# Patient Record
Sex: FEMALE | Race: WHITE | NOT HISPANIC OR LATINO | Employment: OTHER | URBAN - METROPOLITAN AREA
[De-identification: names, ages, dates, MRNs, and addresses within clinical notes are randomized per-mention and may not be internally consistent; named-entity substitution may affect disease eponyms.]

---

## 2024-09-10 NOTE — H&P (VIEW-ONLY)
[facility]      Preoperative Consultation   Michelle Isaac   : 1955   Gender: female    Date of Encounter: 9/10/2024    Nursing Notes:   Leticia Iraheta MA  9/10/2024 10:57 AM  Signed  Chief Complaint   Patient presents with     Preoperative Exam     2024 - [facility]      Michelle Isaac is a 69 y.o. female (1955) who presents for preop evaluation undergoing OPEN LEFT HIP GLUTEUS MEDIUS TENDON REPAIR WITH ILIOTIBIAL BAND LENGTHENING     Date of Surgery: 2024  Surgical Specialty: Orthopedic  Kaleb Issa MD - [facility]  Hospital/Surgical Facility: [facility]  Fax number: [phone]   Surgery type: outpatient  Primary Physician: [place] CMA ....................  9/10/2024   10:52 AM       History of Present Illness   Patient is 69 years old very pleasant lady with chronic bilateral hip pain/weakness and MRI has revealed left sided gluteus medius tendon tear and right-sided gluteus medius and minus partial tear.  Patient is scheduled for left hip gluteus medius tendon repair with iliotibial band lengthening.  Patient otherwise in her usual state of health and doing well.  No cold or flulike symptoms.  No fever chills or night sweats.  No headache dizziness or vertigo.  No cough or wheezing or palpitations or shortness of breath or chest pain at rest or exertion.  No heartburn or reflux.  No bowel or bladder symptoms.       Review of Systems   A comprehensive review of systems was negative except for items noted in HPI.    Patient Active Problem List   Diagnosis Code     Symptomatic menopausal or female climacteric states N95.1     Unspecified hypothyroidism E03.9     HEALTH MAINTENANCE Z00.00     Acquired hypothyroidism E03.9     Hyperglycemia R73.9     Preop general physical exam Z01.818     Current Outpatient Medications   Medication Sig     CALCIUM 500 MG TAB      celecoxib (CELEBREX) 100 mg capsule  Take 100-200 mg by mouth once daily with a meal.     cholecalciferol (VITAMIN D-3) 2,000 unit capsule Take 1 capsule by mouth once daily.     cyanocobalamin (VITAMIN B12) 500 mcg tablet Take 1,000 mcg by mouth once daily.     ginkgo biloba 40 mg tab Take 40 mg by mouth once daily.     glucosamine sulfate 500 mg tab Take 500 mg by mouth once daily.     levothyroxine (SYNTHROID) 75 mcg tablet Take 1 Tablet (75 mcg) by mouth once daily.     MULTIVITAMIN TAB take 1 tablet by oral route once daily with food     omega-3 fatty acids-vitamin E (FISH OIL) 1,000 mg cap Take  by mouth.     triamcinolone (ARISTOCORT; KENALOG) 0.1 % cream Apply  topically to affected area(s) 2 times daily PRN itch.     TURMERIC ORAL Take 2 capsules by mouth once daily.     vitamin e 200 unit capsule Take 200 units by mouth once daily.     No current facility-administered medications for this visit.     Medications have been reviewed by me and are current to the best of my knowledge and ability.     Allergies   Allergen Reactions     Cipro [Ciprofloxacin]      abdominal pain     Past Surgical History:   . Laterality Date     (IA) HCHG INTRACARDIAC ABLATION FOR TX OF SVT  01/01/2003     (IA) [place] CATARACT SURGERYTRK POST OP MGT      Dr. Fan     COLONOSCOPY SCREENING  2005, 2010    normal,hemorrhoids, rpt q5y.     CYST REMOVAL  17 years ago    Under Left breast     TVT  20 years ago     VEIN STRIPPING  10+ years ago    Legs     Social History     Tobacco Use     Smoking status: Never     Smokeless tobacco: Never   Vaping Use     Vaping status: Never Used   Substance Use Topics     Alcohol use: Yes     Comment: Occas.      Drug use: No     Family History   Problem Relation Age of Onset     Diabetes Father         Type 2     Heart Disease Father 65        bypass     Stroke Father      Other Brother         CAM'S VASCULITIS     Heart Disease Brother      Heart Disease Brother      Thyroid Disease Daughter         Hypothyroidism         PAST  DIFFICULTY WITH ANESTHESIA: None     Physical Exam   /70 (Cuff Site: Right Arm, Position: Sitting, Cuff Size: Adult Regular)   Pulse 78   Wt 76.2 kg (168 lb)   SpO2 98%   BMI 27.96 kg/m   Body mass index is 27.96 kg/m .    Gen: patient is awake and alert and looks comfortable.  HEENT: no conjunctiva pallor, no scleral icterus, pupils are equal round and reactive to light. Oral cavity clean, tongue moist.  Neck: supple, trachea central, no thyromegaly, no lymphadenopathy.  Chest: air entry is normal bilaterally, no wheezes and crackles.  Heart: S1 and S2 are audible and regular.   Abdomen: soft , no guarding or rigidity, no masses or hepatosplenomegaly and bowel sounds are present.  Extremities : pulses are present, no ankle/leg edema.  Skin: no rash.  CNS/Psych: patient is awake, alert and oriented x 3. Cooperative and polite. Speech and memory are intact. Mood and affect are normal. Gait is antalgic no focal deficit.       Assessment / Plan     Labs: pending  ECG: Normal sinus rhythm.  Rate 73.  No acute ST or T wave changes.  No old EKG available for comparison.    ICD-10-CM    1. Preop general physical exam  Z01.818 CBC AND DIFFERENTIAL     BASIC METABOLIC PANEL     PROTIME-INR     DE READING EKG - NO CHARGE, COMP ONLY     EKG 12 LEAD     DE ECG ROUTINE ECG W/LEAST 12 LDS W/I&R      2. Bilateral hip pain  M25.551 CBC AND DIFFERENTIAL    M25.552 BASIC METABOLIC PANEL     PROTIME-INR     DE READING EKG - NO CHARGE, COMP ONLY     EKG 12 LEAD     DE ECG ROUTINE ECG W/LEAST 12 LDS W/I&R          Patient is cleared for planned procedure.     Please stop taking  Aspirin, Advil/Ibuprofen, Aleeve/Naproxen, indomethacin or any other NSAIDS or Vázquez 2 Inhibitors( Diclofenac, Meloxicam, Celecoxib, etc.) 7 days prior to procedure/surgery.    Stop all the vitamin products and Herbal supplements/drugs 7 days and Fish oil capsule 14 prior to procedure/surgery.    Stop taking any muscle relaxants, Baclofen, Flexeril or  Zanaflex, 7 days prior to procedure/surgery.    Ok to takeTylenol ( acetaminophen) as needed for pain and fever.      You should take  your all other routine medications with sip of water on the morning of procedure/surgery.    Please call us if any questions or concerns.     Electronically Signed by:   Stephan Lane MD ....................  9/10/2024   11:07 AM    9/10/2024

## 2024-09-11 RX ORDER — LEVOTHYROXINE SODIUM 75 UG/1
75 TABLET ORAL DAILY
COMMUNITY
Start: 2024-01-06

## 2024-09-11 RX ORDER — VITAMIN E (DL,TOCOPHERYL ACET) 90 MG
200 CAPSULE ORAL DAILY
COMMUNITY

## 2024-09-11 RX ORDER — ATORVASTATIN CALCIUM 20 MG/1
1 TABLET, FILM COATED ORAL AT BEDTIME
COMMUNITY
Start: 2024-09-10

## 2024-09-13 ENCOUNTER — ANESTHESIA (OUTPATIENT)
Dept: SURGERY | Facility: CLINIC | Age: 69
End: 2024-09-13
Payer: MEDICARE

## 2024-09-13 ENCOUNTER — HOSPITAL ENCOUNTER (OUTPATIENT)
Facility: CLINIC | Age: 69
Discharge: HOME | End: 2024-09-13
Attending: ORTHOPAEDIC SURGERY | Admitting: ORTHOPAEDIC SURGERY
Payer: MEDICARE

## 2024-09-13 ENCOUNTER — ANESTHESIA EVENT (OUTPATIENT)
Dept: SURGERY | Facility: CLINIC | Age: 69
End: 2024-09-13
Payer: MEDICARE

## 2024-09-13 VITALS
DIASTOLIC BLOOD PRESSURE: 68 MMHG | HEIGHT: 65 IN | SYSTOLIC BLOOD PRESSURE: 129 MMHG | HEART RATE: 73 BPM | BODY MASS INDEX: 27.66 KG/M2 | RESPIRATION RATE: 16 BRPM | OXYGEN SATURATION: 95 % | TEMPERATURE: 98.9 F | WEIGHT: 166 LBS

## 2024-09-13 DIAGNOSIS — S76.012D TEAR OF LEFT GLUTEUS MEDIUS TENDON, SUBSEQUENT ENCOUNTER: Primary | ICD-10-CM

## 2024-09-13 PROCEDURE — 370N000017 HC ANESTHESIA TECHNICAL FEE, PER MIN: Performed by: ORTHOPAEDIC SURGERY

## 2024-09-13 PROCEDURE — 250N000009 HC RX 250: Performed by: NURSE ANESTHETIST, CERTIFIED REGISTERED

## 2024-09-13 PROCEDURE — C1713 ANCHOR/SCREW BN/BN,TIS/BN: HCPCS | Performed by: ORTHOPAEDIC SURGERY

## 2024-09-13 PROCEDURE — 250N000009 HC RX 250: Performed by: ORTHOPAEDIC SURGERY

## 2024-09-13 PROCEDURE — 250N000011 HC RX IP 250 OP 636

## 2024-09-13 PROCEDURE — 999N000141 HC STATISTIC PRE-PROCEDURE NURSING ASSESSMENT: Performed by: ORTHOPAEDIC SURGERY

## 2024-09-13 PROCEDURE — 250N000011 HC RX IP 250 OP 636: Performed by: ANESTHESIOLOGY

## 2024-09-13 PROCEDURE — 258N000003 HC RX IP 258 OP 636: Performed by: ANESTHESIOLOGY

## 2024-09-13 PROCEDURE — 258N000003 HC RX IP 258 OP 636: Performed by: NURSE ANESTHETIST, CERTIFIED REGISTERED

## 2024-09-13 PROCEDURE — 360N000076 HC SURGERY LEVEL 3, PER MIN: Performed by: ORTHOPAEDIC SURGERY

## 2024-09-13 PROCEDURE — 250N000011 HC RX IP 250 OP 636: Performed by: ORTHOPAEDIC SURGERY

## 2024-09-13 PROCEDURE — 250N000011 HC RX IP 250 OP 636: Performed by: NURSE ANESTHETIST, CERTIFIED REGISTERED

## 2024-09-13 PROCEDURE — 272N000001 HC OR GENERAL SUPPLY STERILE: Performed by: ORTHOPAEDIC SURGERY

## 2024-09-13 PROCEDURE — 710N000010 HC RECOVERY PHASE 1, LEVEL 2, PER MIN: Performed by: ORTHOPAEDIC SURGERY

## 2024-09-13 PROCEDURE — 250N000013 HC RX MED GY IP 250 OP 250 PS 637: Performed by: ANESTHESIOLOGY

## 2024-09-13 PROCEDURE — 710N000012 HC RECOVERY PHASE 2, PER MINUTE: Performed by: ORTHOPAEDIC SURGERY

## 2024-09-13 DEVICE — SUTURETAPE, 1.3MM W/NEEDLE, WHITE/BLUE
Type: IMPLANTABLE DEVICE | Site: HIP | Status: FUNCTIONAL
Brand: ARTHREX®

## 2024-09-13 DEVICE — BIO-COMP SWVLK C, CLD 4.75X19.1MM
Type: IMPLANTABLE DEVICE | Site: HIP | Status: FUNCTIONAL
Brand: ARTHREX®

## 2024-09-13 DEVICE — SWIVELOCK, SP PEEK 4.75MM
Type: IMPLANTABLE DEVICE | Site: HIP | Status: FUNCTIONAL
Brand: ARTHREX®

## 2024-09-13 RX ORDER — ACETAMINOPHEN 325 MG/1
975 TABLET ORAL ONCE
Status: COMPLETED | OUTPATIENT
Start: 2024-09-13 | End: 2024-09-13

## 2024-09-13 RX ORDER — ONDANSETRON 4 MG/1
4 TABLET, ORALLY DISINTEGRATING ORAL EVERY 8 HOURS PRN
Qty: 4 TABLET | Refills: 0 | Status: SHIPPED | OUTPATIENT
Start: 2024-09-13

## 2024-09-13 RX ORDER — HYDROCODONE BITARTRATE AND ACETAMINOPHEN 5; 325 MG/1; MG/1
1-2 TABLET ORAL EVERY 4 HOURS PRN
Qty: 40 TABLET | Refills: 0 | Status: SHIPPED | OUTPATIENT
Start: 2024-09-13

## 2024-09-13 RX ORDER — BUPIVACAINE HYDROCHLORIDE 2.5 MG/ML
INJECTION, SOLUTION INFILTRATION; PERINEURAL
Status: DISCONTINUED
Start: 2024-09-13 | End: 2024-09-13 | Stop reason: HOSPADM

## 2024-09-13 RX ORDER — OXYCODONE HYDROCHLORIDE 5 MG/1
5 TABLET ORAL
Status: COMPLETED | OUTPATIENT
Start: 2024-09-13 | End: 2024-09-13

## 2024-09-13 RX ORDER — ASCORBIC ACID 1000 MG
40 TABLET ORAL DAILY
COMMUNITY

## 2024-09-13 RX ORDER — CEFAZOLIN SODIUM/WATER 2 G/20 ML
2 SYRINGE (ML) INTRAVENOUS SEE ADMIN INSTRUCTIONS
Status: DISCONTINUED | OUTPATIENT
Start: 2024-09-13 | End: 2024-09-13 | Stop reason: HOSPADM

## 2024-09-13 RX ORDER — ONDANSETRON 2 MG/ML
4 INJECTION INTRAMUSCULAR; INTRAVENOUS EVERY 30 MIN PRN
Status: DISCONTINUED | OUTPATIENT
Start: 2024-09-13 | End: 2024-09-13 | Stop reason: HOSPADM

## 2024-09-13 RX ORDER — MAGNESIUM HYDROXIDE 1200 MG/15ML
LIQUID ORAL PRN
Status: DISCONTINUED | OUTPATIENT
Start: 2024-09-13 | End: 2024-09-13 | Stop reason: HOSPADM

## 2024-09-13 RX ORDER — ONDANSETRON 4 MG/1
4 TABLET, ORALLY DISINTEGRATING ORAL EVERY 30 MIN PRN
Status: DISCONTINUED | OUTPATIENT
Start: 2024-09-13 | End: 2024-09-13 | Stop reason: HOSPADM

## 2024-09-13 RX ORDER — FENTANYL CITRATE 50 UG/ML
25 INJECTION, SOLUTION INTRAMUSCULAR; INTRAVENOUS EVERY 5 MIN PRN
Status: DISCONTINUED | OUTPATIENT
Start: 2024-09-13 | End: 2024-09-13 | Stop reason: HOSPADM

## 2024-09-13 RX ORDER — HYDROMORPHONE HCL IN WATER/PF 6 MG/30 ML
0.4 PATIENT CONTROLLED ANALGESIA SYRINGE INTRAVENOUS EVERY 5 MIN PRN
Status: DISCONTINUED | OUTPATIENT
Start: 2024-09-13 | End: 2024-09-13 | Stop reason: HOSPADM

## 2024-09-13 RX ORDER — ONDANSETRON 2 MG/ML
INJECTION INTRAMUSCULAR; INTRAVENOUS PRN
Status: DISCONTINUED | OUTPATIENT
Start: 2024-09-13 | End: 2024-09-13

## 2024-09-13 RX ORDER — ACETAMINOPHEN 325 MG/1
650 TABLET ORAL
Status: DISCONTINUED | OUTPATIENT
Start: 2024-09-13 | End: 2024-09-13 | Stop reason: HOSPADM

## 2024-09-13 RX ORDER — SODIUM CHLORIDE, SODIUM LACTATE, POTASSIUM CHLORIDE, CALCIUM CHLORIDE 600; 310; 30; 20 MG/100ML; MG/100ML; MG/100ML; MG/100ML
INJECTION, SOLUTION INTRAVENOUS CONTINUOUS
Status: DISCONTINUED | OUTPATIENT
Start: 2024-09-13 | End: 2024-09-13 | Stop reason: HOSPADM

## 2024-09-13 RX ORDER — CELECOXIB 200 MG/1
200 CAPSULE ORAL 2 TIMES DAILY
Status: ON HOLD | COMMUNITY
End: 2024-09-13

## 2024-09-13 RX ORDER — METHYLPREDNISOLONE 4 MG
500 TABLET, DOSE PACK ORAL DAILY
COMMUNITY

## 2024-09-13 RX ORDER — NALOXONE HYDROCHLORIDE 0.4 MG/ML
0.1 INJECTION, SOLUTION INTRAMUSCULAR; INTRAVENOUS; SUBCUTANEOUS
Status: DISCONTINUED | OUTPATIENT
Start: 2024-09-13 | End: 2024-09-13 | Stop reason: HOSPADM

## 2024-09-13 RX ORDER — OXYCODONE HYDROCHLORIDE 5 MG/1
10 TABLET ORAL
Status: COMPLETED | OUTPATIENT
Start: 2024-09-13 | End: 2024-09-13

## 2024-09-13 RX ORDER — PROPOFOL 10 MG/ML
INJECTION, EMULSION INTRAVENOUS PRN
Status: DISCONTINUED | OUTPATIENT
Start: 2024-09-13 | End: 2024-09-13

## 2024-09-13 RX ORDER — HYDROMORPHONE HCL IN WATER/PF 6 MG/30 ML
0.2 PATIENT CONTROLLED ANALGESIA SYRINGE INTRAVENOUS EVERY 5 MIN PRN
Status: DISCONTINUED | OUTPATIENT
Start: 2024-09-13 | End: 2024-09-13 | Stop reason: HOSPADM

## 2024-09-13 RX ORDER — CHOLECALCIFEROL (VITAMIN D3) 50 MCG
1 TABLET ORAL DAILY
COMMUNITY

## 2024-09-13 RX ORDER — DEXAMETHASONE SODIUM PHOSPHATE 10 MG/ML
4 INJECTION, SOLUTION INTRAMUSCULAR; INTRAVENOUS
Status: DISCONTINUED | OUTPATIENT
Start: 2024-09-13 | End: 2024-09-13 | Stop reason: HOSPADM

## 2024-09-13 RX ORDER — LIDOCAINE HYDROCHLORIDE 10 MG/ML
INJECTION, SOLUTION INFILTRATION; PERINEURAL PRN
Status: DISCONTINUED | OUTPATIENT
Start: 2024-09-13 | End: 2024-09-13

## 2024-09-13 RX ORDER — MAGNESIUM SULFATE 4 G/50ML
4 INJECTION INTRAVENOUS ONCE
Status: COMPLETED | OUTPATIENT
Start: 2024-09-13 | End: 2024-09-13

## 2024-09-13 RX ORDER — CHLORAL HYDRATE 500 MG
2 CAPSULE ORAL DAILY
COMMUNITY

## 2024-09-13 RX ORDER — HYDROCODONE BITARTRATE AND ACETAMINOPHEN 5; 325 MG/1; MG/1
2 TABLET ORAL
Status: DISCONTINUED | OUTPATIENT
Start: 2024-09-13 | End: 2024-09-13 | Stop reason: HOSPADM

## 2024-09-13 RX ORDER — BUPIVACAINE HYDROCHLORIDE 2.5 MG/ML
INJECTION, SOLUTION INFILTRATION; PERINEURAL PRN
Status: DISCONTINUED | OUTPATIENT
Start: 2024-09-13 | End: 2024-09-13 | Stop reason: HOSPADM

## 2024-09-13 RX ORDER — FENTANYL CITRATE 50 UG/ML
INJECTION, SOLUTION INTRAMUSCULAR; INTRAVENOUS PRN
Status: DISCONTINUED | OUTPATIENT
Start: 2024-09-13 | End: 2024-09-13

## 2024-09-13 RX ORDER — FENTANYL CITRATE 50 UG/ML
50 INJECTION, SOLUTION INTRAMUSCULAR; INTRAVENOUS EVERY 5 MIN PRN
Status: DISCONTINUED | OUTPATIENT
Start: 2024-09-13 | End: 2024-09-13 | Stop reason: HOSPADM

## 2024-09-13 RX ORDER — DEXAMETHASONE SODIUM PHOSPHATE 4 MG/ML
4 INJECTION, SOLUTION INTRA-ARTICULAR; INTRALESIONAL; INTRAMUSCULAR; INTRAVENOUS; SOFT TISSUE
Status: DISCONTINUED | OUTPATIENT
Start: 2024-09-13 | End: 2024-09-13 | Stop reason: HOSPADM

## 2024-09-13 RX ORDER — DEXAMETHASONE SODIUM PHOSPHATE 4 MG/ML
INJECTION, SOLUTION INTRA-ARTICULAR; INTRALESIONAL; INTRAMUSCULAR; INTRAVENOUS; SOFT TISSUE PRN
Status: DISCONTINUED | OUTPATIENT
Start: 2024-09-13 | End: 2024-09-13

## 2024-09-13 RX ORDER — PROPOFOL 10 MG/ML
INJECTION, EMULSION INTRAVENOUS CONTINUOUS PRN
Status: DISCONTINUED | OUTPATIENT
Start: 2024-09-13 | End: 2024-09-13

## 2024-09-13 RX ORDER — ACETAMINOPHEN 325 MG/1
650 TABLET ORAL EVERY 4 HOURS PRN
Qty: 50 TABLET | Refills: 0 | Status: SHIPPED | OUTPATIENT
Start: 2024-09-13

## 2024-09-13 RX ORDER — CEFAZOLIN SODIUM/WATER 2 G/20 ML
2 SYRINGE (ML) INTRAVENOUS
Status: COMPLETED | OUTPATIENT
Start: 2024-09-13 | End: 2024-09-13

## 2024-09-13 RX ORDER — ASPIRIN 81 MG/1
81 TABLET ORAL 2 TIMES DAILY
Qty: 60 TABLET | Refills: 0 | Status: SHIPPED | OUTPATIENT
Start: 2024-09-13

## 2024-09-13 RX ORDER — MULTIPLE VITAMINS W/ MINERALS TAB 9MG-400MCG
1 TAB ORAL DAILY
COMMUNITY

## 2024-09-13 RX ORDER — LIDOCAINE 40 MG/G
CREAM TOPICAL
Status: DISCONTINUED | OUTPATIENT
Start: 2024-09-13 | End: 2024-09-13 | Stop reason: HOSPADM

## 2024-09-13 RX ORDER — CALCIUM CARBONATE 500(1250)
1 TABLET ORAL DAILY
COMMUNITY

## 2024-09-13 RX ADMIN — LIDOCAINE HYDROCHLORIDE 20 MG: 10 INJECTION, SOLUTION INFILTRATION; PERINEURAL at 10:07

## 2024-09-13 RX ADMIN — OXYCODONE HYDROCHLORIDE 5 MG: 5 TABLET ORAL at 14:01

## 2024-09-13 RX ADMIN — ACETAMINOPHEN 975 MG: 325 TABLET ORAL at 08:19

## 2024-09-13 RX ADMIN — HYDROMORPHONE HYDROCHLORIDE 0.4 MG: 0.2 INJECTION, SOLUTION INTRAMUSCULAR; INTRAVENOUS; SUBCUTANEOUS at 12:37

## 2024-09-13 RX ADMIN — PHENYLEPHRINE HYDROCHLORIDE 25 MCG: 10 INJECTION INTRAVENOUS at 10:21

## 2024-09-13 RX ADMIN — PROPOFOL 180 MG: 10 INJECTION, EMULSION INTRAVENOUS at 10:07

## 2024-09-13 RX ADMIN — HYDROMORPHONE HYDROCHLORIDE 1 MG: 1 INJECTION, SOLUTION INTRAMUSCULAR; INTRAVENOUS; SUBCUTANEOUS at 11:50

## 2024-09-13 RX ADMIN — DEXAMETHASONE SODIUM PHOSPHATE 4 MG: 4 INJECTION, SOLUTION INTRA-ARTICULAR; INTRALESIONAL; INTRAMUSCULAR; INTRAVENOUS; SOFT TISSUE at 10:07

## 2024-09-13 RX ADMIN — OXYCODONE HYDROCHLORIDE 5 MG: 5 TABLET ORAL at 14:56

## 2024-09-13 RX ADMIN — PROPOFOL 150 MCG/KG/MIN: 10 INJECTION, EMULSION INTRAVENOUS at 10:10

## 2024-09-13 RX ADMIN — SODIUM CHLORIDE, POTASSIUM CHLORIDE, SODIUM LACTATE AND CALCIUM CHLORIDE: 600; 310; 30; 20 INJECTION, SOLUTION INTRAVENOUS at 08:21

## 2024-09-13 RX ADMIN — ONDANSETRON 4 MG: 2 INJECTION INTRAMUSCULAR; INTRAVENOUS at 11:07

## 2024-09-13 RX ADMIN — SUGAMMADEX 200 MG: 100 INJECTION, SOLUTION INTRAVENOUS at 11:33

## 2024-09-13 RX ADMIN — HYDROMORPHONE HYDROCHLORIDE 0.4 MG: 0.2 INJECTION, SOLUTION INTRAMUSCULAR; INTRAVENOUS; SUBCUTANEOUS at 12:29

## 2024-09-13 RX ADMIN — FENTANYL CITRATE 100 MCG: 50 INJECTION INTRAMUSCULAR; INTRAVENOUS at 10:07

## 2024-09-13 RX ADMIN — PHENYLEPHRINE HYDROCHLORIDE 25 MCG: 10 INJECTION INTRAVENOUS at 10:24

## 2024-09-13 RX ADMIN — FENTANYL CITRATE 25 MCG: 50 INJECTION, SOLUTION INTRAMUSCULAR; INTRAVENOUS at 12:04

## 2024-09-13 RX ADMIN — MIDAZOLAM 2 MG: 1 INJECTION INTRAMUSCULAR; INTRAVENOUS at 10:00

## 2024-09-13 RX ADMIN — ROCURONIUM BROMIDE 50 MG: 10 INJECTION, SOLUTION INTRAVENOUS at 10:07

## 2024-09-13 RX ADMIN — FENTANYL CITRATE 25 MCG: 50 INJECTION, SOLUTION INTRAMUSCULAR; INTRAVENOUS at 12:09

## 2024-09-13 RX ADMIN — Medication 2 G: at 10:01

## 2024-09-13 RX ADMIN — PHENYLEPHRINE HYDROCHLORIDE 25 MCG: 10 INJECTION INTRAVENOUS at 10:27

## 2024-09-13 RX ADMIN — HYDROMORPHONE HYDROCHLORIDE 0.2 MG: 0.2 INJECTION, SOLUTION INTRAMUSCULAR; INTRAVENOUS; SUBCUTANEOUS at 12:24

## 2024-09-13 RX ADMIN — MAGNESIUM SULFATE HEPTAHYDRATE 4 G: 4 INJECTION, SOLUTION INTRAVENOUS at 08:21

## 2024-09-13 RX ADMIN — PHENYLEPHRINE HYDROCHLORIDE 25 MCG: 10 INJECTION INTRAVENOUS at 10:30

## 2024-09-13 RX ADMIN — SODIUM CHLORIDE, POTASSIUM CHLORIDE, SODIUM LACTATE AND CALCIUM CHLORIDE: 600; 310; 30; 20 INJECTION, SOLUTION INTRAVENOUS at 11:34

## 2024-09-13 ASSESSMENT — ACTIVITIES OF DAILY LIVING (ADL)
ADLS_ACUITY_SCORE: 18
ADLS_ACUITY_SCORE: 18
ADLS_ACUITY_SCORE: 16
ADLS_ACUITY_SCORE: 18

## 2024-09-13 NOTE — PHARMACY-ADMISSION MEDICATION HISTORY
Pharmacist Admission Medication History    Admission medication history is complete. The information provided in this note is only as accurate as the sources available at the time of the update.    Information Source(s): Patient and CareEverywhere/SureScripts via in-person.  No dispense report at time of interview    Pertinent Information:   Allergies reviewed with patient and updates made in EHR: no    Medication History Completed By: Puja Isaac RPH 9/13/2024 8:55 AM    PTA Med List   Medication Sig Last Dose    calcium carbonate (OS-ZACHARIAH) 500 MG tablet Take 1 tablet by mouth daily. 9/10/2024    celecoxib (CELEBREX) 200 MG capsule Take 200 mg by mouth 2 times daily. 9/10/2024    fish oil-omega-3 fatty acids 1000 MG capsule Take 2 g by mouth daily. 9/10/2024    Ginkgo Biloba (GINKOBA) 40 MG TABS Take 40 mg by mouth daily. 9/10/2024    Glucosamine Sulfate 500 MG TABS Take 500 mg by mouth daily. 9/10/2024    levothyroxine (SYNTHROID/LEVOTHROID) 75 MCG tablet Take 75 mcg by mouth daily. 9/13/2024 at AM    multivitamin w/minerals (THERA-VIT-M) tablet Take 1 tablet by mouth daily. 9/10/2024    TURMERIC PO Take 2 tablets by mouth daily. 9/10/2024    vitamin B-12 (CYANOCOBALAMIN) 1000 MCG tablet Take 1,000 mcg by mouth daily. 9/10/2024    vitamin D3 (CHOLECALCIFEROL) 50 mcg (2000 units) tablet Take 1 tablet by mouth daily. 9/10/2024    Vitamin E 90 MG (200 UNIT) capsule Take 200 Units by mouth daily. 9/10/2024

## 2024-09-13 NOTE — ANESTHESIA POSTPROCEDURE EVALUATION
Patient: Michelle Isaac    Procedure: Procedure(s):  OPEN LEFT HIP GLUTEUS MEDIUS TENDON REPAIR WITH ILIOTIBIAL BAND LENGTHENING  TROCHANTERIC BURSECTOMY       Anesthesia Type:  General    Note:  Disposition: Inpatient   Postop Pain Control: Uneventful            Sign Out: Well controlled pain   PONV: No   Neuro/Psych: Uneventful            Sign Out: Acceptable/Baseline neuro status   Airway/Respiratory: Uneventful            Sign Out: Acceptable/Baseline resp. status   CV/Hemodynamics: Uneventful            Sign Out: Acceptable CV status; No obvious hypovolemia; No obvious fluid overload   Other NRE: NONE   DID A NON-ROUTINE EVENT OCCUR? No           Last vitals:  Vitals Value Taken Time   /73 09/13/24 1300   Temp 37.2  C (98.9  F) 09/13/24 1300   Pulse 76 09/13/24 1307   Resp 17 09/13/24 1307   SpO2 99 % 09/13/24 1307   Vitals shown include unfiled device data.    Electronically Signed By: Prem Guadarrama MD  September 13, 2024  2:31 PM

## 2024-09-13 NOTE — OP NOTE
DATE OF SURGERY: September 13, 2024    PRE-OPERATIVE DIAGNOSIS: Left hip gluteus medius and minimus tendon tear    POST-OPERATIVE DIAGNOSIS: As above with trochanteric bursitis    PROCEDURE:  1.  Left hip open gluteus medius and minimus tendon repair with double row technique  2.  Left hip open iliotibial band lengthening with trochanteric bursectomy    ATTENDING: Kaleb Issa MD    ASSISTANT: Rell Guadarrama PA-C.  A physicians assistant was required for patient positioning and retraction and this could not have been performed by surgical technologist alone.    ANESTHESIA: General    ESTIMATED BLOOD LOSS: [place]    IMPLANTS:  Arthrex 4.75mm PEEK SwiveLock x 3  Arthrex 4.75mm Bio-composite SwiveLock x 2    COMPLICATIONS: None    SPECIMENS: None    INDICATIONS FOR PROCEDURE: Michelle is a 69-year-old female with bilateral hip pain, left greater than right.  Examination was consistent with a Trendelenburg gait, lateral pain, weakness of her gluteus medius and imaging consistent with full-thickness tearing.  She is exhausted nonsurgical treatment therefore we discussed to proceed with operative repair.  Risk, benefits, alternatives reviewed.  Portance of postoperative crutch and brace use, physical therapy and use of aspirin to minimize risk of blood clot was reviewed.  A shared decision was made to proceed.  Informed consent was obtained.    Patient was identified in the preoperative holding area.  The correct operative site, left hip, was marked with indelible ink.  Patient was brought to the operating room and placed supine on the table.  Infusion of IV antibiotics was confirmed and general endotracheal anesthesia was induced without complications.  She was then placed into the lateral decubitus position with the left side up and all prominences were well-padded including the right peroneal nerve and an axillary roll was placed.  The left lower extremity was then prepped and draped in the usual sterile  fashion.    Prior to the procedure, a time out was performed to identify the correct patient, operative site, and any implants needed.    Longitudinal incision was made centered over the greater trochanter.  Sharp dissection was carried down through skin and subcutaneous tissue.  Extensile incision was made proximally and distally through the iliotibial band and perpendicular cuts were made anteriorly and posteriorly to effectively lengthen the iliotibial band.  There was a significant amount of bursal fluid and dense bursitis and scar tissue within the bursal space which was sharply resected with the electrocautery.  This revealed a full-thickness gluteus minimus tear and a high-grade partial-thickness gluteus medius tear with a full-thickness component anteriorly.  The gluteus medius tear was then completed and the footprint was easily visualized, it was roughened with a rongeur and a rasp.    There was excellent mobility of the gluteus minimus, anterior portion of the gluteus medius and some adhesions holding the posterior aspect of the tendon back which were released bluntly.  I then placed three  holes in the proximal portion of the tuberosity, anteriorly, centrally and posteriorly, respectively.  Into each was placed an Arthrex 4.75 mm peek swivel lock anchor double loaded with tape.  All tapes were then passed in a mattress fashion with a free needle just distal to the musculotendinous junction.  Respective suture pairs were brought distally into additional Vance 4.75 mm bio composite swivel lock anchors that were twisted in position.  The #2 FiberWire suture from the anterior anchor was then placed in a mattress fashion through a small distal flap of the gluteus minimus tendon that was tied with alternating half hitch knots and secured it nicely down to bone.  Arthroscopic knots were also tied medially to secure the medial row and there was excellent apposition of the tendon back to the footprint and  complete covering of the gluteus medius and minimus insertions.    The wound was copiously irrigated and closed in layers using observable suture.  Sterile dressing was placed, local anesthetic was instilled around the incision site.  She was then flipped supine, hip brace was placed.  She was extubated, awakened and taken to the PACU in stable condition.  All sponge and needle counts were correct as present for all key portions of the procedure.  There were no apparent complications.    POST-OPERATIVE PLAN:    1.  Toe-touch weightbearing left lower extremity for 6 weeks.  Brace at all times, follow the gluteus medius tendon repair protocol.  2.  Discharge home today with [place] for pain control, aspirin daily for DVT prophylaxis, early mobilization  3.  Return in 2 weeks for clinical check.

## 2024-09-13 NOTE — ANESTHESIA PREPROCEDURE EVALUATION
"Anesthesia Pre-Procedure Evaluation    Patient: Michelle Isaac   MRN: [id number] : 1955        Procedure : Procedure(s):  OPEN LEFT HIP GLUTEUS MEDIUS TENDON REPAIR WITH ILIOTIBIAL BAND LENGTHENING  TROCHANTERIC BURSECTOMY          Past Medical History:   Diagnosis Date    Thyroid disease       History reviewed. No pertinent surgical history.   Allergies   Allergen Reactions    Ciprofloxacin Hives and Rash     \"Many years ago\"      Social History     Tobacco Use    Smoking status: Never    Smokeless tobacco: Never   Substance Use Topics    Alcohol use: Not Currently      Wt Readings from Last 1 Encounters:   24 75.3 kg (166 lb)        Anesthesia Evaluation            ROS/MED HX  ENT/Pulmonary:  - neg pulmonary ROS     Neurologic:  - neg neurologic ROS     Cardiovascular:  - neg cardiovascular ROS     METS/Exercise Tolerance:     Hematologic:  - neg hematologic  ROS     Musculoskeletal:  - neg musculoskeletal ROS     GI/Hepatic:  - neg GI/hepatic ROS     Renal/Genitourinary:  - neg Renal ROS     Endo:     (+)          thyroid problem,            Psychiatric/Substance Use:  - neg psychiatric ROS     Infectious Disease:  - neg infectious disease ROS     Malignancy:  - neg malignancy ROS     Other:  - neg other ROS          Physical Exam    Airway  airway exam normal      Mallampati: II   TM distance: > 3 FB   Neck ROM: full   Mouth opening: > 3 cm    Respiratory Devices and Support         Dental  no notable dental history         Cardiovascular   cardiovascular exam normal       Rhythm and rate: regular and normal     Pulmonary   pulmonary exam normal        breath sounds clear to auscultation           OUTSIDE LABS:  CBC: No results found for: \"WBC\", \"HGB\", \"HCT\", \"PLT\"  BMP: No results found for: \"NA\", \"POTASSIUM\", \"CHLORIDE\", \"CO2\", \"BUN\", \"CR\", \"GLC\"  COAGS: No results found for: \"PTT\", \"INR\", \"FIBR\"  POC: No results found for: \"BGM\", \"HCG\", \"HCGS\"  HEPATIC: No results found for: \"ALBUMIN\", " "\"PROTTOTAL\", \"ALT\", \"AST\", \"GGT\", \"ALKPHOS\", \"BILITOTAL\", \"BILIDIRECT\", \"CHRIS\"  OTHER: No results found for: \"PH\", \"LACT\", \"A1C\", \"ZACHARIAH\", \"PHOS\", \"MAG\", \"LIPASE\", \"AMYLASE\", \"TSH\", \"T4\", \"T3\", \"CRP\", \"SED\"    Anesthesia Plan    ASA Status:  2    NPO Status:  NPO Appropriate    Anesthesia Type: General.     - Airway: ETT   Induction: RSI.   Maintenance: Balanced.        Consents    Anesthesia Plan(s) and associated risks, benefits, and realistic alternatives discussed. Questions answered and patient/representative(s) expressed understanding.     - Discussed:     - Discussed with:  Patient      - Extended Intubation/Ventilatory Support Discussed: No.      - Patient is DNR/DNI Status: No     Use of blood products discussed: No .     Postoperative Care    Pain management: IV analgesics, Oral pain medications.   PONV prophylaxis: Ondansetron (or other 5HT-3), Dexamethasone or Solumedrol, Background Propofol Infusion     Comments:               Prem Guadarrama MD    I have reviewed the pertinent notes and labs in the chart from the past 30 days and (re)examined the patient.  Any updates or changes from those notes are reflected in this note.              # Overweight: Estimated body mass index is 27.62 kg/m  as calculated from the following:    Height as of this encounter: 1.651 m (5' 5\").    Weight as of this encounter: 75.3 kg (166 lb).      "

## 2024-09-13 NOTE — ANESTHESIA PROCEDURE NOTES
Airway       Patient location during procedure: OR       Procedure Start/Stop Times: 9/13/2024 10:09 AM  Staff -        CRNA: America Roberson APRN CRNA       Performed By: CRNAIndications and Patient Condition       Indications for airway management: derik-procedural       Induction type:intravenous       Mask difficulty assessment: 1 - vent by mask    Final Airway Details       Final airway type: endotracheal airway       Successful airway: ETT - single  Endotracheal Airway Details        ETT size (mm): 7.0       Cuffed: yes       Cuff volume (mL): 6       Successful intubation technique: direct laryngoscopy       DL Blade Type: Kasper 2       Grade View of Cords: 1       Adjucts: stylet       Position: Left       Measured from: gums/teeth       Secured at (cm): 21       Bite block used: Soft    Post intubation assessment        Placement verified by: capnometry, equal breath sounds and chest rise        Number of attempts at approach: 1       Number of other approaches attempted: 0       Secured with: tape       Ease of procedure: easy       Dentition: Intact and Unchanged       Dental guard used and removed. Dental Guard Type: Standard White.    Medication(s) Administered   Medication Administration Time: 9/13/2024 10:09 AM        
Patient's first and last name, , procedure, and correct site confirmed prior to the start of procedure.

## 2024-09-13 NOTE — BRIEF OP NOTE
[facility]    Brief Operative Note    Pre-operative diagnosis: Tear of gluteus medius tendon [S76.019A]  Post-operative diagnosis Same as pre-operative diagnosis    Procedure: OPEN LEFT HIP GLUTEUS MEDIUS TENDON REPAIR WITH ILIOTIBIAL BAND LENGTHENING, Left - Knee  TROCHANTERIC BURSECTOMY, Left - Hip    Surgeon: Surgeons and Role:     * Kaleb Issa MD - Primary     * Otf Guadarrama PA-C - Assisting     * Jossy Adams PA-C - Assisting  Anesthesia: General   Estimated Blood Loss: Less than 50 ml    Drains: None  Specimens: * No specimens in log *  Findings:   None.  Complications: None.  Implants:   Implant Name Type Inv. Item Serial No.  Lot No. LRB No. Used Action   PEEK SwiveLock [place] Self Punching    ARTHREX [id number] Left 3 Implanted   SUTURETAPE 1.3MM W/TAILS WHITE/BLUE AR-7500 - [id number] Metallic Hardware/[place] SUTURETAPE 1.3MM W/TAILS WHITE/BLUE AR-7500  ARTHREX [id number] Left 3 Implanted   PEEK SwiveLock [place] Self Punching     [id number] Left 1 Wasted   IMP ANCHOR ARTHREX BIO-SWIVELOCK 4.75X19.1MM [id number] - [id number] Metallic Hardware/[place] IMP ANCHOR ARTHREX BIO-SWIVELOCK 4.75X19.1MM [id number]  ARTHREX [id number] Left 2 Implanted

## 2024-09-13 NOTE — INTERVAL H&P NOTE
"I have reviewed the surgical (or preoperative) H&P that is linked to this encounter, and examined the patient. There are no significant changes    Clinical Conditions Present on Arrival:  Clinically Significant Risk Factors Present on Admission                      # Overweight: Estimated body mass index is 27.62 kg/m  as calculated from the following:    Height as of this encounter: 1.651 m (5' 5\").    Weight as of this encounter: 75.3 kg (166 lb).       "

## 2024-09-13 NOTE — ANESTHESIA CARE TRANSFER NOTE
Patient: Michelle Isaac    Procedure: Procedure(s):  OPEN LEFT HIP GLUTEUS MEDIUS TENDON REPAIR WITH ILIOTIBIAL BAND LENGTHENING  TROCHANTERIC BURSECTOMY       Diagnosis: Tear of gluteus medius tendon [S76.019A]  Diagnosis Additional Information: No value filed.    Anesthesia Type:   General     Note:    Oropharynx: oropharynx clear of all foreign objects  Level of Consciousness: awake  Oxygen Supplementation: room air and nasal cannula  Level of Supplemental Oxygen (L/min / FiO2): 3  Independent Airway: airway patency satisfactory and stable  Dentition: dentition unchanged  Vital Signs Stable: post-procedure vital signs reviewed and stable  Report to RN Given: handoff report given  Patient transferred to: PACU    Handoff Report: Identifed the Patient, Identified the Reponsible Provider, Reviewed the pertinent medical history, Discussed the surgical course, Reviewed Intra-OP anesthesia mangement and issues during anesthesia, Set expectations for post-procedure period and Allowed opportunity for questions and acknowledgement of understanding      Vitals:  Vitals Value Taken Time   /70 09/13/24 1151   Temp 98.9f 09/13/24 1152   Pulse 77 09/13/24 1152   Resp 26 09/13/24 1152   SpO2 100 % 09/13/24 1152   Vitals shown include unfiled device data.    Electronically Signed By: JAVIER Cuevas CRNA  September 13, 2024  11:54 AM

## 2025-03-19 ENCOUNTER — TRANSFERRED RECORDS (OUTPATIENT)
Dept: HEALTH INFORMATION MANAGEMENT | Facility: CLINIC | Age: 70
End: 2025-03-19
Payer: MEDICARE

## 2025-04-06 ENCOUNTER — HEALTH MAINTENANCE LETTER (OUTPATIENT)
Age: 70
End: 2025-04-06

## 2025-04-14 NOTE — H&P (VIEW-ONLY)
[facility]      Preoperative Consultation   Michelle Isaac   : 1955   Gender: female    Date of Encounter: 2025    Nursing Notes:   Isidra Gauthier LPN  2025 10:38 AM  Signed  Chief Complaint   Patient presents with     Preoperative Exam     Open right hip gluteus medius tendon repair     Foot Problem     2nd digit right      Pain     Right sided flank pain     Insomnia     Michelle Isaac is a 70 y.o. female (1955) who presents for preop evaluation undergoing open right hip gluteus medius tendon repair.    Date of Surgery: 25  Surgical Specialty: Orthopedic  Kaleb Issa MD - [facility]  Hospital/Surgical Facility:  [facility]  Fax number:   Surgery type: outpatient  Primary Physician: Los Marques    Patient is aware of her need for a tdap immunization.    Isidra Gauthier LPN ........................2025 10:38 AM    Health Maintenance Due   Topic Date Due     Tetanus booster  2023     COVID-19 vaccine series ( season) 2024     BMI (ht and wt on same day) for age 18+  2025     Health maintenance reviewed with patient Yes      Patient presents for an in-person office visit: alone  Communication Method: Patient is active on Register My InfoÂ® and has been instructed that results/communications will be made via Register My InfoÂ®  If a phone call is needed, the preferred number is: Mobile   Home Phone [phone]   Mobile [phone]     May we leave a detailed message at this number? Yes       History of Present Illness   Michelle Isaac is a 70 y.o. female with histofy o hypothyroidism who presents today for preoperative exam for open right hip gluteus medius tendon repair.  Pt underwent left glute medius and minimus tendon repair, ITB lengthening with trochanteric bursectomy on 24.  She has had increased pain with the right glute. She has slight antalgic gait.  MRI done 24 showed high-grade partial thickness  "tearing of the gluteus medius tendon on the right.  .  She hasdone PT and has been taking celebrex 200mg and tylenol for pain.      Has pain along \"inside\" right side that was worth with coughing, sneezing, or moving wrong.  Has some muscle spasms across lower back. Stretching helps.  Thinks the gluteal medius on right is triggering the low back spasms.      Slow urine flow.  Had sling placed years ago. NO dysuria    Has skin issue on the right side of neck that she has been scratching.      Takes melatonin but only sleeps 3 hours at time. Reads from her tablet or plays [employer]. Delayed release melatonin does not as well as the melatonin 5mg \"the kind that you suck\".      History of Present Illness               Review of Systems   A comprehensive review of systems was negative except for items noted in HPI.    Patient Active Problem List   Diagnosis Code     Symptomatic menopausal or female climacteric states N95.1     Unspecified hypothyroidism E03.9     HEALTH MAINTENANCE Z00.00     Acquired hypothyroidism E03.9     Hyperglycemia R73.9     Preop general physical exam Z01.818     Current Outpatient Medications   Medication Sig     acetaminophen SR (Tylenol Arthritis Pain) 650 mg Extended-Release tablet Take 1,300 mg by mouth every 8 hours if needed. Max acetaminophen dose: 4000mg in 24 hrs.     atorvastatin 20 mg tablet Take 1 Tablet (20 mg) by mouth at bedtime.     CALCIUM 500 MG TAB      celecoxib (CELEBREX) 100 mg capsule Take 100-200 mg by mouth once daily with a meal. (Patient taking differently: Take 100-200 mg by mouth 2 times daily if needed.)     cholecalciferol (VITAMIN D-3) 2,000 unit capsule Take 1 capsule by mouth once daily.     cyanocobalamin (VITAMIN B12) 500 mcg tablet Take 1,000 mcg by mouth once daily.     ginkgo biloba 40 mg tab Take 40 mg by mouth once daily.     glucosamine sulfate 500 mg tab Take 500 mg by mouth once daily.     levothyroxine 75 mcg tablet Take 1 Tablet (75 mcg) by mouth " "once daily.     MULTIVITAMIN TAB take 1 tablet by oral route once daily with food     omega-3 fatty acids-vitamin E (FISH OIL) 1,000 mg cap Take  by mouth.     triamcinolone (ARISTOCORT; KENALOG) 0.1 % cream Apply  topically to affected area(s) 2 times daily PRN itch.     TURMERIC ORAL Take 2 capsules by mouth once daily.     vitamin e 200 unit capsule Take 200 units by mouth once daily.     No current facility-administered medications for this visit.     Medications have been reviewed by me and are current to the best of my knowledge and ability.     Allergies   Allergen Reactions     Cipro [Ciprofloxacin]      abdominal pain     Past Surgical History:   . Laterality Date     (IA) [facility]CHG INTRACARDIAC ABLATION FOR TX OF SVT  01/01/2003     (IA) [facility] CATARACT SURGERYTRK POST OP MGT      Dr. Fan     COLONOSCOPY SCREENING  2005, 2010    normal,hemorrhoids, rpt q5y.     CYST REMOVAL  17 years ago    Under Left breast     hip tendon repair  09/2024     TVT  20 years ago     VEIN STRIPPING  10+ years ago    Legs     Social History     Tobacco Use     Smoking status: Never     Smokeless tobacco: Never   Vaping Use     Vaping status: Never Used   Substance Use Topics     Alcohol use: Yes     Comment: Occas.      Drug use: No     Family History   Problem Relation Age of Onset     Diabetes Father         Type 2     Heart Disease Father 65        bypass     Stroke Father      Other Brother         CAM'S VASCULITIS     Heart Disease Brother      Heart Disease Brother      Thyroid Disease Daughter         Hypothyroidism     Results              PAST DIFFICULTY WITH ANESTHESIA: No.  NO personal or family history of malignant hyperthermia     Physical Exam   /84 (Cuff Site: Right Arm, Position: Sitting, Cuff Size: Adult Regular)   Pulse 62   Temp (!) 96.4  F (35.8  C) (Tympanic)   Ht 1.645 m (5' 4.75\")   Wt 75.9 kg (167 lb 6.4 oz)   BMI 28.07 kg/m   Body mass index is 28.07 kg/m .  Physical Exam            General " Appearance: Pleasant, alert, appropriate appearance for age. No acute distress  Head Exam: Normal. Normocephalic, atraumatic.  Eye Exam: Normal external eye, conjunctiva, lids, cornea. JUAN.  Ear Exam: Normal.  Nose Exam: Normal external nose, mucous membranes, and septum.  OroPharynx Exam: Dental hygiene adequate. Normal buccal mucosa. Normal pharynx.  Neck Exam: Normal., Supple, no masses or nodes.  Thyroid Exam: No nodules or enlargement.  Chest/Respiratory Exam: Normal chest wall and respirations. Clear to auscultation.  Cardiovascular Exam: Regular rate and rhythm. S1, S2, no murmur, click, gallop, or rubs.  Gastrointestinal Exam: Soft, nontender, no abnormal masses or organomegaly.  Musculoskeletal Exam: Back is straight and non-tender, full ROM of upper and lower extremities.  Foot Exam: Normal.  Rigth foot with wart on plantar surface of second digit  Skin: no rash or abnormalities  Neurologic Exam: Nonfocal; symmetric DTRs, normal gross motor movement, tone, and coordination. No tremor.     Assessment / Plan     The Pre-Op Tool    Recommendations      Low Risk Procedure    Cardiac History  No history of coronary artery disease           Labs  No routine labs indicated  EKG  Not indicated  Stress Testing  Not indicated    * Testing recommendations are intended to assist, but not direct, clinical decisions.           Continue taking Celecoxib (Celebrex) through procedure (it does not 'thin' the blood).  Take your other medications as usual prior to the procedure  Hold vitamins and/or supplements for 1 week prior to the procedure  Hold fish oil for 2 weeks prior to the procedure  Okay to take Acetaminophen (Tylenol) up until the procedure    * Medication recommendations are not intended to be exhaustive; they are limited to common medications that are potentially dangerous if incorrectly managed          Labs  * Data supports elimination of  routine  laboratory testing in favor of focused,  indicated  testing  "based on medical co-morbidities. A 2009 study randomized 1061 patients undergoing ambulatory, non-cataract surgery to routine or to indicated testing. Perioperative adverse events were similar (Anesthesia & Analgesia 2009;108:467-75; Anesthesiol. Clin. 2016 Mar;34(1):43-58).  EKG  * The ACC/AHA recommends against obtaining routine EKGs in patients undergoing low risk surgeries, a class IIa recommendation (JACC. 2014;64(21);e1-76).     Session ID: [id number]  Endnotes and bibliography available upon request: [email or web address]      Labs: no  ECG: no      ICD-10-CM    1. Preop examination  Z01.818       2. Hypothyroidism, unspecified type  E03.9 levothyroxine 75 mcg tablet      3. Hyperlipidemia, unspecified hyperlipidemia type  E78.5 atorvastatin 20 mg tablet      4. Right flank discomfort  R10.9 STAT Urinalysis w/ reflex to Microscopic     URINE CULTURE      5. Plantar wart of right foot  B07.0 MT DESTRUCTION BENIGN LESIONS UP TO 14      6. Benign skin lesion  L98.9       Cryotherapy done on neck and two small lesions on plantar surface.        Assessment & Plan            Patient is low risk for planned procedure.     Following review of past medical/surgical history & ROS, and physical exam, Michelle is determined to be at a reasonably low risk of surgical complications. Per [facility]'s online pre-operative tool, further pre-procedure evaluation is not recommended.  Given all the available information, Michelle is \"cleared\" for the planned procedure. We discussed that surgical \"clearance\" does not equate with a guarantee of an uncomplicated &/or successful surgery. Specific pre-operative medication instructions discussed with Michelle and provided in writing via AVS. All questions answered. Return precautions discussed. Recommended PCP follow-up as needed, and as instructed by the surgeon. Michelle verbalized understanding and agreement with recommendations.     Patient " Instructions     Patient Medication Instructions           Continue taking Celecoxib (Celebrex) through procedure (it does not 'thin' the blood).  Take your other medications as usual prior to the procedure  Hold vitamins and/or supplements for 1 week prior to the procedure  Hold fish oil for 2 weeks prior to the procedure  Okay to take Acetaminophen (Tylenol) up until the procedure    * Medication recommendations are not intended to be exhaustive; they are limited to common medications that are potentially dangerous if incorrectly managed       MyChart Messages     [employer] messages are intended for quick communication in follow-up to a previously discussed problem and do not replace an office visit.  If it has been more than 2 weeks since you have been seen, a visit will usually be required.  If your message requests management of a new problem our nurses will direct you to schedule.  New medications will not be prescribed by our office without an office visit, virtual visit, or eVisit.  If you have a new problem that you think may require same day attention and are unsure where to access care recommend calling the clinic and discussing with our triage nurses       Lab or Imaging Results    If lab or imaging tests were ordered, you will receive the results via your [employer]) account if you have one. Results are automatically released to your [employer] [facility]) account once available. This means that you may see your results before I have had a chance to review them. After the results become available, comments from our team are typically posted to your [employer]) account within 2-5 business days.  I usually wait until all or nearly all of the lab results have returned and make a onetime comment rather than comment on each lab result individually.  Please do not send a [facility] message requesting my thoughts on labs or imaging that you have reviewed if I have not yet left comments for  you.    If you do not have an [employer] account, results typically arrive by mail within 1-2 weeks.      Medication Refills    If you need refills please contact your pharmacist. They will send a refill request for us to review. Please allow 3-5 business days for us to process all refill requests.      My Work Hours    When I am in clinic, I typically work Monday, Tuesday, and Thursdays with appointments scheduled between 7:45am-3pm.   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  I do not have clinic hours during the ~9 weeks a year that I am caring for patients at the hospital.        Electronically Signed by:   Los Marques MD   4/14/2025

## 2025-04-17 RX ORDER — CELECOXIB 100 MG/1
100 CAPSULE ORAL 2 TIMES DAILY
Status: ON HOLD | COMMUNITY
End: 2025-04-22

## 2025-04-22 ENCOUNTER — ANESTHESIA EVENT (OUTPATIENT)
Dept: SURGERY | Facility: CLINIC | Age: 70
End: 2025-04-22
Payer: MEDICARE

## 2025-04-22 ENCOUNTER — HOSPITAL ENCOUNTER (OUTPATIENT)
Facility: CLINIC | Age: 70
Discharge: HOME | End: 2025-04-22
Attending: ORTHOPAEDIC SURGERY | Admitting: ORTHOPAEDIC SURGERY
Payer: MEDICARE

## 2025-04-22 ENCOUNTER — ANESTHESIA (OUTPATIENT)
Dept: SURGERY | Facility: CLINIC | Age: 70
End: 2025-04-22
Payer: MEDICARE

## 2025-04-22 VITALS
DIASTOLIC BLOOD PRESSURE: 70 MMHG | BODY MASS INDEX: 27.82 KG/M2 | RESPIRATION RATE: 18 BRPM | HEIGHT: 65 IN | HEART RATE: 76 BPM | OXYGEN SATURATION: 98 % | SYSTOLIC BLOOD PRESSURE: 131 MMHG | WEIGHT: 167 LBS | TEMPERATURE: 97.7 F

## 2025-04-22 DIAGNOSIS — Z47.89 ORTHOPEDIC AFTERCARE: Primary | ICD-10-CM

## 2025-04-22 PROCEDURE — 258N000003 HC RX IP 258 OP 636: Performed by: STUDENT IN AN ORGANIZED HEALTH CARE EDUCATION/TRAINING PROGRAM

## 2025-04-22 PROCEDURE — 250N000009 HC RX 250: Performed by: NURSE ANESTHETIST, CERTIFIED REGISTERED

## 2025-04-22 PROCEDURE — 250N000013 HC RX MED GY IP 250 OP 250 PS 637: Performed by: STUDENT IN AN ORGANIZED HEALTH CARE EDUCATION/TRAINING PROGRAM

## 2025-04-22 PROCEDURE — 710N000012 HC RECOVERY PHASE 2, PER MINUTE: Performed by: ORTHOPAEDIC SURGERY

## 2025-04-22 PROCEDURE — 272N000002 HC OR SUPPLY OTHER OPNP: Performed by: ORTHOPAEDIC SURGERY

## 2025-04-22 PROCEDURE — C1763 CONN TISS, NON-HUMAN: HCPCS | Performed by: ORTHOPAEDIC SURGERY

## 2025-04-22 PROCEDURE — 250N000011 HC RX IP 250 OP 636

## 2025-04-22 PROCEDURE — 370N000017 HC ANESTHESIA TECHNICAL FEE, PER MIN: Performed by: ORTHOPAEDIC SURGERY

## 2025-04-22 PROCEDURE — 250N000011 HC RX IP 250 OP 636: Performed by: NURSE ANESTHETIST, CERTIFIED REGISTERED

## 2025-04-22 PROCEDURE — 360N000076 HC SURGERY LEVEL 3, PER MIN: Performed by: ORTHOPAEDIC SURGERY

## 2025-04-22 PROCEDURE — C1713 ANCHOR/SCREW BN/BN,TIS/BN: HCPCS | Performed by: ORTHOPAEDIC SURGERY

## 2025-04-22 PROCEDURE — 250N000013 HC RX MED GY IP 250 OP 250 PS 637: Performed by: ORTHOPAEDIC SURGERY

## 2025-04-22 PROCEDURE — 250N000009 HC RX 250: Performed by: STUDENT IN AN ORGANIZED HEALTH CARE EDUCATION/TRAINING PROGRAM

## 2025-04-22 PROCEDURE — 250N000011 HC RX IP 250 OP 636: Performed by: ORTHOPAEDIC SURGERY

## 2025-04-22 PROCEDURE — 250N000025 HC SEVOFLURANE, PER MIN: Performed by: ORTHOPAEDIC SURGERY

## 2025-04-22 PROCEDURE — 272N000001 HC OR GENERAL SUPPLY STERILE: Performed by: ORTHOPAEDIC SURGERY

## 2025-04-22 PROCEDURE — 258N000003 HC RX IP 258 OP 636: Performed by: NURSE ANESTHETIST, CERTIFIED REGISTERED

## 2025-04-22 PROCEDURE — 710N000010 HC RECOVERY PHASE 1, LEVEL 2, PER MIN: Performed by: ORTHOPAEDIC SURGERY

## 2025-04-22 PROCEDURE — 250N000011 HC RX IP 250 OP 636: Mod: JZ | Performed by: NURSE ANESTHETIST, CERTIFIED REGISTERED

## 2025-04-22 PROCEDURE — 999N000141 HC STATISTIC PRE-PROCEDURE NURSING ASSESSMENT: Performed by: ORTHOPAEDIC SURGERY

## 2025-04-22 DEVICE — IMPLANTABLE DEVICE
Type: IMPLANTABLE DEVICE | Site: HIP | Status: FUNCTIONAL
Brand: ALPHAVENT

## 2025-04-22 RX ORDER — SENNOSIDES 8.6 MG
1300 CAPSULE ORAL EVERY 8 HOURS PRN
Status: ON HOLD | COMMUNITY
End: 2025-04-22

## 2025-04-22 RX ORDER — LIDOCAINE 40 MG/G
CREAM TOPICAL
Status: DISCONTINUED | OUTPATIENT
Start: 2025-04-22 | End: 2025-04-22 | Stop reason: HOSPADM

## 2025-04-22 RX ORDER — NALOXONE HYDROCHLORIDE 0.4 MG/ML
0.1 INJECTION, SOLUTION INTRAMUSCULAR; INTRAVENOUS; SUBCUTANEOUS
Status: DISCONTINUED | OUTPATIENT
Start: 2025-04-22 | End: 2025-04-22 | Stop reason: HOSPADM

## 2025-04-22 RX ORDER — SODIUM CHLORIDE, SODIUM LACTATE, POTASSIUM CHLORIDE, CALCIUM CHLORIDE 600; 310; 30; 20 MG/100ML; MG/100ML; MG/100ML; MG/100ML
INJECTION, SOLUTION INTRAVENOUS CONTINUOUS
Status: DISCONTINUED | OUTPATIENT
Start: 2025-04-22 | End: 2025-04-22 | Stop reason: HOSPADM

## 2025-04-22 RX ORDER — EPHEDRINE SULFATE 50 MG/ML
INJECTION, SOLUTION INTRAMUSCULAR; INTRAVENOUS; SUBCUTANEOUS PRN
Status: DISCONTINUED | OUTPATIENT
Start: 2025-04-22 | End: 2025-04-22

## 2025-04-22 RX ORDER — HYDROCODONE BITARTRATE AND ACETAMINOPHEN 5; 325 MG/1; MG/1
1-2 TABLET ORAL EVERY 4 HOURS PRN
Qty: 30 TABLET | Refills: 0 | Status: ON HOLD | OUTPATIENT
Start: 2025-04-22

## 2025-04-22 RX ORDER — ONDANSETRON 4 MG/1
4 TABLET, ORALLY DISINTEGRATING ORAL EVERY 8 HOURS PRN
Qty: 4 TABLET | Refills: 0 | Status: ON HOLD | OUTPATIENT
Start: 2025-04-22

## 2025-04-22 RX ORDER — FENTANYL CITRATE 50 UG/ML
25 INJECTION, SOLUTION INTRAMUSCULAR; INTRAVENOUS
Status: DISCONTINUED | OUTPATIENT
Start: 2025-04-22 | End: 2025-04-22 | Stop reason: HOSPADM

## 2025-04-22 RX ORDER — ONDANSETRON 4 MG/1
4 TABLET, ORALLY DISINTEGRATING ORAL EVERY 30 MIN PRN
Status: DISCONTINUED | OUTPATIENT
Start: 2025-04-22 | End: 2025-04-22 | Stop reason: HOSPADM

## 2025-04-22 RX ORDER — CEFAZOLIN SODIUM/WATER 2 G/20 ML
2 SYRINGE (ML) INTRAVENOUS SEE ADMIN INSTRUCTIONS
Status: DISCONTINUED | OUTPATIENT
Start: 2025-04-22 | End: 2025-04-22 | Stop reason: HOSPADM

## 2025-04-22 RX ORDER — ASPIRIN 81 MG/1
81 TABLET ORAL 2 TIMES DAILY
Qty: 60 TABLET | Refills: 0 | Status: ON HOLD | OUTPATIENT
Start: 2025-04-22

## 2025-04-22 RX ORDER — HYDROMORPHONE HCL IN WATER/PF 6 MG/30 ML
0.2 PATIENT CONTROLLED ANALGESIA SYRINGE INTRAVENOUS EVERY 5 MIN PRN
Status: DISCONTINUED | OUTPATIENT
Start: 2025-04-22 | End: 2025-04-22 | Stop reason: HOSPADM

## 2025-04-22 RX ORDER — FENTANYL CITRATE 50 UG/ML
INJECTION, SOLUTION INTRAMUSCULAR; INTRAVENOUS PRN
Status: DISCONTINUED | OUTPATIENT
Start: 2025-04-22 | End: 2025-04-22

## 2025-04-22 RX ORDER — ONDANSETRON 2 MG/ML
INJECTION INTRAMUSCULAR; INTRAVENOUS PRN
Status: DISCONTINUED | OUTPATIENT
Start: 2025-04-22 | End: 2025-04-22

## 2025-04-22 RX ORDER — CEFAZOLIN SODIUM/WATER 2 G/20 ML
2 SYRINGE (ML) INTRAVENOUS
Status: COMPLETED | OUTPATIENT
Start: 2025-04-22 | End: 2025-04-22

## 2025-04-22 RX ORDER — DEXAMETHASONE SODIUM PHOSPHATE 4 MG/ML
4 INJECTION, SOLUTION INTRA-ARTICULAR; INTRALESIONAL; INTRAMUSCULAR; INTRAVENOUS; SOFT TISSUE
Status: DISCONTINUED | OUTPATIENT
Start: 2025-04-22 | End: 2025-04-22 | Stop reason: HOSPADM

## 2025-04-22 RX ORDER — LABETALOL HYDROCHLORIDE 5 MG/ML
10 INJECTION, SOLUTION INTRAVENOUS
Status: DISCONTINUED | OUTPATIENT
Start: 2025-04-22 | End: 2025-04-22 | Stop reason: HOSPADM

## 2025-04-22 RX ORDER — ONDANSETRON 4 MG/1
4 TABLET, ORALLY DISINTEGRATING ORAL
Status: DISCONTINUED | OUTPATIENT
Start: 2025-04-22 | End: 2025-04-22 | Stop reason: HOSPADM

## 2025-04-22 RX ORDER — ACETAMINOPHEN 325 MG/1
650 TABLET ORAL
Status: DISCONTINUED | OUTPATIENT
Start: 2025-04-22 | End: 2025-04-22 | Stop reason: HOSPADM

## 2025-04-22 RX ORDER — HYDROMORPHONE HCL IN WATER/PF 6 MG/30 ML
0.4 PATIENT CONTROLLED ANALGESIA SYRINGE INTRAVENOUS EVERY 5 MIN PRN
Status: DISCONTINUED | OUTPATIENT
Start: 2025-04-22 | End: 2025-04-22 | Stop reason: HOSPADM

## 2025-04-22 RX ORDER — BUPIVACAINE HYDROCHLORIDE 2.5 MG/ML
INJECTION, SOLUTION EPIDURAL; INFILTRATION; INTRACAUDAL; PERINEURAL
Status: DISCONTINUED
Start: 2025-04-22 | End: 2025-04-22 | Stop reason: HOSPADM

## 2025-04-22 RX ORDER — HYDRALAZINE HYDROCHLORIDE 20 MG/ML
2.5-5 INJECTION INTRAMUSCULAR; INTRAVENOUS EVERY 10 MIN PRN
Status: DISCONTINUED | OUTPATIENT
Start: 2025-04-22 | End: 2025-04-22 | Stop reason: HOSPADM

## 2025-04-22 RX ORDER — ACETAMINOPHEN 325 MG/1
650 TABLET ORAL EVERY 4 HOURS PRN
Qty: 50 TABLET | Refills: 0 | Status: SHIPPED | OUTPATIENT
Start: 2025-04-22 | End: 2025-04-27

## 2025-04-22 RX ORDER — OXYCODONE HYDROCHLORIDE 5 MG/1
5-10 TABLET ORAL EVERY 4 HOURS PRN
Qty: 26 TABLET | Refills: 0 | Status: CANCELLED | OUTPATIENT
Start: 2025-04-22

## 2025-04-22 RX ORDER — DEXAMETHASONE SODIUM PHOSPHATE 10 MG/ML
4 INJECTION, SOLUTION INTRAMUSCULAR; INTRAVENOUS
Status: DISCONTINUED | OUTPATIENT
Start: 2025-04-22 | End: 2025-04-22 | Stop reason: HOSPADM

## 2025-04-22 RX ORDER — BUPIVACAINE HYDROCHLORIDE 2.5 MG/ML
INJECTION, SOLUTION INFILTRATION; PERINEURAL PRN
Status: DISCONTINUED | OUTPATIENT
Start: 2025-04-22 | End: 2025-04-22 | Stop reason: HOSPADM

## 2025-04-22 RX ORDER — ONDANSETRON 2 MG/ML
4 INJECTION INTRAMUSCULAR; INTRAVENOUS EVERY 30 MIN PRN
Status: DISCONTINUED | OUTPATIENT
Start: 2025-04-22 | End: 2025-04-22 | Stop reason: HOSPADM

## 2025-04-22 RX ORDER — CELECOXIB 100 MG/1
100 CAPSULE ORAL 2 TIMES DAILY
Qty: 30 CAPSULE | Refills: 0 | Status: ON HOLD | OUTPATIENT
Start: 2025-04-22

## 2025-04-22 RX ORDER — DEXAMETHASONE SODIUM PHOSPHATE 10 MG/ML
INJECTION, SOLUTION INTRAMUSCULAR; INTRAVENOUS PRN
Status: DISCONTINUED | OUTPATIENT
Start: 2025-04-22 | End: 2025-04-22

## 2025-04-22 RX ORDER — METHOCARBAMOL 750 MG/1
750 TABLET, FILM COATED ORAL
Status: DISCONTINUED | OUTPATIENT
Start: 2025-04-22 | End: 2025-04-22 | Stop reason: HOSPADM

## 2025-04-22 RX ORDER — HALOPERIDOL 5 MG/ML
1 INJECTION INTRAMUSCULAR
Status: DISCONTINUED | OUTPATIENT
Start: 2025-04-22 | End: 2025-04-22 | Stop reason: HOSPADM

## 2025-04-22 RX ORDER — KETAMINE HYDROCHLORIDE 10 MG/ML
INJECTION INTRAMUSCULAR; INTRAVENOUS PRN
Status: DISCONTINUED | OUTPATIENT
Start: 2025-04-22 | End: 2025-04-22

## 2025-04-22 RX ORDER — FENTANYL CITRATE 50 UG/ML
25 INJECTION, SOLUTION INTRAMUSCULAR; INTRAVENOUS EVERY 5 MIN PRN
Status: DISCONTINUED | OUTPATIENT
Start: 2025-04-22 | End: 2025-04-22 | Stop reason: HOSPADM

## 2025-04-22 RX ORDER — HYDROCODONE BITARTRATE AND ACETAMINOPHEN 5; 325 MG/1; MG/1
1 TABLET ORAL EVERY 4 HOURS PRN
Status: DISCONTINUED | OUTPATIENT
Start: 2025-04-22 | End: 2025-04-22 | Stop reason: HOSPADM

## 2025-04-22 RX ORDER — ACETAMINOPHEN 325 MG/1
975 TABLET ORAL ONCE
Status: COMPLETED | OUTPATIENT
Start: 2025-04-22 | End: 2025-04-22

## 2025-04-22 RX ORDER — OXYCODONE HYDROCHLORIDE 5 MG/1
5 TABLET ORAL
Status: CANCELLED | OUTPATIENT
Start: 2025-04-22

## 2025-04-22 RX ORDER — FENTANYL CITRATE 50 UG/ML
50 INJECTION, SOLUTION INTRAMUSCULAR; INTRAVENOUS EVERY 5 MIN PRN
Status: DISCONTINUED | OUTPATIENT
Start: 2025-04-22 | End: 2025-04-22 | Stop reason: HOSPADM

## 2025-04-22 RX ORDER — PROPOFOL 10 MG/ML
INJECTION, EMULSION INTRAVENOUS PRN
Status: DISCONTINUED | OUTPATIENT
Start: 2025-04-22 | End: 2025-04-22

## 2025-04-22 RX ORDER — AMOXICILLIN 250 MG
1-2 CAPSULE ORAL 2 TIMES DAILY
Qty: 30 TABLET | Refills: 0 | Status: ON HOLD | OUTPATIENT
Start: 2025-04-22

## 2025-04-22 RX ORDER — HYDROCODONE BITARTRATE AND ACETAMINOPHEN 5; 325 MG/1; MG/1
1 TABLET ORAL
Status: DISCONTINUED | OUTPATIENT
Start: 2025-04-22 | End: 2025-04-22 | Stop reason: HOSPADM

## 2025-04-22 RX ADMIN — PHENYLEPHRINE HYDROCHLORIDE 100 MCG: 10 INJECTION INTRAVENOUS at 12:45

## 2025-04-22 RX ADMIN — HYDROMORPHONE HYDROCHLORIDE 0.25 MG: 1 INJECTION, SOLUTION INTRAMUSCULAR; INTRAVENOUS; SUBCUTANEOUS at 14:31

## 2025-04-22 RX ADMIN — PHENYLEPHRINE HYDROCHLORIDE 100 MCG: 10 INJECTION INTRAVENOUS at 13:17

## 2025-04-22 RX ADMIN — HYDROMORPHONE HYDROCHLORIDE 0.25 MG: 1 INJECTION, SOLUTION INTRAMUSCULAR; INTRAVENOUS; SUBCUTANEOUS at 14:16

## 2025-04-22 RX ADMIN — Medication 200 MG: at 14:02

## 2025-04-22 RX ADMIN — Medication 5 MG: at 13:31

## 2025-04-22 RX ADMIN — DEXAMETHASONE SODIUM PHOSPHATE 10 MG: 10 INJECTION, SOLUTION INTRAMUSCULAR; INTRAVENOUS at 12:33

## 2025-04-22 RX ADMIN — SODIUM CHLORIDE, SODIUM LACTATE, POTASSIUM CHLORIDE, AND CALCIUM CHLORIDE: .6; .31; .03; .02 INJECTION, SOLUTION INTRAVENOUS at 11:01

## 2025-04-22 RX ADMIN — PHENYLEPHRINE HYDROCHLORIDE 100 MCG: 10 INJECTION INTRAVENOUS at 12:48

## 2025-04-22 RX ADMIN — DEXMEDETOMIDINE HYDROCHLORIDE 8 MCG: 100 INJECTION, SOLUTION INTRAVENOUS at 13:01

## 2025-04-22 RX ADMIN — ACETAMINOPHEN 975 MG: 325 TABLET, FILM COATED ORAL at 10:51

## 2025-04-22 RX ADMIN — HYDROMORPHONE HYDROCHLORIDE 0.5 MG: 1 INJECTION, SOLUTION INTRAMUSCULAR; INTRAVENOUS; SUBCUTANEOUS at 13:02

## 2025-04-22 RX ADMIN — Medication 10 MG: at 13:16

## 2025-04-22 RX ADMIN — FENTANYL CITRATE 100 MCG: 50 INJECTION INTRAMUSCULAR; INTRAVENOUS at 12:31

## 2025-04-22 RX ADMIN — LIDOCAINE HYDROCHLORIDE 50 MG: 10 INJECTION, SOLUTION EPIDURAL; INFILTRATION; INTRACAUDAL; PERINEURAL at 12:31

## 2025-04-22 RX ADMIN — ROCURONIUM BROMIDE 50 MG: 10 INJECTION, SOLUTION INTRAVENOUS at 12:31

## 2025-04-22 RX ADMIN — PROPOFOL 50 MCG/KG/MIN: 10 INJECTION, EMULSION INTRAVENOUS at 12:56

## 2025-04-22 RX ADMIN — HYDROCODONE BITARTRATE AND ACETAMINOPHEN 1 TABLET: 5; 325 TABLET ORAL at 15:55

## 2025-04-22 RX ADMIN — ONDANSETRON 4 MG: 2 INJECTION INTRAMUSCULAR; INTRAVENOUS at 12:57

## 2025-04-22 RX ADMIN — Medication 10 MG: at 12:56

## 2025-04-22 RX ADMIN — SODIUM CHLORIDE, SODIUM LACTATE, POTASSIUM CHLORIDE, AND CALCIUM CHLORIDE: .6; .31; .03; .02 INJECTION, SOLUTION INTRAVENOUS at 13:04

## 2025-04-22 RX ADMIN — DEXMEDETOMIDINE HYDROCHLORIDE 8 MCG: 100 INJECTION, SOLUTION INTRAVENOUS at 12:35

## 2025-04-22 RX ADMIN — Medication 20 MG: at 12:33

## 2025-04-22 RX ADMIN — MIDAZOLAM 1 MG: 1 INJECTION INTRAMUSCULAR; INTRAVENOUS at 12:25

## 2025-04-22 RX ADMIN — PROPOFOL 120 MG: 10 INJECTION, EMULSION INTRAVENOUS at 12:31

## 2025-04-22 RX ADMIN — Medication 2 G: at 12:25

## 2025-04-22 RX ADMIN — Medication 10 MG: at 13:18

## 2025-04-22 RX ADMIN — PHENYLEPHRINE HYDROCHLORIDE 0.3 MCG/KG/MIN: 10 INJECTION INTRAVENOUS at 13:25

## 2025-04-22 ASSESSMENT — ACTIVITIES OF DAILY LIVING (ADL)
ADLS_ACUITY_SCORE: 37
ADLS_ACUITY_SCORE: 35
ADLS_ACUITY_SCORE: 38
ADLS_ACUITY_SCORE: 37

## 2025-04-22 ASSESSMENT — ENCOUNTER SYMPTOMS: ORTHOPNEA: 0

## 2025-04-22 NOTE — ANESTHESIA PROCEDURE NOTES
Airway       Patient location during procedure: OR       Procedure Start/Stop Times: 4/22/2025 12:47 PM  Staff -        CRNA: Rolan Friedman APRN CRNA       Performed By: CRNA  Consent for Airway        Urgency: elective  Indications and Patient Condition       Indications for airway management: derik-procedural       Induction type:intravenous       Mask difficulty assessment: 1 - vent by mask    Final Airway Details       Final airway type: endotracheal airway       Successful airway: ETT - single  Endotracheal Airway Details        ETT size (mm): 7.0       Cuffed: yes       Cuff volume (mL): 10       Successful intubation technique: direct laryngoscopy       DL Blade Type: Kasper 2       Grade View of Cords: 1       Adjucts: stylet       Position: Right       Measured from: lips       Secured at (cm): 21       Bite block used: None    Post intubation assessment        Placement verified by: capnometry, equal breath sounds and chest rise        Number of attempts at approach: 1       Number of other approaches attempted: 0       Secured with: tape       Ease of procedure: easy       Dentition: Unchanged       Dental guard used and removed. Dental Guard Type: Standard White.    Medication(s) Administered   Medication Administration Time: 4/22/2025 12:47 PM

## 2025-04-22 NOTE — ANESTHESIA POSTPROCEDURE EVALUATION
Patient: Michelle Isaac    Procedure: Procedure(s):  OPEN RIGHT HIP GLUTEUS MEDIUS TENDON REPAIR,  TROCHANTERIC BURSECTOMY       Anesthesia Type:  General    Note:  Disposition: Outpatient   Postop Pain Control: Uneventful            Sign Out: Well controlled pain   PONV: No   Neuro/Psych: Uneventful            Sign Out: Acceptable/Baseline neuro status   Airway/Respiratory: Uneventful            Sign Out: Acceptable/Baseline resp. status   CV/Hemodynamics: Uneventful            Sign Out: Acceptable CV status; No obvious hypovolemia; No obvious fluid overload   Other NRE: NONE   DID A NON-ROUTINE EVENT OCCUR? No           Last vitals:  Vitals Value Taken Time   /69 04/22/25 1500   Temp 36.5  C (97.7  F) 04/22/25 1500   Pulse 88 04/22/25 1503   Resp 20 04/22/25 1503   SpO2 95 % 04/22/25 1503   Vitals shown include unfiled device data.    Electronically Signed By: Alicia Kent MD  April 22, 2025  3:07 PM

## 2025-04-22 NOTE — PHARMACY-ADMISSION MEDICATION HISTORY
Pharmacist EPI Medication History    Admission medication history is complete. The information provided in this note is only as accurate as the sources available at the time of the update.    Medication reconciliation/reorder completed by provider prior to medication history? No    Information Source(s): Patient and Clinic records and Care Everywhere via in-person    Pertinent Information: N/A    Allergies reviewed with patient and updates made in EHR: yes    Medications available for use during hospital stay: None.      Medication History Completed By: Presley Treadwell [facility] 4/22/2025 11:03 AM    PTA Med List   Medication Sig Last Dose/Taking    acetaminophen (TYLENOL 8 HOUR) 650 MG CR tablet Take 1,300 mg by mouth every 8 hours as needed for mild pain or fever. Unknown    atorvastatin (LIPITOR) 20 MG tablet Take 1 tablet by mouth at bedtime. 4/21/2025 Bedtime    calcium carbonate (OS-ZACHARIAH) 500 MG tablet Take 1 tablet by mouth daily. 4/14/2025    fish oil-omega-3 fatty acids 1000 MG capsule Take 2 g by mouth daily. 4/14/2025    Ginkgo Biloba (GINKOBA) 40 MG TABS Take 40 mg by mouth daily. 4/14/2025    Glucosamine Sulfate 500 MG TABS Take 500 mg by mouth daily. 4/14/2025    levothyroxine (SYNTHROID/LEVOTHROID) 75 MCG tablet Take 75 mcg by mouth daily. 4/22/2025 Morning    multivitamin w/minerals (THERA-VIT-M) tablet Take 1 tablet by mouth daily. 4/14/2025    TURMERIC PO Take 2 tablets by mouth daily. 4/14/2025    vitamin B-12 (CYANOCOBALAMIN) 1000 MCG tablet Take 1,000 mcg by mouth daily. 4/14/2025    vitamin D3 (CHOLECALCIFEROL) 50 mcg (2000 units) tablet Take 1 tablet by mouth daily. 4/14/2025    Vitamin E 90 MG (200 UNIT) capsule Take 200 Units by mouth daily. 4/14/2025

## 2025-04-22 NOTE — OP NOTE
DATE OF SURGERY: April 22, 2025    PRE-OPERATIVE DIAGNOSIS: Right hip gluteus medius and minimus tendon tearing    POST-OPERATIVE DIAGNOSIS: As above    PROCEDURE:  1.  Right hip open gluteus medius and minimus tendon repair, double row technique  2.  Right hip open iliotibial band lengthening with trochanteric bursectomy  3.  Right hip open augmentation of gluteus medius tendon repair with Artelon patch (4x6cm)    ATTENDING: Kaleb Issa MD    ASSISTANT: Rell Guadarrama PA-C.  A skilled physician's assistant was required for patient positioning and retraction.  This could not have been performed by a surgical technologist alone.    ANESTHESIA: General    ESTIMATED BLOOD LOSS: 20 mL    COMPLICATIONS: None    SPECIMENS: None    IMPLANTS:  Leia 4.75mm PEEK AlphaVent x 2  Leia 4.75mm PEEK AlphaVent Knotless x 2  Leia Artelon patch, 4 x 6 cm    INDICATIONS FOR PROCEDURE: Michelle is an active 70-year-old female with bilateral hip pain and weakness, she previously underwent open left gluteus medius tendon repair and is doing well.  She has had continued pain and Trendelenburg gait on the right, MRI demonstrating a tear.  We discussed nonsurgical and surgical treatments and a shared decision was made to proceed with operative repair.  Risk and benefits, alternatives reviewed.  Portance of postoperative physical therapy, crutch and brace use and use of aspirin to minimize risk of blood clot was reviewed.  She would like to proceed.    The patient identified in the preoperative holding area.  The correct operative site, the right hip, was marked with indelible ink.  Infusion of IV antibiotics was confirmed.  Patient was brought to the operating room and placed supine on the table.  General endotracheal anesthesia was induced without complications.  She was then placed in the lateral decubitus position with the right side up, left side down.  She was held with the [place] hip sanford and all prominences were  well-padded including an axillary roll.  The right lower extremity was then prepped and draped in usual sterile fashion.    Prior to the procedure, a time out was performed to identify the correct patient, operative site, and any implants needed.    Extensile incision was made centered over the greater trochanter.  Sharp dissection was carried down through skin and subcutaneous tissue.  The iliotibial band was then split in line with its fibers and this was extended both proximally and distally.  Perpendicular cuts were made anteriorly and posteriorly to effectively lengthen the tendon.  A self-retaining retractor was then placed.  There was dense hypertrophic bursa which was resected with the electrocautery and removed.    There was a full-thickness tear involving the gluteus medius tendon partial-thickness tearing involving the gluteus minimus.  This was completed to full-thickness tearing and the footprint was abraded with a Vyas elevator and a rasp.  I then made  holes proximally-medially and posterior-medially into the greater trochanter and into each was placed a Baldwin 4.75 mm peek alpha vent anchor triple loaded with the sutures.  All sutures were then passed using the Cobra suture passer in mattress configuration just distal to the musculotendinous junction.  I made sure to incorporate all of the layers.  Respective suture pairs were then brought laterally into additional Leia 4.75 mm peek alpha vent knotless anchors that were twisted into position with excellent purchase.  The sutures were cut short but the #2 Force Fiber sutures through the eyelet were left intact.  A single pair of sutures from both the anterior and posterior anchor were then tied to augment the medial row and suture was cut short.    I then brought a Leia Artelon patch onto the field measuring 6 x 4 cm.  This was secured with the extra set of sutures passed through the more proximal portion of the tendon and the additional  sutures left in the eyelet of the lateral row.  Sutures were placed with a free needle in a mattress fashion and moderate tension was applied to the graft and it was sewed in place to cover the entire surgical repair site.  There was a small excess amount of patch distally which was trimmed with the scissors.  I then methodically went around the periphery of the graft and utilized interrupted #2 Force Fiber sutures to secure the graft in place.  There was good fit.  The wound was then copiously irrigated, self-retaining retractor was removed.    Wound was closed in layers using absorbable suture.  Sterile dressing placed.  She was then flipped supine and a hip brace was placed.  She was extubated, awakened and taken to the PACU in stable condition.  All sponge needle counts were correct.  I was present for all key portions of the procedure.  There were no apparent complications.    POST-OPERATIVE PLAN:    1.  Toe-touch weightbearing right lower extremity for 6 weeks.  Follow gluteus medius repair protocol  2.  Discharge home from hospital today, narcotic prescription printed  3.  Begin physical therapy within the next week  4.  Return to clinic in 2 weeks as scheduled for wound check.

## 2025-04-22 NOTE — BRIEF OP NOTE
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  Complications: None.  Implants:   Implant Name Type Inv. Item Serial No.  Lot No. LRB No. Used Action   AlphaVent suture anchor 4.75 PEEK Suture [place] with 3 strands 1.4mm Other   NICHOLAS [id number] Right 2 Implanted   Artelon FlexPatch Dynamic matrix 4cm x 6cm   [id number]  [id number] Right 1 Implanted   AlphaVent Knotless SP 4.75mm PEEK anchor Other   NICHOLAS [id number] Right 1 Implanted   AlphaVent Knotless SP 4.75mm PEEK [place] Other   NICHOLAS [id number] Right 1 Implanted

## 2025-04-22 NOTE — ANESTHESIA PREPROCEDURE EVALUATION
"Anesthesia Pre-Procedure Evaluation    Patient: Michelle Isaac   MRN: [id number] : 1955        Procedure : Procedure(s):  OPEN RIGHT HIP GLUTEUS MEDIUS TENDON REPAIR,  TROCHANTERIC BURSECTOMY          Past Medical History:   Diagnosis Date    Thyroid disease       Past Surgical History:   Procedure Laterality Date    BURSECTOMY HIP Left 2024    Procedure: TROCHANTERIC BURSECTOMY;  Surgeon: Kaleb Issa MD;  Location: [facility] OR    REPAIR TENDON HAMSTRING Left 2024    Procedure: OPEN LEFT HIP GLUTEUS MEDIUS TENDON REPAIR WITH ILIOTIBIAL BAND LENGTHENING;  Surgeon: Kaleb Issa MD;  Location: [facility] OR      Allergies   Allergen Reactions    Ciprofloxacin Hives and Rash     \"Many years ago\"    Oxycodone Other (See Comments)     Does NOT like-makes her feel weird, dizzy, weak      Social History     Tobacco Use    Smoking status: Never    Smokeless tobacco: Never   Substance Use Topics    Alcohol use: Not Currently      Wt Readings from Last 1 Encounters:   25 75.8 kg (167 lb)        Anesthesia Evaluation   Pt has had prior anesthetic.     No history of anesthetic complications       ROS/MED HX  ENT/Pulmonary:  - neg pulmonary ROS     Neurologic:  - neg neurologic ROS     Cardiovascular:     (+) Dyslipidemia - -   -  - -                                   (-) BORJA, orthopnea/PND and murmur   METS/Exercise Tolerance: 4 - Raking leaves, gardening    Hematologic:  - neg hematologic  ROS     Musculoskeletal:  - neg musculoskeletal ROS     GI/Hepatic:  - neg GI/hepatic ROS     Renal/Genitourinary:  - neg Renal ROS     Endo:     (+)          thyroid problem,            Psychiatric/Substance Use:  - neg psychiatric ROS     Infectious Disease:  - neg infectious disease ROS     Malignancy:  - neg malignancy ROS     Other:  - neg other ROS          Physical Exam    Airway        Mallampati: I   TM distance: > 3 FB   Neck ROM: full   Mouth opening: > 3 cm    Respiratory Devices and " "Support         Dental       (+) Minor Abnormalities - some fillings, tiny chips      Cardiovascular          Rhythm and rate: regular and normal (-) no murmur    Pulmonary           breath sounds clear to auscultation           OUTSIDE LABS:  CBC: No results found for: \"WBC\", \"HGB\", \"HCT\", \"PLT\"  BMP: No results found for: \"NA\", \"POTASSIUM\", \"CHLORIDE\", \"CO2\", \"BUN\", \"CR\", \"GLC\"  COAGS: No results found for: \"PTT\", \"INR\", \"FIBR\"  POC: No results found for: \"BGM\", \"HCG\", \"HCGS\"  HEPATIC: No results found for: \"ALBUMIN\", \"PROTTOTAL\", \"ALT\", \"AST\", \"GGT\", \"ALKPHOS\", \"BILITOTAL\", \"BILIDIRECT\", \"CHRIS\"  OTHER: No results found for: \"PH\", \"LACT\", \"A1C\", \"ZACHARIAH\", \"PHOS\", \"MAG\", \"LIPASE\", \"AMYLASE\", \"TSH\", \"T4\", \"T3\", \"CRP\", \"SED\"    Anesthesia Plan    ASA Status:  2    NPO Status:  NPO Appropriate    Anesthesia Type: General.     - Airway: ETT   Induction: Intravenous.   Maintenance: Balanced.   Techniques and Equipment:       - Drips/Meds: Ketamine, Dexmed. bolus     Consents    Anesthesia Plan(s) and associated risks, benefits, and realistic alternatives discussed. Questions answered and patient/representative(s) expressed understanding.     - Discussed:     - Discussed with:  Patient      - Extended Intubation/Ventilatory Support Discussed: No.      - Patient is DNR/DNI Status: No     Use of blood products discussed: Yes.     - Discussed with: Patient.     - Consented: consented to blood products     Postoperative Care    Pain management: IV analgesics, Oral pain medications, Multi-modal analgesia.   PONV prophylaxis: Ondansetron (or other 5HT-3), Dexamethasone or Solumedrol, Background Propofol Infusion     Comments:    Other Comments: Daughter is RN and states patient had uncontrolled pain in PACU last September for similar procedure on other leg. Only takes celebrex and tyelnol at home for pain. Discussed pain management plan and in addition to fentanyl/dilaudid will do preop tylenol, ketamine and precedex. Will give " "IV toradol if surgeon is okay with it at end of case. Will also ask surgeon to inject local anesthetic at incision if no contraindications. They are happy and agree to this plan. Did not like feeling of oxycodone last time either for home postop prescription. Could also consider methocarbamol in PACU if having muscle spasms postop. Will assess at that time. Denies PONV.    Plan :GETA with ETT, decadron/zofran/propofol gtt, pain plan as above.                Alicia Kent MD    Clinically Significant Risk Factors Present on Admission                             # Overweight: Estimated body mass index is 28.22 kg/m  as calculated from the following:    Height as of this encounter: 1.638 m (5' 4.5\").    Weight as of this encounter: 75.8 kg (167 lb).                "

## 2025-04-22 NOTE — INTERVAL H&P NOTE
"I have reviewed the surgical (or preoperative) H&P that is linked to this encounter, and examined the patient. There are no significant changes    Clinical Conditions Present on Arrival:  Clinically Significant Risk Factors Present on Admission                       # Overweight: Estimated body mass index is 28.22 kg/m  as calculated from the following:    Height as of this encounter: 1.638 m (5' 4.5\").    Weight as of this encounter: 75.8 kg (167 lb).       "

## 2025-04-22 NOTE — ANESTHESIA CARE TRANSFER NOTE
Patient: Michelle Isaac    Procedure: Procedure(s):  OPEN RIGHT HIP GLUTEUS MEDIUS TENDON REPAIR,  TROCHANTERIC BURSECTOMY       Diagnosis: Greater trochanteric pain syndrome [M25.559]  Tear of gluteus medius tendon [S76.019A]  Diagnosis Additional Information: No value filed.    Anesthesia Type:   General     Note:    Oropharynx: oropharynx clear of all foreign objects and spontaneously breathing  Level of Consciousness: awake  Oxygen Supplementation: face mask  Level of Supplemental Oxygen (L/min / FiO2): 10  Independent Airway: airway patency satisfactory and stable  Dentition: dentition unchanged  Vital Signs Stable: post-procedure vital signs reviewed and stable  Report to RN Given: handoff report given  Patient transferred to: PACU    Handoff Report: Identifed the Patient, Identified the Reponsible Provider, Reviewed the pertinent medical history, Discussed the surgical course, Reviewed Intra-OP anesthesia mangement and issues during anesthesia, Set expectations for post-procedure period and Allowed opportunity for questions and acknowledgement of understanding  Vitals:  Vitals Value Taken Time   /68 04/22/25 1434   Temp 36.8  C (98.3  F) 04/22/25 1434   Pulse 92 04/22/25 1438   Resp 13 04/22/25 1438   SpO2 100 % 04/22/25 1438   Vitals shown include unfiled device data.    Electronically Signed By: JAVIER Cassidy CRNA  April 22, 2025  2:39 PM

## 2025-04-27 ENCOUNTER — HOSPITAL ENCOUNTER (INPATIENT)
Facility: HOSPITAL | Age: 70
End: 2025-04-27
Attending: EMERGENCY MEDICINE | Admitting: INTERNAL MEDICINE
Payer: MEDICARE

## 2025-04-27 DIAGNOSIS — M76.01 GLUTEAL TENDINITIS OF RIGHT BUTTOCK: Primary | ICD-10-CM

## 2025-04-27 DIAGNOSIS — K57.20 DIVERTICULITIS OF LARGE INTESTINE WITH PERFORATION WITHOUT ABSCESS, UNSPECIFIED BLEEDING STATUS: ICD-10-CM

## 2025-04-27 LAB
ABO + RH BLD: NORMAL
APTT PPP: 31 SECONDS (ref 22–38)
BASOPHILS # BLD AUTO: 0 10E3/UL (ref 0–0.2)
BASOPHILS NFR BLD AUTO: 0 %
BLD GP AB SCN SERPL QL: NEGATIVE
EOSINOPHIL # BLD AUTO: 0 10E3/UL (ref 0–0.7)
EOSINOPHIL NFR BLD AUTO: 0 %
ERYTHROCYTE [DISTWIDTH] IN BLOOD BY AUTOMATED COUNT: 12.9 % (ref 10–15)
HCT VFR BLD AUTO: 37.3 % (ref 35–47)
HGB BLD-MCNC: 12.2 G/DL (ref 11.7–15.7)
HOLD SPECIMEN: NORMAL
IMM GRANULOCYTES # BLD: 0.1 10E3/UL
IMM GRANULOCYTES NFR BLD: 1 %
INR PPP: 1.04 (ref 0.85–1.15)
LACTATE SERPL-SCNC: 1.4 MMOL/L (ref 0.7–2)
LYMPHOCYTES # BLD AUTO: 1.2 10E3/UL (ref 0.8–5.3)
LYMPHOCYTES NFR BLD AUTO: 10 %
MCH RBC QN AUTO: 28.4 PG (ref 26.5–33)
MCHC RBC AUTO-ENTMCNC: 32.7 G/DL (ref 31.5–36.5)
MCV RBC AUTO: 87 FL (ref 78–100)
MONOCYTES # BLD AUTO: 0.7 10E3/UL (ref 0–1.3)
MONOCYTES NFR BLD AUTO: 6 %
NEUTROPHILS # BLD AUTO: 10.2 10E3/UL (ref 1.6–8.3)
NEUTROPHILS NFR BLD AUTO: 84 %
NRBC # BLD AUTO: 0 10E3/UL
NRBC BLD AUTO-RTO: 0 /100
PLATELET # BLD AUTO: 330 10E3/UL (ref 150–450)
RBC # BLD AUTO: 4.3 10E6/UL (ref 3.8–5.2)
SPECIMEN EXP DATE BLD: NORMAL
WBC # BLD AUTO: 12.2 10E3/UL (ref 4–11)

## 2025-04-27 PROCEDURE — 85730 THROMBOPLASTIN TIME PARTIAL: CPT | Performed by: EMERGENCY MEDICINE

## 2025-04-27 PROCEDURE — 85025 COMPLETE CBC W/AUTO DIFF WBC: CPT | Performed by: EMERGENCY MEDICINE

## 2025-04-27 PROCEDURE — 250N000011 HC RX IP 250 OP 636: Mod: JZ | Performed by: EMERGENCY MEDICINE

## 2025-04-27 PROCEDURE — 85610 PROTHROMBIN TIME: CPT | Performed by: EMERGENCY MEDICINE

## 2025-04-27 PROCEDURE — 96375 TX/PRO/DX INJ NEW DRUG ADDON: CPT

## 2025-04-27 PROCEDURE — 96374 THER/PROPH/DIAG INJ IV PUSH: CPT | Mod: 59

## 2025-04-27 PROCEDURE — 36415 COLL VENOUS BLD VENIPUNCTURE: CPT | Performed by: EMERGENCY MEDICINE

## 2025-04-27 PROCEDURE — 86901 BLOOD TYPING SEROLOGIC RH(D): CPT | Performed by: EMERGENCY MEDICINE

## 2025-04-27 PROCEDURE — 99285 EMERGENCY DEPT VISIT HI MDM: CPT | Mod: 25

## 2025-04-27 PROCEDURE — 120N000001 HC R&B MED SURG/OB

## 2025-04-27 PROCEDURE — 99222 1ST HOSP IP/OBS MODERATE 55: CPT | Mod: AI | Performed by: INTERNAL MEDICINE

## 2025-04-27 PROCEDURE — 258N000003 HC RX IP 258 OP 636: Performed by: EMERGENCY MEDICINE

## 2025-04-27 PROCEDURE — 83605 ASSAY OF LACTIC ACID: CPT | Performed by: EMERGENCY MEDICINE

## 2025-04-27 RX ORDER — SENNOSIDES 8.6 MG
1300 CAPSULE ORAL 3 TIMES DAILY PRN
COMMUNITY

## 2025-04-27 RX ORDER — PIPERACILLIN SODIUM, TAZOBACTAM SODIUM 3; .375 G/15ML; G/15ML
3.38 INJECTION, POWDER, LYOPHILIZED, FOR SOLUTION INTRAVENOUS ONCE
Status: COMPLETED | OUTPATIENT
Start: 2025-04-27 | End: 2025-04-27

## 2025-04-27 RX ORDER — ONDANSETRON 2 MG/ML
4 INJECTION INTRAMUSCULAR; INTRAVENOUS EVERY 30 MIN PRN
Status: DISCONTINUED | OUTPATIENT
Start: 2025-04-27 | End: 2025-04-28

## 2025-04-27 RX ORDER — HYDROMORPHONE HYDROCHLORIDE 1 MG/ML
0.5 INJECTION, SOLUTION INTRAMUSCULAR; INTRAVENOUS; SUBCUTANEOUS EVERY 30 MIN PRN
Status: DISCONTINUED | OUTPATIENT
Start: 2025-04-27 | End: 2025-04-28

## 2025-04-27 RX ADMIN — HYDROMORPHONE HYDROCHLORIDE 0.5 MG: 1 INJECTION, SOLUTION INTRAMUSCULAR; INTRAVENOUS; SUBCUTANEOUS at 22:58

## 2025-04-27 RX ADMIN — ONDANSETRON 4 MG: 2 INJECTION, SOLUTION INTRAMUSCULAR; INTRAVENOUS at 23:03

## 2025-04-27 RX ADMIN — SODIUM CHLORIDE 500 ML: 0.9 INJECTION, SOLUTION INTRAVENOUS at 22:48

## 2025-04-27 RX ADMIN — PIPERACILLIN AND TAZOBACTAM 3.38 G: 3; .375 INJECTION, POWDER, LYOPHILIZED, FOR SOLUTION INTRAVENOUS; PARENTERAL at 22:55

## 2025-04-27 ASSESSMENT — ACTIVITIES OF DAILY LIVING (ADL): ADLS_ACUITY_SCORE: 42

## 2025-04-27 ASSESSMENT — COLUMBIA-SUICIDE SEVERITY RATING SCALE - C-SSRS
1. IN THE PAST MONTH, HAVE YOU WISHED YOU WERE DEAD OR WISHED YOU COULD GO TO SLEEP AND NOT WAKE UP?: NO
6. HAVE YOU EVER DONE ANYTHING, STARTED TO DO ANYTHING, OR PREPARED TO DO ANYTHING TO END YOUR LIFE?: NO
2. HAVE YOU ACTUALLY HAD ANY THOUGHTS OF KILLING YOURSELF IN THE PAST MONTH?: NO

## 2025-04-28 ENCOUNTER — APPOINTMENT (OUTPATIENT)
Dept: PHYSICAL THERAPY | Facility: HOSPITAL | Age: 70
DRG: 853 | End: 2025-04-28
Attending: PHYSICIAN ASSISTANT
Payer: MEDICARE

## 2025-04-28 LAB
ALBUMIN SERPL BCG-MCNC: 3.3 G/DL (ref 3.5–5.2)
ALP SERPL-CCNC: 181 U/L (ref 40–150)
ALT SERPL W P-5'-P-CCNC: 147 U/L (ref 0–50)
ANION GAP SERPL CALCULATED.3IONS-SCNC: 8 MMOL/L (ref 7–15)
AST SERPL W P-5'-P-CCNC: 74 U/L (ref 0–45)
BASOPHILS # BLD AUTO: 0 10E3/UL (ref 0–0.2)
BASOPHILS NFR BLD AUTO: 0 %
BILIRUB SERPL-MCNC: 0.9 MG/DL
BUN SERPL-MCNC: 12 MG/DL (ref 8–23)
CALCIUM SERPL-MCNC: 8.2 MG/DL (ref 8.8–10.4)
CHLORIDE SERPL-SCNC: 103 MMOL/L (ref 98–107)
CREAT SERPL-MCNC: 0.7 MG/DL (ref 0.51–0.95)
EGFRCR SERPLBLD CKD-EPI 2021: >90 ML/MIN/1.73M2
EOSINOPHIL # BLD AUTO: 0 10E3/UL (ref 0–0.7)
EOSINOPHIL NFR BLD AUTO: 0 %
ERYTHROCYTE [DISTWIDTH] IN BLOOD BY AUTOMATED COUNT: 12.9 % (ref 10–15)
GLUCOSE SERPL-MCNC: 123 MG/DL (ref 70–99)
HCO3 SERPL-SCNC: 25 MMOL/L (ref 22–29)
HCT VFR BLD AUTO: 31.5 % (ref 35–47)
HGB BLD-MCNC: 10 G/DL (ref 11.7–15.7)
IMM GRANULOCYTES # BLD: 0.1 10E3/UL
IMM GRANULOCYTES NFR BLD: 1 %
INR PPP: 1.16 (ref 0.85–1.15)
LYMPHOCYTES # BLD AUTO: 1.4 10E3/UL (ref 0.8–5.3)
LYMPHOCYTES NFR BLD AUTO: 13 %
MCH RBC QN AUTO: 28.1 PG (ref 26.5–33)
MCHC RBC AUTO-ENTMCNC: 31.7 G/DL (ref 31.5–36.5)
MCV RBC AUTO: 89 FL (ref 78–100)
MONOCYTES # BLD AUTO: 0.7 10E3/UL (ref 0–1.3)
MONOCYTES NFR BLD AUTO: 6 %
MRSA DNA SPEC QL NAA+PROBE: NEGATIVE
NEUTROPHILS # BLD AUTO: 8.4 10E3/UL (ref 1.6–8.3)
NEUTROPHILS NFR BLD AUTO: 79 %
NRBC # BLD AUTO: 0 10E3/UL
NRBC BLD AUTO-RTO: 0 /100
PLATELET # BLD AUTO: 266 10E3/UL (ref 150–450)
POTASSIUM SERPL-SCNC: 3.7 MMOL/L (ref 3.4–5.3)
PROT SERPL-MCNC: 6.3 G/DL (ref 6.4–8.3)
RBC # BLD AUTO: 3.56 10E6/UL (ref 3.8–5.2)
SA TARGET DNA: POSITIVE
SODIUM SERPL-SCNC: 136 MMOL/L (ref 135–145)
WBC # BLD AUTO: 10.7 10E3/UL (ref 4–11)

## 2025-04-28 PROCEDURE — 99222 1ST HOSP IP/OBS MODERATE 55: CPT | Performed by: SURGERY

## 2025-04-28 PROCEDURE — 250N000013 HC RX MED GY IP 250 OP 250 PS 637: Performed by: INTERNAL MEDICINE

## 2025-04-28 PROCEDURE — 36415 COLL VENOUS BLD VENIPUNCTURE: CPT | Performed by: INTERNAL MEDICINE

## 2025-04-28 PROCEDURE — 85025 COMPLETE CBC W/AUTO DIFF WBC: CPT | Performed by: INTERNAL MEDICINE

## 2025-04-28 PROCEDURE — 99233 SBSQ HOSP IP/OBS HIGH 50: CPT | Performed by: HOSPITALIST

## 2025-04-28 PROCEDURE — 87640 STAPH A DNA AMP PROBE: CPT | Performed by: INTERNAL MEDICINE

## 2025-04-28 PROCEDURE — 85610 PROTHROMBIN TIME: CPT | Performed by: INTERNAL MEDICINE

## 2025-04-28 PROCEDURE — 80053 COMPREHEN METABOLIC PANEL: CPT | Performed by: INTERNAL MEDICINE

## 2025-04-28 PROCEDURE — 250N000011 HC RX IP 250 OP 636: Performed by: INTERNAL MEDICINE

## 2025-04-28 PROCEDURE — 97161 PT EVAL LOW COMPLEX 20 MIN: CPT | Mod: GP

## 2025-04-28 PROCEDURE — 120N000001 HC R&B MED SURG/OB

## 2025-04-28 PROCEDURE — 97116 GAIT TRAINING THERAPY: CPT | Mod: GP

## 2025-04-28 PROCEDURE — 999N000111 HC STATISTIC OT IP EVAL DEFER

## 2025-04-28 PROCEDURE — 97530 THERAPEUTIC ACTIVITIES: CPT | Mod: GP

## 2025-04-28 PROCEDURE — 258N000003 HC RX IP 258 OP 636: Performed by: INTERNAL MEDICINE

## 2025-04-28 RX ORDER — ONDANSETRON 4 MG/1
4 TABLET, ORALLY DISINTEGRATING ORAL EVERY 6 HOURS PRN
Status: DISCONTINUED | OUTPATIENT
Start: 2025-04-28 | End: 2025-05-12 | Stop reason: HOSPADM

## 2025-04-28 RX ORDER — HYDROMORPHONE HCL IN WATER/PF 6 MG/30 ML
0.4 PATIENT CONTROLLED ANALGESIA SYRINGE INTRAVENOUS
Status: DISCONTINUED | OUTPATIENT
Start: 2025-04-28 | End: 2025-05-02

## 2025-04-28 RX ORDER — AMOXICILLIN 250 MG
2 CAPSULE ORAL 2 TIMES DAILY PRN
Status: DISCONTINUED | OUTPATIENT
Start: 2025-04-28 | End: 2025-05-02

## 2025-04-28 RX ORDER — ACETAMINOPHEN 325 MG/1
650 TABLET ORAL EVERY 4 HOURS PRN
Status: DISCONTINUED | OUTPATIENT
Start: 2025-04-28 | End: 2025-05-02

## 2025-04-28 RX ORDER — ACETAMINOPHEN 650 MG/1
650 SUPPOSITORY RECTAL EVERY 4 HOURS PRN
Status: DISCONTINUED | OUTPATIENT
Start: 2025-04-28 | End: 2025-05-02

## 2025-04-28 RX ORDER — PROCHLORPERAZINE MALEATE 5 MG/1
5 TABLET ORAL EVERY 6 HOURS PRN
Status: DISCONTINUED | OUTPATIENT
Start: 2025-04-28 | End: 2025-05-12 | Stop reason: HOSPADM

## 2025-04-28 RX ORDER — ATORVASTATIN CALCIUM 10 MG/1
20 TABLET, FILM COATED ORAL AT BEDTIME
Status: DISCONTINUED | OUTPATIENT
Start: 2025-04-28 | End: 2025-05-02

## 2025-04-28 RX ORDER — SODIUM CHLORIDE 9 MG/ML
INJECTION, SOLUTION INTRAVENOUS CONTINUOUS
Status: DISCONTINUED | OUTPATIENT
Start: 2025-04-28 | End: 2025-04-29

## 2025-04-28 RX ORDER — HYDROMORPHONE HYDROCHLORIDE 2 MG/1
2 TABLET ORAL EVERY 4 HOURS PRN
Status: DISCONTINUED | OUTPATIENT
Start: 2025-04-28 | End: 2025-05-01

## 2025-04-28 RX ORDER — PIPERACILLIN SODIUM, TAZOBACTAM SODIUM 3; .375 G/15ML; G/15ML
3.38 INJECTION, POWDER, LYOPHILIZED, FOR SOLUTION INTRAVENOUS EVERY 6 HOURS
Status: DISCONTINUED | OUTPATIENT
Start: 2025-04-28 | End: 2025-05-12

## 2025-04-28 RX ORDER — LIDOCAINE 40 MG/G
CREAM TOPICAL
Status: DISCONTINUED | OUTPATIENT
Start: 2025-04-28 | End: 2025-05-02

## 2025-04-28 RX ORDER — LEVOTHYROXINE SODIUM 25 UG/1
75 TABLET ORAL DAILY
Status: DISCONTINUED | OUTPATIENT
Start: 2025-04-28 | End: 2025-05-12 | Stop reason: HOSPADM

## 2025-04-28 RX ORDER — NALOXONE HYDROCHLORIDE 0.4 MG/ML
0.4 INJECTION, SOLUTION INTRAMUSCULAR; INTRAVENOUS; SUBCUTANEOUS
Status: DISCONTINUED | OUTPATIENT
Start: 2025-04-28 | End: 2025-05-04

## 2025-04-28 RX ORDER — AMOXICILLIN 250 MG
1 CAPSULE ORAL 2 TIMES DAILY PRN
Status: DISCONTINUED | OUTPATIENT
Start: 2025-04-28 | End: 2025-05-12 | Stop reason: HOSPADM

## 2025-04-28 RX ORDER — CALCIUM CARBONATE 500 MG/1
1000 TABLET, CHEWABLE ORAL 4 TIMES DAILY PRN
Status: DISCONTINUED | OUTPATIENT
Start: 2025-04-28 | End: 2025-05-12 | Stop reason: HOSPADM

## 2025-04-28 RX ORDER — HYDROMORPHONE HCL IN WATER/PF 6 MG/30 ML
0.2 PATIENT CONTROLLED ANALGESIA SYRINGE INTRAVENOUS
Status: DISCONTINUED | OUTPATIENT
Start: 2025-04-28 | End: 2025-05-02

## 2025-04-28 RX ORDER — NALOXONE HYDROCHLORIDE 0.4 MG/ML
0.2 INJECTION, SOLUTION INTRAMUSCULAR; INTRAVENOUS; SUBCUTANEOUS
Status: DISCONTINUED | OUTPATIENT
Start: 2025-04-28 | End: 2025-05-04

## 2025-04-28 RX ORDER — ONDANSETRON 2 MG/ML
4 INJECTION INTRAMUSCULAR; INTRAVENOUS EVERY 6 HOURS PRN
Status: DISCONTINUED | OUTPATIENT
Start: 2025-04-28 | End: 2025-05-12 | Stop reason: HOSPADM

## 2025-04-28 RX ADMIN — PIPERACILLIN AND TAZOBACTAM 3.38 G: 3; .375 INJECTION, POWDER, FOR SOLUTION INTRAVENOUS at 18:01

## 2025-04-28 RX ADMIN — PIPERACILLIN AND TAZOBACTAM 3.38 G: 3; .375 INJECTION, POWDER, FOR SOLUTION INTRAVENOUS at 10:52

## 2025-04-28 RX ADMIN — SODIUM CHLORIDE: 0.9 INJECTION, SOLUTION INTRAVENOUS at 01:26

## 2025-04-28 RX ADMIN — PIPERACILLIN AND TAZOBACTAM 3.38 G: 3; .375 INJECTION, POWDER, FOR SOLUTION INTRAVENOUS at 05:40

## 2025-04-28 RX ADMIN — PIPERACILLIN AND TAZOBACTAM 3.38 G: 3; .375 INJECTION, POWDER, FOR SOLUTION INTRAVENOUS at 22:28

## 2025-04-28 RX ADMIN — LEVOTHYROXINE SODIUM 75 MCG: 0.03 TABLET ORAL at 09:02

## 2025-04-28 RX ADMIN — SODIUM CHLORIDE: 0.9 INJECTION, SOLUTION INTRAVENOUS at 22:28

## 2025-04-28 RX ADMIN — SODIUM CHLORIDE 1750 MG: 0.9 INJECTION, SOLUTION INTRAVENOUS at 03:10

## 2025-04-28 RX ADMIN — ACETAMINOPHEN 650 MG: 325 TABLET ORAL at 18:19

## 2025-04-28 RX ADMIN — ACETAMINOPHEN 650 MG: 325 TABLET ORAL at 01:18

## 2025-04-28 RX ADMIN — ATORVASTATIN CALCIUM 20 MG: 10 TABLET, FILM COATED ORAL at 21:08

## 2025-04-28 RX ADMIN — ATORVASTATIN CALCIUM 20 MG: 10 TABLET, FILM COATED ORAL at 01:18

## 2025-04-28 RX ADMIN — ACETAMINOPHEN 650 MG: 325 TABLET ORAL at 10:50

## 2025-04-28 ASSESSMENT — ACTIVITIES OF DAILY LIVING (ADL)
ADLS_ACUITY_SCORE: 22
ADLS_ACUITY_SCORE: 21
ADLS_ACUITY_SCORE: 34
ADLS_ACUITY_SCORE: 22
ADLS_ACUITY_SCORE: 37
ADLS_ACUITY_SCORE: 19
ADLS_ACUITY_SCORE: 21
ADLS_ACUITY_SCORE: 34
ADLS_ACUITY_SCORE: 37
ADLS_ACUITY_SCORE: 37
ADLS_ACUITY_SCORE: 22
ADLS_ACUITY_SCORE: 22
ADLS_ACUITY_SCORE: 37
ADLS_ACUITY_SCORE: 34
ADLS_ACUITY_SCORE: 22
ADLS_ACUITY_SCORE: 37
ADLS_ACUITY_SCORE: 34
ADLS_ACUITY_SCORE: 22
ADLS_ACUITY_SCORE: 34
ADLS_ACUITY_SCORE: 22
ADLS_ACUITY_SCORE: 22
ADLS_ACUITY_SCORE: 34
ADLS_ACUITY_SCORE: 22

## 2025-04-28 NOTE — ED PROVIDER NOTES
NAME: Michelle Isaac  AGE: 70 year old female  YOB: 1955  MRN: [id number]  EVALUATION DATE & TIME: No admission date for patient encounter.    PCP: [place]    ED PROVIDER: Lopez Mederos M.D.      Chief Complaint   Patient presents with    Abdominal Pain     FINAL IMPRESSION:  1. Diverticulitis of large intestine with perforation without abscess, unspecified bleeding status      MEDICAL DECISION MAKIN:34 PM I met with the patient, obtained history, performed an initial exam, and discussed options and plan for diagnostics and treatment here in the ED.   10:02 PM Spoke with Dr. Zeng, general surgery.  10:39 PM Reassessed and updated patient with findings.  11:01 PM Spoke with Dr. Sorensen, hospitalist.    Patient was clinically assessed and consented to treatment. After assessment, medical decision making and workup were discussed with the patient. The patient was agreeable to plan for testing, workup, and treatment.  Pertinent Labs & Imaging studies reviewed. (See chart for details)       Medical Decision Making  I reviewed the EMR: Outpatient Record: Emergency room notes from tonight, 2025.  Care impacted by recent right hip surgery for tendon release.  I considered additional work up, including CT with contrast, but deferred pending imaging being pushed through for surgery to evaluate the images  I discussed the care with another health care provider: General surgery and hospitalist  Admit.    MIPS (CTPE, Dental pain, Luz, Sinusitis, Asthma/COPD, Head Trauma): Not Applicable    SEPSIS: None    Michelle Isaac is a 70 year old female who presents with abdominal pain.   Differential diagnosis includes but not limited to perforated diverticulitis, abdominal abscess, diverticulitis, sepsis, SIRS.  Patient 70-year-old female who report had been given from emergency room.  Patient had presented there for left lower quadrant abdominal pain with some peritonitic symptoms.   Patient patient found to have perforated diverticulitis.  She does have findings on her right hip which likely are postsurgical from her recent tendon surgery.  Patient did have white count and recheck of labs here showed no lactic acidosis and patient otherwise stable on vital signs.  The Zosyn was started after evaluation and discussion with patient.  Patient will need admission and I will page hospitalist.  Additionally I did page general surgery and they will look at her outside CT once it is transferred through and call back if need for operative management.  At this time I feel likely this could be managed with medical treatment and eval for delayed surgical treatment or IR drainage if abscess develops.  Patient comfortable with this plan and after discussion with hospitalist patient will be admitted.    0 minutes of critical care time    MEDICATIONS GIVEN IN THE EMERGENCY:  Medications   piperacillin-tazobactam (ZOSYN) 3.375 g vial to attach to  mL bag (3.375 g Intravenous $New Bag 4/28/25 0540)   lidocaine 1 % 0.1-1 mL (has no administration in time range)   lidocaine (LMX4) cream (has no administration in time range)   sodium chloride (PF) 0.9% PF flush 3 mL (3 mLs Intracatheter Not Given 4/28/25 0540)   sodium chloride (PF) 0.9% PF flush 3 mL (3 mLs Intracatheter $Given 4/28/25 0119)   senna-docusate (SENOKOT-S/PERICOLACE) 8.6-50 MG per tablet 1 tablet (has no administration in time range)     Or   senna-docusate (SENOKOT-S/PERICOLACE) 8.6-50 MG per tablet 2 tablet (has no administration in time range)   calcium carbonate (TUMS) chewable tablet 1,000 mg (has no administration in time range)   acetaminophen (TYLENOL) tablet 650 mg (650 mg Oral $Given 4/28/25 0118)     Or   acetaminophen (TYLENOL) Suppository 650 mg ( Rectal See Alternative 4/28/25 0118)   HYDROmorphone (DILAUDID) half-tab 1 mg (has no administration in time range)   HYDROmorphone (DILAUDID) tablet 2 mg (has no administration in time  range)   HYDROmorphone (DILAUDID) injection 0.2 mg (has no administration in time range)   HYDROmorphone (DILAUDID) injection 0.4 mg (has no administration in time range)   ondansetron (ZOFRAN ODT) ODT tab 4 mg (has no administration in time range)     Or   ondansetron (ZOFRAN) injection 4 mg (has no administration in time range)   prochlorperazine (COMPAZINE) injection 5 mg (has no administration in time range)     Or   prochlorperazine (COMPAZINE) tablet 5 mg (has no administration in time range)   sodium chloride 0.9 % infusion ( Intravenous $New Bag 4/28/25 0126)   atorvastatin (LIPITOR) tablet 20 mg (20 mg Oral $Given 4/28/25 0118)   levothyroxine (SYNTHROID/LEVOTHROID) tablet 75 mcg (has no administration in time range)   naloxone (NARCAN) injection 0.2 mg (has no administration in time range)     Or   naloxone (NARCAN) injection 0.4 mg (has no administration in time range)     Or   naloxone (NARCAN) injection 0.2 mg (has no administration in time range)     Or   naloxone (NARCAN) injection 0.4 mg (has no administration in time range)   vancomycin (VANCOCIN) 1,250 mg in 0.9% NaCl 262.5 mL intermittent infusion (has no administration in time range)   sodium chloride 0.9% BOLUS 500 mL (500 mLs Intravenous $New Bag 4/27/25 2248)   piperacillin-tazobactam (ZOSYN) 3.375 g vial to attach to  mL bag (3.375 g Intravenous $New Bag 4/27/25 2255)   vancomycin (VANCOCIN) 1,750 mg in 0.9% NaCl 517.5 mL intermittent infusion (1,750 mg Intravenous $New Bag 4/28/25 0310)       NEW PRESCRIPTIONS STARTED AT TODAY'S ER VISIT:  Current Discharge Medication List             =================================================================    HPI    Patient information was obtained from: Patient and Chart    Use of : N/A        Michelle Isaac is a 70 year old female with a past medical history of hyperglycemia and hypothyroidism, who presents with abdominal pain. The patient states that she recently underwent  surgery to repair a right gluteal tendon 5 days ago, and was taking Vicodin due to this. She reports difficulty having a bowel movement since that time and feels constipated. For the past several days she has had progressive lower abdominal pain which has become severe today. She has tried multiple stool softeners and MiraLAX without success. She denies any fever or vomiting. She denies any urinary symptoms.     Per chart review, patient presents to the urgency room on 04/27/2025 with recent tendon surgery and a few days of lower abdominal pain prompting her to go to emergency room. Patient found to have abd free air likely from perf diverculitis. Patient vital signs have been stable and awaiting metabolic panel. Patient coming with  and has not received IV antibiotics yet. Images to be pushed to ER.      REVIEW OF SYSTEMS   Refer to HPI.      PAST MEDICAL HISTORY:  Past Medical History:   Diagnosis Date    Thyroid disease        PAST SURGICAL HISTORY:  Past Surgical History:   Procedure Laterality Date    ABDUCTOR REPAIR, HIP Right 4/22/2025    Procedure: OPEN RIGHT HIP GLUTEUS MEDIUS TENDON REPAIR,;  Surgeon: Kaleb Issa MD;  Location: [facility]    BURSECTOMY HIP Left 9/13/2024    Procedure: TROCHANTERIC BURSECTOMY;  Surgeon: Kaleb Issa MD;  Location: [facility]    BURSECTOMY HIP Right 4/22/2025    Procedure: TROCHANTERIC BURSECTOMY RIGHT;  Surgeon: Kaleb Issa MD;  Location: [facility]    REPAIR TENDON HAMSTRING Left 9/13/2024    Procedure: OPEN LEFT HIP GLUTEUS MEDIUS TENDON REPAIR WITH ILIOTIBIAL BAND LENGTHENING;  Surgeon: Kaleb Issa MD;  [facility]       CURRENT MEDICATIONS:      Current Facility-Administered Medications:     acetaminophen (TYLENOL) tablet 650 mg, 650 mg, Oral, Q4H PRN, 650 mg at 04/28/25 0118 **OR** acetaminophen (TYLENOL) Suppository 650 mg, 650 mg, Rectal, Q4H PRN, Presley Sorensen MD    atorvastatin (LIPITOR)  tablet 20 mg, 20 mg, Oral, At Bedtime, Presley Sorensen MD, 20 mg at 04/28/25 0118    calcium carbonate (TUMS) chewable tablet 1,000 mg, 1,000 mg, Oral, 4x Daily PRN, Presley Sorensen MD    HYDROmorphone (DILAUDID) half-tab 1 mg, 1 mg, Oral, Q4H PRN, Presley Sorensen MD    HYDROmorphone (DILAUDID) injection 0.2 mg, 0.2 mg, Intravenous, Q2H PRN, Presley Soresnen MD    HYDROmorphone (DILAUDID) injection 0.4 mg, 0.4 mg, Intravenous, Q2H PRN, Presley Sorensen MD    HYDROmorphone (DILAUDID) tablet 2 mg, 2 mg, Oral, Q4H PRN, Presley Sorensen MD    levothyroxine (SYNTHROID/LEVOTHROID) tablet 75 mcg, 75 mcg, Oral, Daily, Presley Sorensen MD    lidocaine (LMX4) cream, , Topical, Q1H PRN, Presley Sorensen MD    lidocaine 1 % 0.1-1 mL, 0.1-1 mL, Other, Q1H PRN, Presley Sorensen MD    naloxone (NARCAN) injection 0.2 mg, 0.2 mg, Intravenous, Q2 Min PRN **OR** naloxone (NARCAN) injection 0.4 mg, 0.4 mg, Intravenous, Q2 Min PRN **OR** naloxone (NARCAN) injection 0.2 mg, 0.2 mg, Intramuscular, Q2 Min PRN **OR** naloxone (NARCAN) injection 0.4 mg, 0.4 mg, Intramuscular, Q2 Min PRN, Presley Sorensen MD    ondansetron (ZOFRAN ODT) ODT tab 4 mg, 4 mg, Oral, Q6H PRN **OR** ondansetron (ZOFRAN) injection 4 mg, 4 mg, Intravenous, Q6H PRN, Presley Sorensen MD    piperacillin-tazobactam (ZOSYN) 3.375 g vial to attach to  mL bag, 3.375 g, Intravenous, Q6H, Presley Sorensen MD, 3.375 g at 04/28/25 0540    prochlorperazine (COMPAZINE) injection 5 mg, 5 mg, Intravenous, Q6H PRN **OR** prochlorperazine (COMPAZINE) tablet 5 mg, 5 mg, Oral, Q6H PRN, Presley Sorensen MD    senna-docusate (SENOKOT-S/PERICOLACE) 8.6-50 MG per tablet 1 tablet, 1 tablet, Oral, BID PRN **OR** senna-docusate (SENOKOT-S/PERICOLACE) 8.6-50 MG per tablet 2 tablet, 2 tablet, Oral, BID PRN, Presley Sorensen MD    sodium chloride (PF) 0.9% PF flush 3 mL, 3 mL, Intracatheter, Q8H [facility]  "MD Mikal    sodium chloride (PF) 0.9% PF flush 3 mL, 3 mL, Intracatheter, q1 min prn, Presley Sorensen MD, 3 mL at 04/28/25 0119    sodium chloride 0.9 % infusion, , Intravenous, Continuous, Presley Sorensen MD, Last Rate: 100 mL/hr at 04/28/25 0126, New Bag at 04/28/25 0126    vancomycin (VANCOCIN) 1,250 mg in 0.9% NaCl 262.5 mL intermittent infusion, 1,250 mg, Intravenous, Q18H, Presley Sorensen MD    ALLERGIES:  Allergies   Allergen Reactions    Ciprofloxacin Hives and Rash     \"Many years ago\"    Oxycodone Other (See Comments)     Does NOT like-makes her feel weird, dizzy, weak       FAMILY HISTORY:  History reviewed. No pertinent family history.    SOCIAL HISTORY:   Social History     Socioeconomic History    Marital status:    Tobacco Use    Smoking status: Never    Smokeless tobacco: Never   Substance and Sexual Activity    Alcohol use: Not Currently    Drug use: Never     Social Drivers of Health     Financial Resource Strain: Low Risk  (9/13/2024)    Financial Resource Strain     Within the past 12 months, have you or your family members you live with been unable to get utilities (heat, electricity) when it was really needed?: No   Food Insecurity: Low Risk  (9/13/2024)    Food Insecurity     Within the past 12 months, did you worry that your food would run out before you got money to buy more?: No     Within the past 12 months, did the food you bought just not last and you didn t have money to get more?: No   Transportation Needs: Low Risk  (9/13/2024)    Transportation Needs     Within the past 12 months, has lack of transportation kept you from medical appointments, getting your medicines, non-medical meetings or appointments, work, or from getting things that you need?: No   Physical Activity: Insufficiently Active (3/16/2024)    Received from [facility]    Exercise Vital Sign     Days of Exercise per Week: 3 days     Minutes of Exercise per Session: 30 min   Stress: No Stress " Concern Present (1/9/2022)    Received from [facility] - Occupational Stress Questionnaire     Feeling of Stress : Not at all   Social Connections: Socially Integrated (9/5/2024)    Received from [facility]    Social Connections     Do you often feel lonely or isolated from those around you?: 0   Interpersonal Safety: Low Risk  (4/22/2025)    Interpersonal Safety     Do you feel physically and emotionally safe where you currently live?: Yes     Within the past 12 months, have you been hit, slapped, kicked or otherwise physically hurt by someone?: No     Within the past 12 months, have you been humiliated or emotionally abused in other ways by your partner or ex-partner?: No   Housing Stability: Low Risk  (9/13/2024)    Housing Stability     Do you have housing? : Yes     Are you worried about losing your housing?: No       PHYSICAL EXAM:    Vitals: /61 (BP Location: Right arm)   Pulse 89   Temp 98.9  F (37.2  C) (Oral)   Resp 16   Wt 76.2 kg (167 lb 15.9 oz)   SpO2 93%   BMI 28.39 kg/m     Physical Exam  Vitals and nursing note reviewed.   Constitutional:       General: She is not in acute distress.     Appearance: She is well-developed and normal weight. She is not ill-appearing or toxic-appearing.   HENT:      Head: Normocephalic.   Eyes:      General: No scleral icterus.  Cardiovascular:      Rate and Rhythm: Normal rate and regular rhythm.      Heart sounds: Normal heart sounds.   Pulmonary:      Effort: Pulmonary effort is normal. No respiratory distress.      Breath sounds: Normal breath sounds.   Abdominal:      General: Abdomen is flat.      Palpations: Abdomen is soft.      Tenderness: There is abdominal tenderness in the left lower quadrant. There is no right CVA tenderness, left CVA tenderness, guarding or rebound.      Hernia: No hernia is present.   Musculoskeletal:      Comments: Brace wrapped around hips over  right hip for support.   Skin:     General: Skin is warm and dry.      Coloration: Skin is not jaundiced.   Neurological:      General: No focal deficit present.      Mental Status: She is alert.   Psychiatric:         Behavior: Behavior normal.           LAB:  All pertinent labs reviewed and interpreted.  Labs Ordered and Resulted from Time of ED Arrival to Time of ED Departure   CBC WITH PLATELETS AND DIFFERENTIAL - Abnormal       Result Value    WBC Count 12.2 (*)     RBC Count 4.30      Hemoglobin 12.2      Hematocrit 37.3      MCV 87      MCH 28.4      MCHC 32.7      RDW 12.9      Platelet Count 330      % Neutrophils 84      % Lymphocytes 10      % Monocytes 6      % Eosinophils 0      % Basophils 0      % Immature Granulocytes 1      NRBCs per 100 WBC 0      Absolute Neutrophils 10.2 (*)     Absolute Lymphocytes 1.2      Absolute Monocytes 0.7      Absolute Eosinophils 0.0      Absolute Basophils 0.0      Absolute Immature Granulocytes 0.1      Absolute NRBCs 0.0     INR - Normal    INR 1.04     PARTIAL THROMBOPLASTIN TIME - Normal    aPTT 31     LACTIC ACID WHOLE BLOOD WITH 1X REPEAT IN 2 HR WHEN >2 - Normal    Lactic Acid, Initial 1.4     TYPE AND SCREEN, ADULT    ABO/RH(D) A POS      Antibody Screen Negative      SPECIMEN EXPIRATION DATE [id number]     ABO/RH TYPE AND SCREEN       RADIOLOGY:  CT External Imaging Abdomen   Final Result          PROCEDURES:   Procedures       I, Latonya Grubbs, am serving as a scribe to document services personally performed by Dr. Lopez Mederos  based on my observation and the provider's statements to me. I, Lopez Mederos MD attest that Latonya Grubbs is acting in a scribe capacity, has observed my performance of the services and has documented them in accordance with my direction.      Lopez Mederos M.D.  Emergency Medicine  [facility] Emergency Department     Lopez Mederos MD  04/28/25 0600

## 2025-04-28 NOTE — PLAN OF CARE
Problem: Adult Inpatient Plan of Care  Goal: Optimal Comfort and Wellbeing  Outcome: Progressing     Problem: Delirium  Goal: Improved Behavioral Control  Outcome: Progressing  Intervention: Minimize Safety Risk  Recent Flowsheet Documentation  Taken 4/28/2025 0013 by Serenity Villafana RN  Enhanced Safety Measures: pain management     Problem: Adult Inpatient Plan of Care  Goal: Absence of Hospital-Acquired Illness or Injury  Intervention: Identify and Manage Fall Risk  Recent Flowsheet Documentation  Taken 4/28/2025 0013 by Serenity Villafana RN  Safety Promotion/Fall Prevention:   assistive device/personal items within reach   clutter free environment maintained   nonskid shoes/slippers when out of bed   safety round/check completed   room organization consistent   patient and family education  Intervention: Prevent Skin Injury  Recent Flowsheet Documentation  Taken 4/28/2025 0013 by Serenity Villafana RN  Body Position: position maintained  Intervention: Prevent Infection  Recent Flowsheet Documentation  Taken 4/28/2025 0013 by Serenity Villafana RN  Infection Prevention: hand hygiene promoted  Goal: Readiness for Transition of Care  Recent Flowsheet Documentation  Taken 4/28/2025 0000 by Serenity Villafana RN  Transportation Anticipated: family or friend will provide  Intervention: Mutually Develop Transition Plan  Recent Flowsheet Documentation  Taken 4/28/2025 0000 by Serenity Villafana RN  Transportation Anticipated: family or friend will provide  Patient/Family Anticipated Services at Transition: none  Patient/Family Anticipates Transition to: home with family  Equipment Currently Used at Home:   crutches   walker, rolling   cane, quad   commode chair   wheelchair, manual     Problem: Pain Acute  Goal: Optimal Pain Control and Function  Intervention: Prevent or Manage Pain  Recent Flowsheet Documentation  Taken 4/28/2025 0013 by Serenity Villafana RN  Medication Review/Management: medications reviewed   Goal Outcome Evaluation:       Pt is  A/O x4. Transfer from ED via hover mat. C/O right hip pain from previous surgery and headache. Prn APAPP administer and effective. No weight bearing on right leg, pt also has a brace on her leg. VSS continue to monitor.Serenity Villafana RN

## 2025-04-28 NOTE — PHARMACY-VANCOMYCIN DOSING SERVICE
Pharmacy Vancomycin Initial Note  Date of Service 2025  Patient's  1955  70 year old, female    Indication: Sepsis    Current estimated CrCl = CrCl cannot be calculated (No successful lab value found.). Manually calculated to be approximately ~62 ml/min    Creatinine for last 3 days  No results found for requested labs within last 3 days. Creatinine is 0.83 per [facility] as of 25 at 20:44    Recent Vancomycin Level(s) for last 3 days  No results found for requested labs within last 3 days.      Vancomycin IV Administrations (past 72 hours)        No vancomycin orders with administrations in past 72 hours.                    Nephrotoxins and other renal medications (From now, onward)      Start     Dose/Rate Route Frequency Ordered Stop    25 2000  vancomycin (VANCOCIN) 1,250 mg in 0.9% NaCl 262.5 mL intermittent infusion         1,250 mg  over 90 Minutes Intravenous EVERY 18 HOURS 25 0150      25 0500  piperacillin-tazobactam (ZOSYN) 3.375 g vial to attach to  mL bag         3.375 g  over 30 Minutes Intravenous EVERY 6 HOURS 25 0103      25 0200  vancomycin (VANCOCIN) 1,750 mg in 0.9% NaCl 517.5 mL intermittent infusion         1,750 mg  over 2 Hours Intravenous ONCE 25 0150              Contrast Orders - past 72 hours (72h ago, onward)      None            [employer] Prediction of Planned Initial Vancomycin Regimen    Loading dose: 1750 mg at 02:00 2025.  Regimen: 1250 mg IV every 18 hours.  Start time: 20:00 on 2025  Exposure target: AUC24 (range)400-600 mg/L.hr   AUC24,ss: 538 mg/L.hr  Probability of AUC24 > 400: 80 %  Ctrough,ss: 16.3 mg/L  Probability of Ctrough,ss > 20: 33 %  Probability of nephrotoxicity (Lodise MARTHA ): 12 %        Plan:  Start vancomycin  1750 mg IV once for loading dose then 1250mg IV q18h.   Vancomycin monitoring method: AUC  Vancomycin therapeutic monitoring goal: 400-600 mg*h/L  Pharmacy will check  vancomycin levels as appropriate in 1-3 Days.    Serum creatinine levels will be ordered a minimum of twice weekly.      Sushil HigginbothamD

## 2025-04-28 NOTE — PROGRESS NOTES
Dual Skin Assessment Note:    Patient transferred from ED from to P2.     Comprehensive skin inspection completed by myself and Suni Plummer RN.     Abnormal skin assessment findings: no    LDA Initiated for skin breakdown/non-blanchable redness: Not applicable    Provider notified: No    If yes, WOC Consult order obtained: Not applicable    Serenity Villafana RN 6:18 AM

## 2025-04-28 NOTE — PROGRESS NOTES
Michelle was seen for adjustment of a Hip abduction orthosis per order of her physician, that was received outside of [facility]. Her mood today was pleasant.     Her primary complaint was that the thigh cuff was digging into the gluteal region and causing skin breakdown from the sharp edge. Inspection of the orthosis showed that the strap clip dug into her tissue quite heavily.     To help her today, the thigh extension was contoured to the shape of her thigh to pull the edge away from her skin. The region around the strap clip was also further covered with neoprene to soften the edge even further.     She reported that the changes made were significantly more comfortable and was happy with the results of my visit today.     The modifications took approximately 30 minutes to contour the uprights without impacting the abduction ROM.     I plan to see her PRN.       Electronically signed by Abdulaziz Rodriguez CPO, KJ  [facility]  [phone]

## 2025-04-28 NOTE — ED NOTES
ADULT ASSESSMENT:    ARRIVES VIA: car with     PER CHIEF COMPLAINT OF:  abd pain.  ---had surgery on Tuesday gluteus tear repair.     SYMPTOMS: low abd pain.     TIME ONSET: Friday after having a BM, states pain started right after that.  Sharp, intermittent.  Took vicodin last Saturday. Pt taking celebrex and tylenol since then.      AIRWAY: patent     BREATHING: non labored     CIRCULATION: good cwms bp 149/72.      BLEEDING: none    PAIN: 8/10    DEFORMITY TO: none    SWELLING/EDEMA: none    PT LAST TETANUS: na    PT MENTAL STATUS: a/o x 4, PREVIOUS MENTAL STATUS: same    PT TRANSFERS VIA: walker , wheelchair and crutches and assist    PT LIVING SITUATION home with     PT LAST FOOD: smoothie  this morning    PT LAST PO FLUIDS: water since arrival.  Asked pt not to have any more at this time.    PT LAST ETOH/DRUG USE: none  TYPE na    MEDICATIONS PTA: 1830 tylenol    BLOOD  THINNERS: none    CARDIAC HISTORY: none    RESPIRATORY HISTORY: none    GI HISTORY: none    EMERGENCY CONTACT INFO: Susan Macario - daughter who is a nurse [phone]    VISITORS HERE: [employer]  PHONE # [phone]    OTHER INFO: seen in [facility] and diagnosed with diverticulitis.

## 2025-04-28 NOTE — PROGRESS NOTES
04/28/25 1155   Appointment Info   Signing Clinician's Name / Credentials (PT) Khushbu Rivas DPT   Living Environment   People in Home spouse   Current Living Arrangements house   Home Accessibility stairs to enter home   Number of Stairs, Main Entrance 2   Stair Railings, Main Entrance none   Transportation Anticipated family or friend will provide   Living Environment Comments has been using crutches to get in/out of house. able to stay on main level   Self-Care   Usual Activity Tolerance moderate   Current Activity Tolerance fair   Regular Exercise No   Equipment Currently Used at Home crutches;walker, rolling;cane, quad;commode chair;wheelchair, manual   Fall history within last six months no   Activity/Exercise/Self-Care Comment surgery 4/22 for gluteal tendon repair - TTWB/foot flat/20#. Spouse has been assisting a lot more since surgery   General Information   Onset of Illness/Injury or Date of Surgery 04/27/25   Referring Physician Vick Best PA-C   Patient/Family Therapy Goals Statement (PT) return home, no pain   Pertinent History of Current Problem (include personal factors and/or comorbidities that impact the POC) 70-year-old female with recent surgery to RLE presented with abdominal pain and found to have perforated diverticulitis.   Existing Precautions/Restrictions weight bearing  (RLE TTWB/20# foot flat)   Weight-Bearing Status - RLE toe touch weight-bearing  (20 #)   Cognition   Affect/Mental Status (Cognition) WFL   Pain Assessment   Patient Currently in Pain Yes, see Vital Sign flowsheet  (abdomen)   Range of Motion (ROM)   Range of Motion ROM deficits secondary to surgical procedure   Strength (Manual Muscle Testing)   Strength (Manual Muscle Testing) Deficits observed during functional mobility   Bed Mobility   Bed Mobility supine-sit;sit-supine   Supine-Sit Hendricks (Bed Mobility) contact guard   Sit-Supine Hendricks (Bed Mobility) moderate assist (50% patient  effort)   Comment, (Bed Mobility) assist for BLEs into bed, inc education re: hip precautions   Transfers   Transfers sit-stand transfer   Maintains Weight-bearing Status (Transfers) verbal cues to maintain   Sit-Stand Transfer   Sit-Stand Nance (Transfers) contact guard   Assistive Device (Sit-Stand Transfers) walker, front-wheeled   Comment, (Sit-Stand Transfer) wanting to pull to stand  with walker   Gait/Stairs (Locomotion)   Nance Level (Gait) contact guard   Assistive Device (Gait) walker, front-wheeled   Distance in Feet (Gait) 15'   Pattern (Gait) step-to   Deviations/Abnormal Patterns (Gait) right sided deviations;antalgic;lou decreased;festinating/shuffling;gait speed decreased   Right Sided Gait Deviations foot drop/toe drag   Comment, (Gait/Stairs) stairs deferred, pt inc pain with inc amb   Clinical Impression   Criteria for Skilled Therapeutic Intervention Yes, treatment indicated   PT Diagnosis (PT) impaired functional mobility, gait abnormality   Influenced by the following impairments decreased strength, decreased endurance   Functional limitations due to impairments gait, transfers, bed mob, stairs   Clinical Presentation (PT Evaluation Complexity) stable   Clinical Presentation Rationale pt presents as medically diagnosed   Clinical Decision Making (Complexity) low complexity   Planned Therapy Interventions (PT) balance training;bed mobility training;gait training;home exercise program;neuromuscular re-education;patient/family education;strengthening;transfer training;stair training   Risk & Benefits of therapy have been explained evaluation/treatment results reviewed;patient   PT Total Evaluation Time   PT Eval, Low Complexity Minutes (84083) 10   Physical Therapy Goals   PT Frequency Daily   PT Predicted Duration/Target Date for Goal Attainment 05/05/25   PT Goals Bed Mobility;Transfers;Gait;Stairs   PT: Bed Mobility Supervision/stand-by assist;Supine to/from sit;Within  precautions   PT: Transfers Supervision/stand-by assist;Sit to/from stand;Bed to/from chair;Assistive device;Within precautions   PT: Gait Supervision/stand-by assist;Assistive device;Rolling walker;Within precautions;150 feet   PT: Stairs Minimal assist;Assistive device;Within precautions;2 stairs   Interventions   Interventions Quick Adds Gait Training;Therapeutic Activity   Therapeutic Activity   Therapeutic Activities: dynamic activities to improve functional performance Minutes (97850) 10   Symptoms Noted During/After Treatment Fatigue   Treatment Detail/Skilled Intervention additional sit <> stand x 1 with FWW, CGA, cues for hand placement and to kick RLE forward to prevent inc weight through RLE to maintain TTWB/foot flat. Able to take several small shuffling steps twoards HOB with FWW CGA. Inc time educating pt on technqiue and safety.   Gait Training   Gait Training Minutes (94009) 10   Symptoms Noted During/After Treatment (Gait Training) fatigue;dizziness;increased pain   Treatment Detail/Skilled Intervention additional amb x 135' with FWW, cues for keepign walker on ground and hip precautions especially during turning around. Cues for upright and relaxing shoulders, step to pattern noted, 3 point pattern, shuffling steps. towards end of walk, pt having inc abdominal pain nearly doubling pt in two d/t pain, getting a little dizzy with ambulation, declines need for chair, needing more CGA-Lavon for support.   Distance in Feet 135'   [place] Level (Gait Training) contact guard  (CGA-Lavon)   Physical Assistance Level (Gait Training) supervision;verbal cues   Weight Bearing (Gait Training) touch down weight-bearing   Assistive Device (Gait Training) rolling walker   Pattern Analysis (Gait Training) 3-point gait   Gait Analysis Deviations decreased lou;increased time in double stance;decreased step length;decreased stride length;decreased toe-to-floor clearance   Impairments (Gait Analysis/Training)  balance impaired;pain;strength decreased   PT Discharge Planning   PT Plan amb w/ FWW, stairs x 2 with crutches, touch down/foot flat WB 20#. edu on precuations   PT Discharge Recommendation (DC Rec) home with assist;home with home care physical therapy   PT Rationale for DC Rec home PT if pt wants, otherwise will need outpatient PT   PT Brief overview of current status amb x 150' total with CGA-Lavon, sit <> stand CGA-Lavon, bed mob inc assist for BLEs into bed   PT Total Distance Amb During Session (feet) 150   PT Equipment Needed at Discharge walker, rolling;crutches, axillary   Physical Therapy Time and Intention   Timed Code Treatment Minutes 20   Total Session Time (sum of timed and untimed services) 30

## 2025-04-28 NOTE — PROGRESS NOTES
Occupational Therapy: Orders received, chart reviewed. Spoke with patient and she indicated she does not require skilled OT services at this time due to mod (I) with ADLs/transfers in home within surgical precautions - has all necessary DME.  Will defer to PT for discharge recommendations.  Plan was discussed with PT.  Will complete current OT orders. Thank you.    4/28/2025 by Paige Frommelt, OTR/GONZALO

## 2025-04-28 NOTE — H&P
[facility]    History and Physical - Hospitalist Service       Date of Admission:  4/27/2025    Assessment & Plan      Michelle Isaac is a 70 year old female admitted on 4/27/2025. She has PMH most notable for HLD, hypothyroidism that presents with abdominal pain and has findings concerning for perforated diverticulitis.  She is clinically stable on admission.    Suspected perforated diverticulitis  -CTAP with diverticulitis involving the sigmoid colon with no drainable abscess and mild intraperitoneal free air  -Continue Zosyn  -On admission the patient's abdomen is nondistended, she has generalized tenderness but no involuntary or voluntary guarding, her abdomen does not appear to be acute  -N.p.o.  -Pain control with Tylenol, p.o. Dilaudid, IV Dilaudid   -General Surgery consult    SIRS  The patient is afebrile, mildly tachycardic in the 100s, RR 20, BP 140s/70s and on RA.  WBC 12.2.  Lactic acid 1.4.  This is likely reactive secondary to perforated diverticulitis.  The patient's mentating appropriately and is otherwise stable.    Possible subcutaneous abscess in right lower extremity  -CT notes possible abscess in the subcutaneous fat lateral to the right greater trochanteric region  -On exam there is no purulent drainage and the dressing was clean dry and intact  -Add on CRP  -Start vancomycin  -Orthopedics consult  -Could consider MRI depending on how the patient does over the next few days    HLD  -Continue PTA statin    Hypothyroidism  -Continue PTA Synthroid    Right hip gluteus medius and minimus tendon tearing s/p repair 04/22/2025  -Touch toe weightbearing for 6 weeks per op note (activity order placed)  -Fall precautions  -The patient has a brace which she must wear when ambulating  -s/p the following procedures on 04/22/2025  1.  Right hip open gluteus medius and minimus tendon repair, double row technique  2.  Right hip open iliotibial band lengthening with trochanteric  "bursectomy  3.  Right hip open augmentation of gluteus medius tendon repair with Artelon patch (4x6cm)    3.1 mm pulmonary nodule in the right lower lobe  -Stable since 2017, no follow-up recommended by radiology          Diet: NPO for Procedure/Surgery per Anesthesia Guidelines Except for: Meds; Clear liquids before procedure/surgery: ADULT (Age GREATER than or Equal to 18 years) - Clear liquids 2 hours before procedure/surgery    DVT Prophylaxis: Pneumatic Compression Devices  Luz Catheter: Not present  Lines: None     Cardiac Monitoring: None  Code Status:  Full code,    Clinically Significant Risk Factors Present on Admission                 # Drug Induced Platelet Defect: home medication list includes an antiplatelet medication             # Overweight: Estimated body mass index is 28.22 kg/m  as calculated from the following:    Height as of 4/22/25: 1.638 m (5' 4.5\").    Weight as of 4/22/25: 75.8 kg (167 lb).              Disposition Plan     Medically Ready for Discharge: Anticipated in 2-4 Days           Presley Sorensen MD  Hospitalist Service  [facility]  Securely message with [employer] (more info)  Text page via [facility] Paging/Directory     ______________________________________________________________________    Chief Complaint   Abdominal pain    History is obtained from the patient    History of Present Illness   Michelle Isaac is a 70 year old female who has PMH most notable for HLD, hypothyroidism that presents with abdominal pain.    The patient underwent a surgery to repair her right gluteal tendon on 04/22.  She did not have a bowel movement for several days after this.  She felt acutely constipated on 04/24 and then had severe abdominal pain starting on 04/25.  She was discharged after her surgery with Vicodin and this did not help with her abdominal pain.  She went to an urgent care tonight and was found to have findings concerning for perforated diverticulitis on CT " abdomen pelvis.    No fevers, rigors, night sweats.  No chest pain, dyspnea.  The patient has not fallen in the last several weeks.  She is supposed to use a walker and not put full weight on her right foot.  She occasionally drinks alcohol.  No tobacco or illicit substance use.    In the ER the patient was afebrile, HR 100s, RR 20, /70s and on RA.  Lactic acid 1.4, WBC 12.2.  She received 500 mL normal saline, IV Dilaudid and vancomycin.    Past Medical History    Past Medical History:   Diagnosis Date    Thyroid disease        Past Surgical History   Past Surgical History:   Procedure Laterality Date    ABDUCTOR REPAIR, HIP Right 4/22/2025    Procedure: OPEN RIGHT HIP GLUTEUS MEDIUS TENDON REPAIR,;  Surgeon: Kaleb Issa MD;  Location: [facility]    BURSECTOMY HIP Left 9/13/2024    Procedure: TROCHANTERIC BURSECTOMY;  Surgeon: Kaleb Issa MD;  Location: [facility]    BURSECTOMY HIP Right 4/22/2025    Procedure: TROCHANTERIC BURSECTOMY RIGHT;  Surgeon: Kaleb Issa MD;  Location: [facility]    REPAIR TENDON HAMSTRING Left 9/13/2024    Procedure: OPEN LEFT HIP GLUTEUS MEDIUS TENDON REPAIR WITH ILIOTIBIAL BAND LENGTHENING;  Surgeon: Kaleb Issa MD;  Location: [facility]       Prior to Admission Medications   Prior to Admission Medications   Prescriptions Last Dose Informant Patient Reported? Taking?   Ginkgo Biloba (GINKOBA) 40 MG TABS   Yes No   Sig: Take 40 mg by mouth daily.   Glucosamine Sulfate 500 MG TABS   Yes No   Sig: Take 500 mg by mouth daily.   HYDROcodone-acetaminophen (NORCO) 5-325 MG tablet   No No   Sig: Take 1-2 tablets by mouth every 4 hours as needed for moderate to severe pain.   TURMERIC PO   Yes No   Sig: Take 2 tablets by mouth daily.   Vitamin E 90 MG (200 UNIT) capsule   Yes No   Sig: Take 200 Units by mouth daily.   acetaminophen (TYLENOL) 325 MG tablet   No No   Sig: Take 2 tablets (650 mg) by mouth every 4 hours as needed for mild  "pain.   aspirin 81 MG EC tablet   No No   Sig: Take 1 tablet (81 mg) by mouth 2 times daily.   atorvastatin (LIPITOR) 20 MG tablet   Yes No   Sig: Take 1 tablet by mouth at bedtime.   calcium carbonate (OS-ZACHARIAH) 500 MG tablet   Yes No   Sig: Take 1 tablet by mouth daily.   celecoxib (CELEBREX) 100 MG capsule   No No   Sig: Take 1 capsule (100 mg) by mouth 2 times daily.   fish oil-omega-3 fatty acids 1000 MG capsule   Yes No   Sig: Take 2 g by mouth daily.   levothyroxine (SYNTHROID/LEVOTHROID) 75 MCG tablet   Yes No   Sig: Take 75 mcg by mouth daily.   multivitamin w/minerals (THERA-VIT-M) tablet   Yes No   Sig: Take 1 tablet by mouth daily.   ondansetron (ZOFRAN ODT) 4 MG ODT tab   No No   Sig: Take 1 tablet (4 mg) by mouth every 8 hours as needed for nausea.   senna-docusate (SENOKOT-S/PERICOLACE) 8.6-50 MG tablet   No No   Sig: Take 1-2 tablets by mouth 2 times daily.   vitamin B-12 (CYANOCOBALAMIN) 1000 MCG tablet   Yes No   Sig: Take 1,000 mcg by mouth daily.   vitamin D3 (CHOLECALCIFEROL) 50 mcg (2000 units) tablet   Yes No   Sig: Take 1 tablet by mouth daily.      Facility-Administered Medications: None        Review of Systems    The 10 point Review of Systems is negative other than noted in the HPI or here.     Social History   I have reviewed this patient's social history and updated it with pertinent information if needed.  Social History     Tobacco Use    Smoking status: Never    Smokeless tobacco: Never   Substance Use Topics    Alcohol use: Not Currently    Drug use: Never         Family History     No significant family history, including no history of: Immunodeficiencies      Allergies   Allergies   Allergen Reactions    Ciprofloxacin Hives and Rash     \"Many years ago\"    Oxycodone Other (See Comments)     Does NOT like-makes her feel weird, dizzy, weak        Physical Exam   Vital Signs: Temp: 97  F (36.1  C) Temp src: Temporal BP: (!) 144/78 Pulse: 100   Resp: 20 SpO2: 99 %      Weight: 0 lbs 0 " oz    GENERAL: Lying in bed, no distress, appears stated age   HEENT: NC/AT, sclera anicteric   CV: Mildly tachycardic but regular  PULM: CTAB, no wheezes, rales, rhonchi   GI: Abdomen is soft, nondistended, she has generalized tenderness, no voluntary or voluntary guarding, no pain when I bumped the bed, not an acute abdomen  MSK: WWP, no LE edema, she is wearing a brace from her surgery, the patient's dressing was removed in the presence of her nurse and the wound examined.  There was no purulent drainage or fluctuance/induration noted  NEURO: Awake, alert, oriented to 4/27/2025, CN II-XII grossly intact, SANTOS, appears nonfocal  SKIN: no rash       Medical Decision Making       70 MINUTES SPENT BY ME on the date of service doing chart review, history, exam, documentation & further activities per the note.      Data     I have personally reviewed the following data over the past 24 hrs:    12.2 (H)  \   12.2   / 330     N/A N/A N/A /  N/A   N/A N/A N/A \     Procal: N/A CRP: N/A Lactic Acid: 1.4         Imaging results reviewed over the past 24 hrs:   Recent Results (from the past 24 hours)   CT ABDOMEN PELVIS WO    Narrative    For Patients: As a result of the 21st Century Cures Act, medical imaging exams and procedure reports are released immediately into your electronic medical record. You may view this report before your referring provider. If you have questions, please contact your health care provider.    EXAM: CT ABDOMEN PELVIS WO  LOCATION: [facility]  DATE: 4/27/2025    INDICATION: Lower abdominal pain.  COMPARISON: None.  TECHNIQUE: CT scan of the abdomen and pelvis was performed without IV contrast. Multiplanar reformats were obtained. Dose reduction techniques were used.  CONTRAST: None.    FINDINGS:   LOWER CHEST: Small esophageal hiatal hernia. 3.1 mm noncalcified pulmonary nodule in the right lower lobe. This has been stable since prior CT 11/17/2023 and CT 3/30/2017, thus given  interval stability this should be benign with no follow-up recommended.    HEPATOBILIARY: Normal.    PANCREAS: Normal.    SPLEEN: Normal.    ADRENAL GLANDS: Normal.    KIDNEYS/BLADDER: Normal.    BOWEL: There is a mild amount of free intraperitoneal gas seen with the origin of the gas likely from the distal sigmoid colon which shows some adjacent stranding of the fat, gas, and minimal free appearing fluid. These findings are likely secondary to sequelae of diverticulitis. No drainable abscess identified. Mild diverticulosis most marked in the sigmoid colon. Small esophageal hiatal hernia. The bowel is otherwise normal to include the appendix.    LYMPH NODES: Normal.    VASCULATURE: Mild calcification with no aneurysm.    PELVIC ORGANS: Normal.    MUSCULOSKELETAL: In the subcutaneous fat lateral to the greater trochanteric region of the right hip there is a partially seen gas/fluid collection with the visualized portion measuring approximately 1.6 x 2.1 x 2.8 cm with stranding in the adjacent fat. Underlying changes of inflammation/abscess formation suggested. No destructive changes to suggest osteomyelitis. Presumed prior postoperative changes seen in both hips. Advanced multilevel degenerative changes in the spine.    Impression    1.  Findings worrisome for diverticulitis involving the sigmoid colon with no drainable abscess. There is some associated mild intraperitoneal free gas.    2.  Changes worrisome for an abscess in the subcutaneous fat lateral to the lower right greater trochanteric region.    3.  3.1 mm noncalcified pulmonary nodule in the right lower lobe which has been stable dating back to 3/30/2017, thus should be benign with no follow-up recommended.    4.  The intraperitoneal free gas finding was called to provider Ruben at approximately 9:00 PM central standard time 4/27/2025.   CT External Imaging Abdomen    Narrative    Images were obtained from an external facility.  Click PACS Images    hyperlink to view images.  Textual results have been scanned into the   media tab.

## 2025-04-28 NOTE — ED NOTES
Expected Patient Referral to ED  9:12 PM    Referring Clinic/Provider:  [facility]    Reason for referral/Clinical facts:  69 yo F with recent tendon surgery and a few days of lower abdominal pain prompting her to go to emergency room.  Patient found to have abd free air likely from perf diverculitis.  Patient vital signs have been stable and awaiting metabolic panel.  Patient coming with  and has not received IV antibiotics yet.  Images to be pushed to ER.    Recommendations provided:  Send to ED for further evaluation    Caller was informed that this institution does possess the capabilities and/or resources to provide for patient and should be transferred to our facility.    Discussed that if direct admit is sought and any hurdles are encountered, this ED would be happy to see the patient and evaluate.    Informed caller that recommendations provided are recommendations based only on the facts provided and that they responsible to accept or reject the advice, or to seek a formal in person consultation as needed and that this ED will see/treat patient should they arrive.      Lopez Mederos MD  [facility] EMERGENCY DEPARTMENT  [address] [place] [id number] [phone]       Lopez Mederos MD  04/27/25 [id number]

## 2025-04-28 NOTE — ED NOTES
[facility] ED Handoff Report    ED Chief Complaint: abd pain    ED Diagnosis:  (K57.20) Diverticulitis of large intestine with perforation without abscess, unspecified bleeding status  Comment: abd pain started on Thursday, sharp, intermittent  Plan: antibiotics for diverticulitis       PMH:    Past Medical History:   Diagnosis Date    Thyroid disease         Code Status:  No Order     Falls Risk: Yes Band: Applied    Current Living Situation/Residence: lives with a significant other     Elimination Status: Continent: Yes     Activity Level: 2 assist    Patients Preferred Language:  English     Needed: No    Vital Signs:  BP (!) 144/78   Pulse 100   Temp 97  F (36.1  C) (Temporal)   Resp 20   SpO2 99%        Pain Score: 8/10, prior to dilaudid    Is the Patient Confused:  No    Last Food or Drink: 25 at morning    Focused Assessment:  pt having sharp intermittent RLQ pain.        NAME: Michelle Isaac  AGE: 70 year old female  YOB: 1955  MRN: [id number]  EVALUATION DATE & TIME: No admission date for patient encounter.     PCP: [place]     ED PROVIDER: Lopez Mederos M.D.            Chief Complaint   Patient presents with    Abdominal Pain      FINAL IMPRESSION:  1. Diverticulitis of large intestine with perforation without abscess, unspecified bleeding status       MEDICAL DECISION MAKIN:34 PM I met with the patient, obtained history, performed an initial exam, and discussed options and plan for diagnostics and treatment here in the ED.   10:02 PM Spoke with Dr. Zeng, general surgery.  10:39 PM Reassessed and updated patient with findings.  11:01 PM Spoke with Dr. Sorensen, hospitalist.     Patient was clinically assessed and consented to treatment. After assessment, medical decision making and workup were discussed with the patient. The patient was agreeable to plan for testing, workup, and treatment.  Pertinent Labs & Imaging studies reviewed. (See  chart for details)     Medical Decision Making  I reviewed the EMR: Outpatient Record: Emergency room notes from tonight, 4/27/2025.  Care impacted by recent right hip surgery for tendon release.  I considered additional work up, including CT with contrast, but deferred pending imaging being pushed through for surgery to evaluate the images  I discussed the care with another health care provider: General surgery and hospitalist  Admit.     MIPS (CTPE, Dental pain, Luz, Sinusitis, Asthma/COPD, Head Trauma): Not Applicable     SEPSIS: None     Michelle Isaac is a 70 year old female who presents with abdominal pain.   Differential diagnosis includes but not limited to perforated diverticulitis, abdominal abscess, diverticulitis, sepsis, SIRS.  Patient 70-year-old female who report had been given from emergency room.  Patient had presented there for left lower quadrant abdominal pain with some peritonitic symptoms.  Patient         MEDICATIONS GIVEN IN THE EMERGENCY:  Medications   ondansetron (ZOFRAN) injection 4 mg (has no administration in time range)   HYDROmorphone (PF) (DILAUDID) injection 0.5 mg (has no administration in time range)   sodium chloride 0.9% BOLUS 500 mL (500 mLs Intravenous $New Bag 4/27/25 2993)   piperacillin-tazobactam (ZOSYN) 3.375 g vial to attach to  mL bag (has no administration in time range)         NEW PRESCRIPTIONS STARTED AT TODAY'S ER VISIT:      New Prescriptions     No medications on file            =================================================================     HPI     Patient information was obtained from: Patient and Chart     Use of : N/A         Michelle Isaac is a 70 year old female with a past medical history of hyperglycemia and hypothyroidism, who presents with abdominal pain. The patient states that she recently underwent surgery to repair a right gluteal tendon 5 days ago, and was taking Vicodin due to this. She reports difficulty having a  bowel movement since that time and feels constipated. For the past several days she has had progressive lower abdominal pain which has become severe today. She has tried multiple stool softeners and MiraLAX without success. She denies any fever or vomiting. She denies any urinary symptoms.      Per chart review, patient presents to the urgency room on 04/27/2025 with recent tendon surgery and a few days of lower abdominal pain prompting her to go to emergency room. Patient found to have abd free air likely from perf diverculitis. Patient vital signs have been stable and awaiting metabolic panel. Patient coming with  and has not received IV antibiotics yet. Images to be pushed to ER.        REVIEW OF SYSTEMS   Refer to HPI.       PAST MEDICAL HISTORY:  Past Medical History        Past Medical History:   Diagnosis Date    Thyroid disease              PAST SURGICAL HISTORY:  Past Surgical History         Past Surgical History:   Procedure Laterality Date    ABDUCTOR REPAIR, HIP Right 4/22/2025     Procedure: OPEN RIGHT HIP GLUTEUS MEDIUS TENDON REPAIR,;  Surgeon: Kaleb Issa MD;  Location: [facility]    BURSECTOMY HIP Left 9/13/2024     Procedure: TROCHANTERIC BURSECTOMY;  Surgeon: Kaleb Issa MD;  Location: [facility]    BURSECTOMY HIP Right 4/22/2025     Procedure: TROCHANTERIC BURSECTOMY RIGHT;  Surgeon: Kaleb Issa MD;  Location: [facility]    REPAIR TENDON HAMSTRING Left 9/13/2024     Procedure: OPEN LEFT HIP GLUTEUS MEDIUS TENDON REPAIR WITH ILIOTIBIAL BAND LENGTHENING;  Surgeon: Kaleb Issa MD;  Location: [facility]            CURRENT MEDICATIONS:      Current Medications      Current Facility-Administered Medications:     HYDROmorphone (PF) (DILAUDID) injection 0.5 mg, 0.5 mg, Intravenous, Q30 Min PRN, Lopez Mdeeros MD    ondansetron (ZOFRAN) injection 4 mg, 4 mg, Intravenous, Q30 Min PRN, Lopez Mederos MD     piperacillin-tazobactam (ZOSYN) 3.375 g vial to attach to  mL bag, 3.375 g, Intravenous, Once, Lopez Mederos MD    sodium chloride 0.9% BOLUS 500 mL, 500 mL, Intravenous, Once, Lopez Mederos MD, Last Rate: 500 mL/hr at 04/27/25 2248, 500 mL at 04/27/25 2248     Current Outpatient Medications:     acetaminophen (TYLENOL) 325 MG tablet, Take 2 tablets (650 mg) by mouth every 4 hours as needed for mild pain., Disp: 50 tablet, Rfl: 0    aspirin 81 MG EC tablet, Take 1 tablet (81 mg) by mouth 2 times daily., Disp: 60 tablet, Rfl: 0    atorvastatin (LIPITOR) 20 MG tablet, Take 1 tablet by mouth at bedtime., Disp: , Rfl:     calcium carbonate (OS-ZACHARIAH) 500 MG tablet, Take 1 tablet by mouth daily., Disp: , Rfl:     celecoxib (CELEBREX) 100 MG capsule, Take 1 capsule (100 mg) by mouth 2 times daily., Disp: 30 capsule, Rfl: 0    fish oil-omega-3 fatty acids 1000 MG capsule, Take 2 g by mouth daily., Disp: , Rfl:     Ginkgo Biloba (GINKOBA) 40 MG TABS, Take 40 mg by mouth daily., Disp: , Rfl:     Glucosamine Sulfate 500 MG TABS, Take 500 mg by mouth daily., Disp: , Rfl:     HYDROcodone-acetaminophen (NORCO) 5-325 MG tablet, Take 1-2 tablets by mouth every 4 hours as needed for moderate to severe pain., Disp: 30 tablet, Rfl: 0    levothyroxine (SYNTHROID/LEVOTHROID) 75 MCG tablet, Take 75 mcg by mouth daily., Disp: , Rfl:     multivitamin w/minerals (THERA-VIT-M) tablet, Take 1 tablet by mouth daily., Disp: , Rfl:     ondansetron (ZOFRAN ODT) 4 MG ODT tab, Take 1 tablet (4 mg) by mouth every 8 hours as needed for nausea., Disp: 4 tablet, Rfl: 0    senna-docusate (SENOKOT-S/PERICOLACE) 8.6-50 MG tablet, Take 1-2 tablets by mouth 2 times daily., Disp: 30 tablet, Rfl: 0    TURMERIC PO, Take 2 tablets by mouth daily., Disp: , Rfl:     vitamin B-12 (CYANOCOBALAMIN) 1000 MCG tablet, Take 1,000 mcg by mouth daily., Disp: , Rfl:     vitamin D3 (CHOLECALCIFEROL) 50 mcg (2000 units) tablet, Take 1 tablet  "by mouth daily., Disp: , Rfl:     Vitamin E 90 MG (200 UNIT) capsule, Take 200 Units by mouth daily., Disp: , Rfl:         ALLERGIES:  Allergies         Allergies   Allergen Reactions    Ciprofloxacin Hives and Rash       \"Many years ago\"    Oxycodone Other (See Comments)       Does NOT like-makes her feel weird, dizzy, weak            FAMILY HISTORY:  Family History   History reviewed. No pertinent family history.        SOCIAL HISTORY:   Social History           Socioeconomic History    Marital status:    Tobacco Use    Smoking status: Never    Smokeless tobacco: Never   Substance and Sexual Activity    Alcohol use: Not Currently    Drug use: Never      Social Drivers of Health           Financial Resource Strain: Low Risk  (9/13/2024)     Financial Resource Strain      Within the past 12 months, have you or your family members you live with been unable to get utilities (heat, electricity) when it was really needed?: No   Food Insecurity: Low Risk  (9/13/2024)     Food Insecurity      Within the past 12 months, did you worry that your food would run out before you got money to buy more?: No      Within the past 12 months, did the food you bought just not last and you didn t have money to get more?: No   Transportation Needs: Low Risk  (9/13/2024)     Transportation Needs      Within the past 12 months, has lack of transportation kept you from medical appointments, getting your medicines, non-medical meetings or appointments, work, or from getting things that you need?: No   Physical Activity: Insufficiently Active (3/16/2024)     Received from [facility]     Exercise Vital Sign      Days of Exercise per Week: 3 days      Minutes of Exercise per Session: 30 min   Stress: No Stress Concern Present (1/9/2022)     Received from [facility]     [employer] - Occupational Stress Questionnaire      Feeling of Stress : Not at all   Social Connections: Socially Integrated (9/5/2024)     " Received from [facility]     Social Connections      Do you often feel lonely or isolated from those around you?: 0   Interpersonal Safety: Low Risk  (4/22/2025)     Interpersonal Safety      Do you feel physically and emotionally safe where you currently live?: Yes      Within the past 12 months, have you been hit, slapped, kicked or otherwise physically hurt by someone?: No      Within the past 12 months, have you been humiliated or emotionally abused in other ways by your partner or ex-partner?: No   Housing Stability: Low Risk  (9/13/2024)     Housing Stability      Do you have housing? : Yes      Are you worried about losing your housing?: No         PHYSICAL EXAM:    Vitals: BP (!) 144/78   Pulse 100   Temp 97  F (36.1  C) (Temporal)   Resp 20   SpO2 99%    Physical Exam      LAB:  All pertinent labs reviewed and interpreted.        Labs Ordered and Resulted from Time of ED Arrival to Time of ED Departure   CBC WITH PLATELETS AND DIFFERENTIAL - Abnormal       Result Value      WBC Count 12.2 (*)       RBC Count 4.30        Hemoglobin 12.2        Hematocrit 37.3        MCV 87        MCH 28.4        MCHC 32.7        RDW 12.9        Platelet Count 330        % Neutrophils 84        % Lymphocytes 10        % Monocytes 6        % Eosinophils 0        % Basophils 0        % Immature Granulocytes 1        NRBCs per 100 WBC 0        Absolute Neutrophils 10.2 (*)       Absolute Lymphocytes 1.2        Absolute Monocytes 0.7        Absolute Eosinophils 0.0        Absolute Basophils 0.0        Absolute Immature Granulocytes 0.1        Absolute NRBCs 0.0      LACTIC ACID WHOLE BLOOD WITH 1X REPEAT IN 2 HR WHEN >2 - Normal     Lactic Acid, Initial 1.4      INR   PARTIAL THROMBOPLASTIN TIME   TYPE AND SCREEN, ADULT     SPECIMEN EXPIRATION DATE [id number]      ABO/RH TYPE AND SCREEN         RADIOLOGY:  CT External Imaging Abdomen   Final Result         I have independently reviewed  and interpreted the EKG(s) documented above.      PROCEDURES:   Procedures         I, Latonya Grubbs, am serving as a scribe to document services personally performed by Dr. Lopez Mederos  based on my observation and the provider's statements to me. I, Lopez Mederos MD attest that Latonya Grubbs is acting in a scribe capacity, has observed my performance of the services and has documented them in accordance with my direction.        Lopez Mederos M.D.  Emergency Medicine  [facility] Emergency Department    Tests Performed: Done: Labs and Imaging    Treatments Provided:  zosyn, dilaudid, zofran    Family Dynamics/Concerns: No    Family Updated On Visitor Policy: Yes    Plan of Care Communicated to Family: Yes    Who Was Updated about Plan of Care: pt and     Belongings Checklist Done and Signed by Patient: Yes    Belongings Sent with Patient: yes    Medications sent with patient: none    Covid: asymptomatic , not tested    Additional Information: pt recently had gluteus surgery last week, discharged on wednesday    RN: Shelby Gross RN   4/27/2025 11:29 PM

## 2025-04-28 NOTE — PROGRESS NOTES
"[facility]    Medicine Progress Note - Hospitalist Service    Date of Admission:  4/27/2025    Assessment & Plan   70-year-old female with recent surgery to RLE presented with abdominal pain and found to have perforated diverticulitis.    #Sigmoid colon diverticulitis with perforation  #Sepsis  General Surgery consulted: No plan for surgical intervention unless clinical status decompensates  N.p.o. with ice chips, bowel rest  Zosyn  IV fluids  Pain control    #Recent RLE tendon repair and trochanteric bursectomy 4/22/25  CT shows fluid collection  Discussed with Ortho PA, not concerned for abscess  Stop Vancomycin  Ortho will continue following    #Acute anemia  Likely related to recent surgery and sepsis  Monitor CBC      DVT ppx: PCDs        Diet: NPO for Medical/Clinical Reasons Except for: Meds, Ice Chips    Luz Catheter: Not present  Lines: None     Cardiac Monitoring: ACTIVE order. Indication: QTc prolonging medication (48 hours)  Code Status: Full Code      Clinically Significant Risk Factors Present on Admission           # Hypocalcemia: Lowest Ca = 8.2 mg/dL in last 2 days, will monitor and replace as appropriate     # Hypoalbuminemia: Lowest albumin = 3.3 g/dL at 4/28/2025  6:14 AM, will monitor as appropriate  # Coagulation Defect: INR = 1.16 (Ref range: 0.85 - 1.15) and/or PTT = 31 Seconds (Ref range: 22 - 38 Seconds), will monitor for bleeding  # Drug Induced Platelet Defect: home medication list includes an antiplatelet medication        # Anemia: based on hgb <11       # Overweight: Estimated body mass index is 28.39 kg/m  as calculated from the following:    Height as of 4/22/25: 1.638 m (5' 4.5\").    Weight as of this encounter: 76.2 kg (167 lb 15.9 oz).              Social Drivers of Health    Housing Stability: High Risk (4/28/2025)    Housing Stability     Do you have housing? : No     Are you worried about losing your housing?: No   Physical Activity: Insufficiently " Active (3/16/2024)    Received from [facility]    Exercise Vital Sign     Days of Exercise per Week: 3 days     Minutes of Exercise per Session: 30 min          Disposition Plan     Medically Ready for Discharge: Anticipated in 2-4 Days             Chava Henao DO  Hospitalist Service  [facility]  Securely message with [employer] (more info)  Text page via [employer] Paging/Directory   ______________________________________________________________________    Interval History   Soreness in abdomen improved with rest. No F, N, V    Physical Exam   Vital Signs: Temp: 98.3  F (36.8  C) Temp src: Oral BP: 120/58 Pulse: 81   Resp: 16 SpO2: 96 % O2 Device: None (Room air)    Weight: 167 lbs 15.85 oz    General Appearance:  No acute distress  Respiratory: Clear to auscultation bilaterally  Cardiovascular: Regular rate and rhythm  GI: Hypoactive bowel sounds, abdomen is soft, nondistended, tender to palpation without rebound    Medical Decision Making       50 MINUTES SPENT BY ME on the date of service doing chart review, history, exam, documentation & further activities per the note.      Data

## 2025-04-28 NOTE — CONSULTS
ORTHOPEDIC CONSULTATION    Consultation  Michelle Isaac,  1955, MRN [id number]    Diverticulitis of large intestine with perforation without abscess, unspecified bleeding status [K57.20]    PCP: [place], [phone]   Code status:  Full Code       Extended Emergency Contact Information  Primary Emergency Contact: donaldo isaac  Home Phone: [phone]  Mobile Phone: [phone]  Relation: Spouse         IMPRESSION:  70-year-old female status post right gluteal tendon repair on 2025 with Dr. Issa, admitted on 2025 for diverticulitis with concern for perforation, imaging incidentally noted fluid around the right hip greater trochanter this would be normal postoperative finding and not concerning for infection/abscess at this time     PLAN:  This patient was discussed with Dr. Issa, on-call surgeon for [facility] and they are in agreement with the following plan.   - No indication for urgent surgical intervention from an orthopedic standpoint.  - Appreciate general surgery recommendations regarding perforated diverticulitis  - No need for antibiotics from an orthopedic standpoint.  Per gen surg, continue IV antibiotics  - Brace at all times.  Brace is not fitting properly after was removed for her CT scan, orthotics consulted for brace adjustment  -Flatfoot weightbearing to the right lower extremity, crutches to be used when ambulating  - No active hip abduction and internal rotation.  No passive hip external rotation and abduction  -Dressing clean, dry, and intact until postop visit  -Follow-up with Dr. Issa following discharge  - PT/OT  - Will continue to follow during this admission    Thank you for including [facility] in the care of Michelle Isaac. It has been a pleasure participating in their care.        CHIEF COMPLAINT: <principal problem not specified>    HISTORY OF PRESENT ILLNESS:  The patient is seen in orthopedic consultation at the request  "of Chava Henao DO for right hip fluid collection.  The patient is a 70 year old female    Today patient is experiencing some ongoing soreness from her right hip.  She recently underwent a right hip gluteal tendon repair with Dr. Issa on 4/22/2025 at [facility].  She was discharged day of surgery.  She has been doing well since her surgery except for some constipation that was developing as the week went on which progressed to severe abdominal pain.  She went into the urgent care and had a CT scan done that demonstrated diverticulitis with free air suspected perforation and was sent to the emergency department for further evaluation.  At this time general surgery is not planning on imminent surgical intervention for her but is closely monitoring.  She is not developing any worsening symptoms regarding her right hip.  Overall pain is fairly well-controlled.  She has been using her brace at home and has been weightbearing and ambulating to get to the bathroom.  Denies Fever/chills today.    ALLERGIES:   Review of patient's allergies indicates   Allergies   Allergen Reactions    Ciprofloxacin Hives and Rash     \"Many years ago\"    Oxycodone Other (See Comments)     Does NOT like-makes her feel weird, dizzy, weak         MEDICATIONS UPON ADMISSION:  Medications were reviewed.  They include:   Medications Prior to Admission   Medication Sig Dispense Refill Last Dose/Taking    acetaminophen (ARTHRITIS PAIN APAP) 650 MG CR tablet Take 1,300 mg by mouth 3 times daily as needed for mild pain or fever.   Taking As Needed    aspirin 81 MG EC tablet Take 1 tablet (81 mg) by mouth 2 times daily. 60 tablet 0 4/27/2025 Morning    atorvastatin (LIPITOR) 20 MG tablet Take 1 tablet by mouth at bedtime.   4/26/2025 Bedtime    calcium carbonate (OS-ZACHARIAH) 500 MG tablet Take 1 tablet by mouth daily.   4/27/2025 Morning    celecoxib (CELEBREX) 100 MG capsule Take 1 capsule (100 mg) by mouth 2 times daily. 30 capsule 0 " 4/27/2025 Morning    fish oil-omega-3 fatty acids 1000 MG capsule Take 2 g by mouth daily.   4/27/2025 Morning    Ginkgo Biloba (GINKOBA) 40 MG TABS Take 40 mg by mouth daily.   4/27/2025 Morning    Glucosamine Sulfate 500 MG TABS Take 500 mg by mouth daily.   4/27/2025 Morning    HYDROcodone-acetaminophen (NORCO) 5-325 MG tablet Take 1-2 tablets by mouth every 4 hours as needed for moderate to severe pain. 30 tablet 0 Taking As Needed    levothyroxine (SYNTHROID/LEVOTHROID) 75 MCG tablet Take 75 mcg by mouth daily.   4/27/2025 Morning    multivitamin w/minerals (THERA-VIT-M) tablet Take 1 tablet by mouth daily.   4/27/2025 Morning    ondansetron (ZOFRAN ODT) 4 MG ODT tab Take 1 tablet (4 mg) by mouth every 8 hours as needed for nausea. 4 tablet 0 Taking As Needed    senna-docusate (SENOKOT-S/PERICOLACE) 8.6-50 MG tablet Take 1-2 tablets by mouth 2 times daily. 30 tablet 0 4/27/2025 Morning    TURMERIC PO Take 2 tablets by mouth daily.   4/27/2025 Morning    vitamin B-12 (CYANOCOBALAMIN) 1000 MCG tablet Take 1,000 mcg by mouth daily.   4/27/2025 Morning    vitamin D3 (CHOLECALCIFEROL) 50 mcg (2000 units) tablet Take 1 tablet by mouth daily.   4/27/2025 Morning    Vitamin E 90 MG (200 UNIT) capsule Take 200 Units by mouth daily.   4/27/2025 Morning         SOCIAL HISTORY:   she  reports that she has never smoked. She has never used smokeless tobacco. She reports that she does not currently use alcohol. She reports that she does not use drugs.      FAMILY HISTORY:  family history is not on file.      REVIEW OF SYSTEMS:   Reviewed with patient. See HPI, otherwise negative       PHYSICAL EXAMINATION:  Vitals: /58 (BP Location: Right arm)   Pulse 81   Temp 98.3  F (36.8  C) (Oral)   Resp 16   Wt 76.2 kg (167 lb 15.9 oz)   SpO2 96%   BMI 28.39 kg/m    General: On examination, the patient is resting comfortably, NAD, awake, and alert and oriented to person, place, time, and, and general circumstances   SKIN:    Dressing on the right hip was removed, incision is well-approximated and healing as expected.  No active bleeding or drainage.  Minimal surrounding erythema, no signs of abscess in the area.  Expected swelling and ecchymosis postoperatively.  Pulses:  Dorsalis pedis and posterior tibialis pulse is intact and equal bilaterally  Sensation: intact and equal bilaterally to the distal lower extremities.  Tenderness: There is no tenderness about her right hip or around the incision.  No palpable fluctuance  ROM: DF/PF intact, wiggles toes    Contralateral side= Full range of motion, Negative joint instability findings, 5/5 motor groups about the joint, Non-tender.       RADIOGRAPHIC EVALUATION:  Personally reviewed  Impression    1.  Findings worrisome for diverticulitis involving the sigmoid colon with no drainable abscess. There is some associated mild intraperitoneal free gas.    2.  Changes worrisome for an abscess in the subcutaneous fat lateral to the lower right greater trochanteric region.    3.  3.1 mm noncalcified pulmonary nodule in the right lower lobe which has been stable dating back to 3/30/2017, thus should be benign with no follow-up recommended.    4.  The intraperitoneal free gas finding was called to provider Ruben at approximately 9:00 PM central standard time 4/27/2025.  Narrative    For Patients: As a result of the 21st Century Cures Act, medical imaging exams and procedure reports are released immediately into your electronic medical record. You may view this report before your referring provider. If you have questions, please contact your health care provider.    EXAM: CT ABDOMEN PELVIS WO  LOCATION: [facility] [place]  DATE: 4/27/2025    INDICATION: Lower abdominal pain.  COMPARISON: None.  TECHNIQUE: CT scan of the abdomen and pelvis was performed without IV contrast. Multiplanar reformats were obtained. Dose reduction techniques were used.  CONTRAST: None.    FINDINGS:  LOWER  CHEST: Small esophageal hiatal hernia. 3.1 mm noncalcified pulmonary nodule in the right lower lobe. This has been stable since prior CT 11/17/2023 and CT 3/30/2017, thus given interval stability this should be benign with no follow-up recommended.    HEPATOBILIARY: Normal.    PANCREAS: Normal.    SPLEEN: Normal.    ADRENAL GLANDS: Normal.    KIDNEYS/BLADDER: Normal.    BOWEL: There is a mild amount of free intraperitoneal gas seen with the origin of the gas likely from the distal sigmoid colon which shows some adjacent stranding of the fat, gas, and minimal free appearing fluid. These findings are likely secondary to sequelae of diverticulitis. No drainable abscess identified. Mild diverticulosis most marked in the sigmoid colon. Small esophageal hiatal hernia. The bowel is otherwise normal to include the appendix.    LYMPH NODES: Normal.    VASCULATURE: Mild calcification with no aneurysm.    PELVIC ORGANS: Normal.    MUSCULOSKELETAL: In the subcutaneous fat lateral to the greater trochanteric region of the right hip there is a partially seen gas/fluid collection with the visualized portion measuring approximately 1.6 x 2.1 x 2.8 cm with stranding in the adjacent fat. Underlying changes of inflammation/abscess formation suggested. No destructive changes to suggest osteomyelitis. Presumed prior postoperative changes seen in both hips. Advanced multilevel degenerative changes in the spine.    PERTINENT LABS:  Personally reviewed  Recent Labs   Lab Test 04/28/25  0614   INR 1.16*   HGB 10.0*            CAROLE ESPINAL PA-C  Date: 4/28/2025  Time: 10:43 AM  [facility]    CC1:   Chava Henao DO

## 2025-04-28 NOTE — CONSULTS
General Surgery Consult    Michelle Isaac MRN# [id number]     Date of Admission: 4/27/2025    Reason for Consult  Perforated diverticulitis    History of Present Illness  Patient is a generally healthy 70-year-old woman who presents to the emergency department with abdominal pain is been ongoing for several days.  She is recently status post repair of a right gluteal tendon that was done on 4/22/2025.  She did not have a bowel movement and was feeling bloated and constipated.  She attempted multiple over-the-counter medications to induce bowel movement.  Her pain acutely worsened 2 days ago.  She took a Vicodin at that time which did not particularly help.  She decided to go to urgent care thinking she may need some more aggressive bowel regimen.  CT scan performed that showed some mild pneumoperitoneum likely from a perforated diverticulitis to the distal sigmoid colon.  She had a colonoscopy about a year ago which she said was normal.  She has not heard any mention of diverticular disease.  She tells me today that she probably feels a little better than when she initially presented.  She remains quite sore in her abdomen.    Past Medical History:  Past Medical History:   Diagnosis Date    Thyroid disease        Past Surgical History:  Past Surgical History:   Procedure Laterality Date    ABDUCTOR REPAIR, HIP Right 4/22/2025    Procedure: OPEN RIGHT HIP GLUTEUS MEDIUS TENDON REPAIR,;  Surgeon: Kaleb Issa MD;  Location: [facility]    BURSECTOMY HIP Left 9/13/2024    Procedure: TROCHANTERIC BURSECTOMY;  Surgeon: Kaleb Issa MD;  Location: [facility]    BURSECTOMY HIP Right 4/22/2025    Procedure: TROCHANTERIC BURSECTOMY RIGHT;  Surgeon: Kaleb Issa MD;  Location: [facility]    REPAIR TENDON HAMSTRING Left 9/13/2024    Procedure: OPEN LEFT HIP GLUTEUS MEDIUS TENDON REPAIR WITH ILIOTIBIAL BAND LENGTHENING;  Surgeon: Kaleb Issa MD;  Location: [facility]  "      Allergies:     Allergies   Allergen Reactions    Ciprofloxacin Hives and Rash     \"Many years ago\"    Oxycodone Other (See Comments)     Does NOT like-makes her feel weird, dizzy, weak     Medications:  Current Facility-Administered Medications   Medication Dose Route Frequency Provider Last Rate Last Admin    acetaminophen (TYLENOL) tablet 650 mg  650 mg Oral Q4H PRN Prseley Sorensen MD   650 mg at 04/28/25 0118    Or    acetaminophen (TYLENOL) Suppository 650 mg  650 mg Rectal Q4H PRN Presley Sorensen MD        atorvastatin (LIPITOR) tablet 20 mg  20 mg Oral At Bedtime Presley Sorensen MD   20 mg at 04/28/25 0118    calcium carbonate (TUMS) chewable tablet 1,000 mg  1,000 mg Oral 4x Daily PRN Presley Sorensen MD        HYDROmorphone (DILAUDID) half-tab 1 mg  1 mg Oral Q4H PRN Presley Sorensen MD        HYDROmorphone (DILAUDID) injection 0.2 mg  0.2 mg Intravenous Q2H PRN Presley Sorensen MD        HYDROmorphone (DILAUDID) injection 0.4 mg  0.4 mg Intravenous Q2H PRN Presley Sorensen MD        HYDROmorphone (DILAUDID) tablet 2 mg  2 mg Oral Q4H PRN Presley Sorensen MD        levothyroxine (SYNTHROID/LEVOTHROID) tablet 75 mcg  75 mcg Oral Daily Presley Sorensen MD   75 mcg at 04/28/25 0902    lidocaine (LMX4) cream   Topical Q1H PRN Presley Sorensen MD        lidocaine 1 % 0.1-1 mL  0.1-1 mL Other Q1H PRN Presley Sorensen MD        naloxone (NARCAN) injection 0.2 mg  0.2 mg Intravenous Q2 Min PRN Presley Sorensen MD        Or    naloxone (NARCAN) injection 0.4 mg  0.4 mg Intravenous Q2 Min PRN Presley Sorensen MD        Or    naloxone (NARCAN) injection 0.2 mg  0.2 mg Intramuscular Q2 Min PRN Presley Sorensen MD        Or    naloxone (NARCAN) injection 0.4 mg  0.4 mg Intramuscular Q2 Min PRN Presley Sorensen MD        ondansetron (ZOFRAN ODT) ODT tab 4 mg  4 mg Oral Q6H PRN Presley Sorensen MD        Or    ondansetron (ZOFRAN) " injection 4 mg  4 mg Intravenous Q6H PRN Presley Sorensen MD        piperacillin-tazobactam (ZOSYN) 3.375 g vial to attach to  mL bag  3.375 g Intravenous Q6H Presley Sorensen MD   3.375 g at 04/28/25 0540    prochlorperazine (COMPAZINE) injection 5 mg  5 mg Intravenous Q6H PRN Presley Sorensen MD        Or    prochlorperazine (COMPAZINE) tablet 5 mg  5 mg Oral Q6H PRN Presley Sorensen MD        senna-docusate (SENOKOT-S/PERICOLACE) 8.6-50 MG per tablet 1 tablet  1 tablet Oral BID PRN Presley Sorensen MD        Or    senna-docusate (SENOKOT-S/PERICOLACE) 8.6-50 MG per tablet 2 tablet  2 tablet Oral BID PRN Presley Sorensen MD        sodium chloride (PF) 0.9% PF flush 3 mL  3 mL Intracatheter Q8H REED Presley Sorensen MD        sodium chloride (PF) 0.9% PF flush 3 mL  3 mL Intracatheter q1 min prn Presley Sorensen MD   3 mL at 04/28/25 0119    sodium chloride 0.9 % infusion   Intravenous Continuous Presley Sorensen  mL/hr at 04/28/25 0126 New Bag at 04/28/25 0126    vancomycin (VANCOCIN) 1,250 mg in 0.9% NaCl 262.5 mL intermittent infusion  1,250 mg Intravenous Q18H Presley Sorensen MD         Social History:  Social History     Socioeconomic History    Marital status:      Spouse name: Not on file    Number of children: Not on file    Years of education: Not on file    Highest education level: Not on file   Occupational History    Not on file   Tobacco Use    Smoking status: Never    Smokeless tobacco: Never   Substance and Sexual Activity    Alcohol use: Not Currently    Drug use: Never    Sexual activity: Not on file   Other Topics Concern    Not on file   Social History Narrative    Not on file     Social Drivers of Health     Financial Resource Strain: Low Risk  (4/28/2025)    Financial Resource Strain     Within the past 12 months, have you or your family members you live with been unable to get utilities (heat, electricity) when it was really  needed?: No   Food Insecurity: Low Risk  (4/28/2025)    Food Insecurity     Within the past 12 months, did you worry that your food would run out before you got money to buy more?: No     Within the past 12 months, did the food you bought just not last and you didn t have money to get more?: No   Transportation Needs: Low Risk  (4/28/2025)    Transportation Needs     Within the past 12 months, has lack of transportation kept you from medical appointments, getting your medicines, non-medical meetings or appointments, work, or from getting things that you need?: No   Physical Activity: Insufficiently Active (3/16/2024)    Received from [facility]    Exercise Vital Sign     Days of Exercise per Week: 3 days     Minutes of Exercise per Session: 30 min   Stress: No Stress Concern Present (1/9/2022)    Received from [facility]    [employer] - Occupational Stress Questionnaire     Feeling of Stress : Not at all   Social Connections: Socially Integrated (9/5/2024)    Received from [facility]    Social Connections     Do you often feel lonely or isolated from those around you?: 0   Interpersonal Safety: Low Risk  (4/28/2025)    Interpersonal Safety     Do you feel physically and emotionally safe where you currently live?: Yes     Within the past 12 months, have you been hit, slapped, kicked or otherwise physically hurt by someone?: No     Within the past 12 months, have you been humiliated or emotionally abused in other ways by your partner or ex-partner?: No   Housing Stability: High Risk (4/28/2025)    Housing Stability     Do you have housing? : No     Are you worried about losing your housing?: No     Family History:  History reviewed. No pertinent family history.    ROS:  12 point review negative except as stated in H&P    Exam:  /58 (BP Location: Right arm)   Pulse 81   Temp 98.3  F (36.8  C) (Oral)   Resp 16   Wt 76.2 kg (167 lb 15.9 oz)    SpO2 96%   BMI 28.39 kg/m    General: Laying in bed and looks generally quite comfortable while at rest.  Resp: Non-labored breathing on room air  Cardiac: RRR  Abdomen: Soft with some distention.  Clear rebound tenderness in definitely focal peritonitis in the lower abdomen.    Labs:   Personally reviewed  WBC 10.7 this morning down from 12.2  Mild transaminitis today  Creatinine 0.7  Lactate was 1.4 last evening    Imaging:   Personally reviewed  CT shows mild to moderate amount of pneumoperitoneum with seeming focus near the distal sigmoid colon.  There is no clear abscesses.  There is no significant fluid collections.    Assessment: 70 year old female presenting with pneumoperitoneum from a likely perforated diverticulitis.  She definitely is concerning to me.  Her abdominal exam is fairly concerning and her CT scan has more air than we typically see for this process.  However, subjectively her pain is better today, her heart rate has improved significantly, her white blood cell count is normalized, and she generally looks well at rest.    We discussed the pathophysiology of diverticulitis and the role of surgery.  I explained that we try to avoid operation in the acute setting if at all possible as this often ends in a colostomy.  I do think we can safely watch her for now.  If her clinical progress stalls or she gets worse, she would need at least a diagnostic laparoscopy and washout and potentially sigmoid colectomy and Flora procedure.  This was all explained in detail and the patient understands.    Plan:   -Continue IV antibiotics  - Bowel rest.  N.p.o. with ice chips only  - Serial exams by surgery team  - Please alert the team if her clinical condition worsens    Zohaib Velez MD  General Surgeon  [facility]  Surgery [facility] - [address]  [address]  [place] [address]  Office: [phone]

## 2025-04-29 ENCOUNTER — APPOINTMENT (OUTPATIENT)
Dept: PHYSICAL THERAPY | Facility: HOSPITAL | Age: 70
End: 2025-04-29
Payer: MEDICARE

## 2025-04-29 LAB
ANION GAP SERPL CALCULATED.3IONS-SCNC: 9 MMOL/L (ref 7–15)
BASOPHILS # BLD AUTO: 0 10E3/UL (ref 0–0.2)
BASOPHILS NFR BLD AUTO: 0 %
BUN SERPL-MCNC: 13.1 MG/DL (ref 8–23)
CALCIUM SERPL-MCNC: 8.5 MG/DL (ref 8.8–10.4)
CHLORIDE SERPL-SCNC: 106 MMOL/L (ref 98–107)
CREAT SERPL-MCNC: 0.94 MG/DL (ref 0.51–0.95)
EGFRCR SERPLBLD CKD-EPI 2021: 64 ML/MIN/1.73M2
EOSINOPHIL # BLD AUTO: 0.1 10E3/UL (ref 0–0.7)
EOSINOPHIL NFR BLD AUTO: 1 %
ERYTHROCYTE [DISTWIDTH] IN BLOOD BY AUTOMATED COUNT: 13.1 % (ref 10–15)
GLUCOSE SERPL-MCNC: 103 MG/DL (ref 70–99)
HCO3 SERPL-SCNC: 26 MMOL/L (ref 22–29)
HCT VFR BLD AUTO: 33.2 % (ref 35–47)
HGB BLD-MCNC: 10.2 G/DL (ref 11.7–15.7)
IMM GRANULOCYTES # BLD: 0.1 10E3/UL
IMM GRANULOCYTES NFR BLD: 1 %
LYMPHOCYTES # BLD AUTO: 1.4 10E3/UL (ref 0.8–5.3)
LYMPHOCYTES NFR BLD AUTO: 13 %
MCH RBC QN AUTO: 28.1 PG (ref 26.5–33)
MCHC RBC AUTO-ENTMCNC: 30.7 G/DL (ref 31.5–36.5)
MCV RBC AUTO: 92 FL (ref 78–100)
MONOCYTES # BLD AUTO: 0.8 10E3/UL (ref 0–1.3)
MONOCYTES NFR BLD AUTO: 7 %
NEUTROPHILS # BLD AUTO: 8.4 10E3/UL (ref 1.6–8.3)
NEUTROPHILS NFR BLD AUTO: 78 %
NRBC # BLD AUTO: 0 10E3/UL
NRBC BLD AUTO-RTO: 0 /100
PLATELET # BLD AUTO: 296 10E3/UL (ref 150–450)
POTASSIUM SERPL-SCNC: 3.3 MMOL/L (ref 3.4–5.3)
RBC # BLD AUTO: 3.63 10E6/UL (ref 3.8–5.2)
SODIUM SERPL-SCNC: 141 MMOL/L (ref 135–145)
WBC # BLD AUTO: 10.8 10E3/UL (ref 4–11)

## 2025-04-29 PROCEDURE — 80048 BASIC METABOLIC PNL TOTAL CA: CPT | Performed by: HOSPITALIST

## 2025-04-29 PROCEDURE — 36415 COLL VENOUS BLD VENIPUNCTURE: CPT | Performed by: HOSPITALIST

## 2025-04-29 PROCEDURE — 250N000013 HC RX MED GY IP 250 OP 250 PS 637: Performed by: INTERNAL MEDICINE

## 2025-04-29 PROCEDURE — 99231 SBSQ HOSP IP/OBS SF/LOW 25: CPT | Performed by: SURGERY

## 2025-04-29 PROCEDURE — 258N000003 HC RX IP 258 OP 636: Performed by: INTERNAL MEDICINE

## 2025-04-29 PROCEDURE — 250N000013 HC RX MED GY IP 250 OP 250 PS 637: Performed by: HOSPITALIST

## 2025-04-29 PROCEDURE — 120N000001 HC R&B MED SURG/OB

## 2025-04-29 PROCEDURE — 99232 SBSQ HOSP IP/OBS MODERATE 35: CPT | Performed by: HOSPITALIST

## 2025-04-29 PROCEDURE — 85025 COMPLETE CBC W/AUTO DIFF WBC: CPT | Performed by: HOSPITALIST

## 2025-04-29 PROCEDURE — 97530 THERAPEUTIC ACTIVITIES: CPT | Mod: GP

## 2025-04-29 PROCEDURE — 250N000011 HC RX IP 250 OP 636: Performed by: INTERNAL MEDICINE

## 2025-04-29 PROCEDURE — 97116 GAIT TRAINING THERAPY: CPT | Mod: GP

## 2025-04-29 RX ORDER — POTASSIUM CHLORIDE 1500 MG/1
40 TABLET, EXTENDED RELEASE ORAL ONCE
Status: COMPLETED | OUTPATIENT
Start: 2025-04-29 | End: 2025-04-29

## 2025-04-29 RX ADMIN — ATORVASTATIN CALCIUM 20 MG: 10 TABLET, FILM COATED ORAL at 21:13

## 2025-04-29 RX ADMIN — ACETAMINOPHEN 650 MG: 325 TABLET ORAL at 21:13

## 2025-04-29 RX ADMIN — PIPERACILLIN AND TAZOBACTAM 3.38 G: 3; .375 INJECTION, POWDER, FOR SOLUTION INTRAVENOUS at 22:51

## 2025-04-29 RX ADMIN — PIPERACILLIN AND TAZOBACTAM 3.38 G: 3; .375 INJECTION, POWDER, FOR SOLUTION INTRAVENOUS at 06:09

## 2025-04-29 RX ADMIN — POTASSIUM CHLORIDE 40 MEQ: 1500 TABLET, EXTENDED RELEASE ORAL at 12:06

## 2025-04-29 RX ADMIN — SODIUM CHLORIDE: 0.9 INJECTION, SOLUTION INTRAVENOUS at 09:28

## 2025-04-29 RX ADMIN — SODIUM CHLORIDE: 0.9 INJECTION, SOLUTION INTRAVENOUS at 00:53

## 2025-04-29 RX ADMIN — PIPERACILLIN AND TAZOBACTAM 3.38 G: 3; .375 INJECTION, POWDER, FOR SOLUTION INTRAVENOUS at 12:06

## 2025-04-29 RX ADMIN — ACETAMINOPHEN 650 MG: 325 TABLET ORAL at 00:52

## 2025-04-29 RX ADMIN — LEVOTHYROXINE SODIUM 75 MCG: 0.03 TABLET ORAL at 06:10

## 2025-04-29 RX ADMIN — PIPERACILLIN AND TAZOBACTAM 3.38 G: 3; .375 INJECTION, POWDER, FOR SOLUTION INTRAVENOUS at 16:08

## 2025-04-29 ASSESSMENT — ACTIVITIES OF DAILY LIVING (ADL)
ADLS_ACUITY_SCORE: 37
ADLS_ACUITY_SCORE: 38
ADLS_ACUITY_SCORE: 37
ADLS_ACUITY_SCORE: 38
ADLS_ACUITY_SCORE: 37
ADLS_ACUITY_SCORE: 38
ADLS_ACUITY_SCORE: 37

## 2025-04-29 NOTE — PLAN OF CARE
Problem: Adult Inpatient Plan of Care  Goal: Absence of Hospital-Acquired Illness or Injury  Outcome: Progressing  Intervention: Identify and Manage Fall Risk  Recent Flowsheet Documentation  Taken 4/29/2025 0000 by Patricia Alaniz RN  Safety Promotion/Fall Prevention:   assistive device/personal items within reach   clutter free environment maintained   nonskid shoes/slippers when out of bed   safety round/check completed   room organization consistent   patient and family education  Intervention: Prevent Skin Injury  Recent Flowsheet Documentation  Taken 4/29/2025 0000 by Patricia Alaniz RN  Body Position: position changed independently  Intervention: Prevent and Manage VTE (Venous Thromboembolism) Risk  Recent Flowsheet Documentation  Taken 4/29/2025 0000 by Patricia Alaniz RN  VTE Prevention/Management: SCDs off (sequential compression devices)  Intervention: Prevent Infection  Recent Flowsheet Documentation  Taken 4/29/2025 0000 by Patricia Alaniz RN  Infection Prevention: hand hygiene promoted     Problem: Delirium  Goal: Improved Behavioral Control  Intervention: Minimize Safety Risk  Recent Flowsheet Documentation  Taken 4/29/2025 0000 by Patricia Alaniz RN  Enhanced Safety Measures: pain management     Problem: Pain Acute  Goal: Optimal Pain Control and Function  Intervention: Prevent or Manage Pain  Recent Flowsheet Documentation  Taken 4/29/2025 0000 by Patricia Alaniz RN  Medication Review/Management: medications reviewed   Goal Outcome Evaluation:    Blood pressure 128/62, pulse 90, temperature 97.9  F, temperature source Oral, resp. rate 16, SpO2 93% on RA    Tylenol given for HA    Pt c/o of pain of the lower abdomen when pressure is applied    Pt was incontinent of diarrhea, incontinents care provided.    Pt was placed on purewick overnight     SBA with a walker to bathroom    Tolerating continuous fluids and IV abx    Pt maintained NPO except for ice chips overnight

## 2025-04-29 NOTE — PLAN OF CARE
Goal Outcome Evaluation:  Patient is alert and oriented and able to make needs known.  Pain is minimal at rest, states it increases with movement/coughing but declined prn medication.  NPO except ice chips and sips with meds.  Purewick in place overnight otherwise was getting up to the BR today.  PT/OT following.  IV Zosyn and IVF hydration.  Had BM x2 today.

## 2025-04-29 NOTE — PROGRESS NOTES
General Surgery Progress Note    Date of Service: 04/29/25    Subjective  No acute events overnight.  Subjectively patient states she feels probably better.  She has no pain at rest and even with walking around it is not too problematic.  She only has difficulty with coughing, sneezing, laughing and etc.  She has had multiple liquid bowel movements.    Objective  BP (!) 144/68 (BP Location: Left arm)   Pulse 80   Temp 97.5  F (36.4  C) (Oral)   Resp 18   Wt 76.2 kg (167 lb 15.9 oz)   SpO2 95%   BMI 28.39 kg/m      Patient generally looks more comfortable today than she did yesterday  Heart rate in the 80s  Nonlabored breathing on room air  Abdomen soft and nondistended.  Still rebound tenderness consistent with peritonitis although much more localized to the lower abdomen and less severe today.    I/O last 3 completed shifts:  In: 1024 [I.V.:1024]  Out: 925 [Urine:925]    WBC 10.8 today    Assessment and Plan  70-year-old woman admitted with a perforated diverticulitis versus stercoral perforation with mild pneumoperitoneum.  Clinically she remained stable to improved.  I think we can reasonably continue to monitor closely.  I will plan to repeat the CT scan tomorrow.    - Okay to advance to clear liquid diet  - Continue IV antibiotic  - Ambulation as able  - Repeat CT scan tomorrow morning.  This is been ordered  - Surgery will continue to follow    Zohaib Velez MD  General Surgeon  [facility]  Surgery [address]  [place] [address]  Office: [phone]

## 2025-04-29 NOTE — PROGRESS NOTES
"[facility]    Medicine Progress Note - Hospitalist Service    Date of Admission:  4/27/2025    Assessment & Plan   70-year-old female with recent surgery to RLE presented with abdominal pain and found to have perforated diverticulitis.    #Sigmoid colon diverticulitis with perforation  #Sepsis  General Surgery consulted: No plan for surgical intervention unless clinical status decompensates  Discussed with Dr. Rosie Cedeno: continue IV abx, advance to CLD, CT tomorrow  Pain control    #Recent RLE tendon repair and trochanteric bursectomy 4/22/25  CT shows fluid collection  Discussed with Ortho PA, not concerned for abscess  Stopped Vancomycin  Ortho will continue following    #Acute blood loss anemia  Likely related to recent surgery and sepsis  Monitor CBC      DVT ppx: PCDs          Diet: Clear Liquid Diet    Luz Catheter: Not present  Lines: None     Cardiac Monitoring: None  Code Status: Full Code      Clinically Significant Risk Factors        # Hypokalemia: Lowest K = 3.3 mmol/L in last 2 days, will replace as needed    # Hypocalcemia: Lowest Ca = 8.2 mg/dL in last 2 days, will monitor and replace as appropriate     # Hypoalbuminemia: Lowest albumin = 3.3 g/dL at 4/28/2025  6:14 AM, will monitor as appropriate  # Coagulation Defect: INR = 1.16 (Ref range: 0.85 - 1.15) and/or PTT = 31 Seconds (Ref range: 22 - 38 Seconds), will monitor for bleeding               # Overweight: Estimated body mass index is 28.39 kg/m  as calculated from the following:    Height as of 4/22/25: 1.638 m (5' 4.5\").    Weight as of this encounter: 76.2 kg (167 lb 15.9 oz)., PRESENT ON ADMISSION            Social Drivers of Health    Housing Stability: High Risk (4/28/2025)    Housing Stability     Do you have housing? : No     Are you worried about losing your housing?: No   Physical Activity: Insufficiently Active (3/16/2024)    Received from [facility]    Exercise Vital Sign     Days of Exercise per " Week: 3 days     Minutes of Exercise per Session: 30 min          Disposition Plan     Medically Ready for Discharge: Anticipated in 2-4 Days             Chava Henao DO  Hospitalist Service  [facility]  Securely message with [employer] (more info)  Text page via [employer] Paging/Directory   ______________________________________________________________________    Interval History   Now passing loose stool    Physical Exam   Vital Signs: Temp: 97.5  F (36.4  C) Temp src: Oral BP: (!) 144/68 Pulse: 80   Resp: 18 SpO2: 95 % O2 Device: None (Room air)    Weight: 167 lbs 15.85 oz    General Appearance:  No acute distress  Respiratory: Clear to auscultation bilaterally  Cardiovascular: Regular rate and rhythm  GI: Normal bowel sounds, abdomen is soft with no rebound    Medical Decision Making             Data

## 2025-04-29 NOTE — PLAN OF CARE
Pain: Patient states mild abdominal discomfort and headache at times, increasing somewhat with start of clear liquid diet, but declines PRN tylenol.  Neuro: Alert and oriented  Resp: Lung sounds clear on room air  Cardio: WNL  GI/: Patient states loose bowel movements  Skin: Right hip dressing C/D/I  Activity: Pivot transfers to commode independently. Requires assistance back to bed to maintain hip alignment.   Nutrition/IV: Tolerating clear liquid diet. Continuous IVF discontinued.     6:50 PM  Patient reports initial discomfort with PO liquids resolved.

## 2025-04-29 NOTE — PROGRESS NOTES
Orthopedic Progress Note      Assessment:    Status post right gluteal tendon repair on 4/22/2025 with Dr. Issa     Plan:   - No indication for urgent surgical intervention from an orthopedic standpoint.  - Brace at all times.  - Flatfoot weightbearing to the right lower extremity, crutches to be used when ambulating  - No active hip abduction and internal rotation.  No passive hip external rotation and abduction  - Keep dressing clean, dry, and intact until postop visit  - Follow-up with Dr. Issa following discharge  - PT/OT  - Will continue to follow during this admission      Subjective:    Patient reports feeling well today. She has very minimal pain in her hip at this time. Orthotics came yesterday and adjusted her splint which she now says is much more comfortable. She overall feels like her hip is doing very well. All questions/concerns answered.      Objective:  BP (!) 144/68 (BP Location: Left arm)   Pulse 80   Temp 97.5  F (36.4  C) (Oral)   Resp 18   Wt 76.2 kg (167 lb 15.9 oz)   SpO2 95%   BMI 28.39 kg/m      Patient is A&Ox3, sitting up at bedside  Calves without tenderness, neg Sandeep's  Brisk capillary refill in the toes.   Palpable Right dorsalis pedis pulse. Right foot warm & well-perfused.  Sensation is intact to light touch & equal bilaterally in the femoral, DP, SP & tibial nerve distributions.  Mild TTP of right hip over incision.   Knee extension/flexion without pain.  Ankle DF/PF/inversion/eversion without pain.  Dressings C/D/I.      Pertinent Labs   Lab Results: personally reviewed.   Lab Results   Component Value Date    INR 1.16 (H) 04/28/2025    INR 1.04 04/27/2025     Lab Results   Component Value Date    WBC 10.8 04/29/2025    HGB 10.2 (L) 04/29/2025    HCT 33.2 (L) 04/29/2025    MCV 92 04/29/2025     04/29/2025     Lab Results   Component Value Date     04/29/2025    CO2 26 04/29/2025         Report completed by:  Jossy Adams PA-C  Date: 04/29/2025  [facility]

## 2025-04-30 ENCOUNTER — APPOINTMENT (OUTPATIENT)
Dept: CT IMAGING | Facility: HOSPITAL | Age: 70
DRG: 853 | End: 2025-04-30
Attending: SURGERY
Payer: MEDICARE

## 2025-04-30 LAB
ANION GAP SERPL CALCULATED.3IONS-SCNC: 8 MMOL/L (ref 7–15)
BASOPHILS # BLD AUTO: 0 10E3/UL (ref 0–0.2)
BASOPHILS NFR BLD AUTO: 1 %
BUN SERPL-MCNC: 12.7 MG/DL (ref 8–23)
CALCIUM SERPL-MCNC: 8.6 MG/DL (ref 8.8–10.4)
CHLORIDE SERPL-SCNC: 109 MMOL/L (ref 98–107)
CREAT SERPL-MCNC: 0.96 MG/DL (ref 0.51–0.95)
EGFRCR SERPLBLD CKD-EPI 2021: 63 ML/MIN/1.73M2
EOSINOPHIL # BLD AUTO: 0.2 10E3/UL (ref 0–0.7)
EOSINOPHIL NFR BLD AUTO: 2 %
ERYTHROCYTE [DISTWIDTH] IN BLOOD BY AUTOMATED COUNT: 13.2 % (ref 10–15)
GLUCOSE SERPL-MCNC: 100 MG/DL (ref 70–99)
HCO3 SERPL-SCNC: 24 MMOL/L (ref 22–29)
HCT VFR BLD AUTO: 29.1 % (ref 35–47)
HGB BLD-MCNC: 9.1 G/DL (ref 11.7–15.7)
IMM GRANULOCYTES # BLD: 0.1 10E3/UL
IMM GRANULOCYTES NFR BLD: 1 %
LYMPHOCYTES # BLD AUTO: 1.2 10E3/UL (ref 0.8–5.3)
LYMPHOCYTES NFR BLD AUTO: 14 %
MAGNESIUM SERPL-MCNC: 2.3 MG/DL (ref 1.7–2.3)
MCH RBC QN AUTO: 28.4 PG (ref 26.5–33)
MCHC RBC AUTO-ENTMCNC: 31.3 G/DL (ref 31.5–36.5)
MCV RBC AUTO: 91 FL (ref 78–100)
MONOCYTES # BLD AUTO: 0.7 10E3/UL (ref 0–1.3)
MONOCYTES NFR BLD AUTO: 8 %
NEUTROPHILS # BLD AUTO: 6.5 10E3/UL (ref 1.6–8.3)
NEUTROPHILS NFR BLD AUTO: 75 %
NRBC # BLD AUTO: 0 10E3/UL
NRBC BLD AUTO-RTO: 0 /100
PLATELET # BLD AUTO: 280 10E3/UL (ref 150–450)
POTASSIUM SERPL-SCNC: 3.5 MMOL/L (ref 3.4–5.3)
RBC # BLD AUTO: 3.2 10E6/UL (ref 3.8–5.2)
SODIUM SERPL-SCNC: 141 MMOL/L (ref 135–145)
WBC # BLD AUTO: 8.7 10E3/UL (ref 4–11)

## 2025-04-30 PROCEDURE — 80048 BASIC METABOLIC PNL TOTAL CA: CPT | Performed by: HOSPITALIST

## 2025-04-30 PROCEDURE — 36415 COLL VENOUS BLD VENIPUNCTURE: CPT | Performed by: HOSPITALIST

## 2025-04-30 PROCEDURE — 250N000011 HC RX IP 250 OP 636: Performed by: SURGERY

## 2025-04-30 PROCEDURE — 85025 COMPLETE CBC W/AUTO DIFF WBC: CPT | Performed by: HOSPITALIST

## 2025-04-30 PROCEDURE — 83735 ASSAY OF MAGNESIUM: CPT | Performed by: INTERNAL MEDICINE

## 2025-04-30 PROCEDURE — 120N000001 HC R&B MED SURG/OB

## 2025-04-30 PROCEDURE — 250N000013 HC RX MED GY IP 250 OP 250 PS 637: Performed by: INTERNAL MEDICINE

## 2025-04-30 PROCEDURE — 250N000011 HC RX IP 250 OP 636: Performed by: INTERNAL MEDICINE

## 2025-04-30 PROCEDURE — 99232 SBSQ HOSP IP/OBS MODERATE 35: CPT | Mod: FS | Performed by: SURGERY

## 2025-04-30 PROCEDURE — 74177 CT ABD & PELVIS W/CONTRAST: CPT

## 2025-04-30 PROCEDURE — 99232 SBSQ HOSP IP/OBS MODERATE 35: CPT | Performed by: INTERNAL MEDICINE

## 2025-04-30 PROCEDURE — 99232 SBSQ HOSP IP/OBS MODERATE 35: CPT | Mod: 57

## 2025-04-30 RX ORDER — IOPAMIDOL 755 MG/ML
82 INJECTION, SOLUTION INTRAVASCULAR ONCE
Status: COMPLETED | OUTPATIENT
Start: 2025-04-30 | End: 2025-04-30

## 2025-04-30 RX ORDER — POTASSIUM CHLORIDE 1500 MG/1
20 TABLET, EXTENDED RELEASE ORAL ONCE
Status: COMPLETED | OUTPATIENT
Start: 2025-04-30 | End: 2025-04-30

## 2025-04-30 RX ORDER — KETOROLAC TROMETHAMINE 15 MG/ML
15 INJECTION, SOLUTION INTRAMUSCULAR; INTRAVENOUS EVERY 12 HOURS PRN
Status: DISPENSED | OUTPATIENT
Start: 2025-04-30 | End: 2025-05-02

## 2025-04-30 RX ADMIN — PIPERACILLIN AND TAZOBACTAM 3.38 G: 3; .375 INJECTION, POWDER, FOR SOLUTION INTRAVENOUS at 04:59

## 2025-04-30 RX ADMIN — PIPERACILLIN AND TAZOBACTAM 3.38 G: 3; .375 INJECTION, POWDER, FOR SOLUTION INTRAVENOUS at 17:10

## 2025-04-30 RX ADMIN — PIPERACILLIN AND TAZOBACTAM 3.38 G: 3; .375 INJECTION, POWDER, FOR SOLUTION INTRAVENOUS at 22:50

## 2025-04-30 RX ADMIN — ATORVASTATIN CALCIUM 20 MG: 10 TABLET, FILM COATED ORAL at 20:35

## 2025-04-30 RX ADMIN — IOPAMIDOL 82 ML: 755 INJECTION, SOLUTION INTRAVENOUS at 13:19

## 2025-04-30 RX ADMIN — ACETAMINOPHEN 650 MG: 325 TABLET ORAL at 10:01

## 2025-04-30 RX ADMIN — PIPERACILLIN AND TAZOBACTAM 3.38 G: 3; .375 INJECTION, POWDER, FOR SOLUTION INTRAVENOUS at 11:08

## 2025-04-30 RX ADMIN — ACETAMINOPHEN 650 MG: 325 TABLET ORAL at 21:39

## 2025-04-30 RX ADMIN — POTASSIUM CHLORIDE 20 MEQ: 1500 TABLET, EXTENDED RELEASE ORAL at 10:01

## 2025-04-30 RX ADMIN — LEVOTHYROXINE SODIUM 75 MCG: 0.03 TABLET ORAL at 05:43

## 2025-04-30 RX ADMIN — ACETAMINOPHEN 650 MG: 325 TABLET ORAL at 04:57

## 2025-04-30 ASSESSMENT — ACTIVITIES OF DAILY LIVING (ADL)
ADLS_ACUITY_SCORE: 37

## 2025-04-30 NOTE — PROGRESS NOTES
Orthopedic Progress Note      Assessment:    Status post right gluteal tendon repair on 4/22/2025 with Dr. Issa     Plan:   - No indication for urgent surgical intervention from an orthopedic standpoint.  - Brace at all times.  - Flatfoot weightbearing to the right lower extremity, crutches to be used when ambulating  - No active hip abduction and internal rotation.  No passive hip external rotation and abduction  - Keep dressing clean, dry, and intact until postop visit  - Follow-up with Dr. Issa following discharge  - PT/OT  - Will continue to follow during this admission      Subjective:    Patient reports feeling well today. She has very minimal pain in her hip at this time. She overall feels like her hip is doing very well. She is concerned about getting into bed at home, since she will have to get in from the opposite side as she has been in the hospital. I encouraged her to discuss this with PT today and practice with them while she is here. All questions/concerns answered.      Objective:  /66 (BP Location: Left arm)   Pulse 69   Temp 98.1  F (36.7  C) (Oral)   Resp 18   Wt 76.2 kg (167 lb 15.9 oz)   SpO2 96%   BMI 28.39 kg/m      Patient is A&Ox3, sitting up at bedside  Calves without tenderness, neg Sandeep's  Brisk capillary refill in the toes.   Palpable Right dorsalis pedis pulse. Right foot warm & well-perfused.  Sensation is intact to light touch & equal bilaterally in the femoral, DP, SP & tibial nerve distributions.  Mild TTP of right hip over incision.   Knee extension/flexion without pain.  Ankle DF/PF/inversion/eversion without pain.  Dressings C/D/I.      Pertinent Labs   Lab Results: personally reviewed.   Lab Results   Component Value Date    INR 1.16 (H) 04/28/2025    INR 1.04 04/27/2025     Lab Results   Component Value Date    WBC 8.7 04/30/2025    HGB 9.1 (L) 04/30/2025    HCT 29.1 (L) 04/30/2025    MCV 91 04/30/2025     04/30/2025     Lab Results   Component Value  Date     04/30/2025    CO2 24 04/30/2025         Report completed by:  Jossy Adams PA-C  Date: 04/30/2025  [facility]

## 2025-04-30 NOTE — PLAN OF CARE
Problem: Pain Acute  Goal: Optimal Pain Control and Function  Outcome: Progressing     Problem: Intestinal Obstruction  Goal: Fluid and Electrolyte Balance  Outcome: Progressing     Problem: Intestinal Obstruction  Goal: Absence of Infection Signs and Symptoms  Outcome: Progressing     Problem: Intestinal Obstruction  Goal: Optimize Nutrition Status  Outcome: Progressing   Goal Outcome Evaluation:    Patient slept between cares. VSS on room air. At bedtime abdominal pain was up to 8/10. Patient hesitant to use stronger pain meds. Requested for tylenol and a hot pack for pain. Pain improved on reassessment. Up to bedside commode with SBA. Continues with iv antibiotics. Tolerating clear liquids. Plan for repeat imaging today to reevaluate colonic perforation.

## 2025-04-30 NOTE — PROGRESS NOTES
General Surgery Progress Note:    Hospital Day # 3    ASSESSMENT:     1. Gluteal tendinitis of right buttock    2. Diverticulitis of large intestine with perforation without abscess, unspecified bleeding status        Michelle Isaac is a 70 year old female here with several days of abdominal pain with imaging demonstrating sigmoid diverticulitis with small amount pneumoperitoneum. Patient is afebrile, nontachycardic, without leukocytosis and tolerating conservative management and clear liquid diet. Continues to have bowel functions; increase instance of abdominal pain overnight otherwise stable exam. Plan for repeat CT today    PLAN:   -Clear liquids, NPO if severe abdominal pain or change in clinical picture  -Plan for CT abdomen, will follow results  -Reach out if severe abdominal pain or peritonitis  -Will await CT results prior to full plan  -Medical management per primary team    SUBJECTIVE:   Michelle Isaac was seen on rounds. She is doing okay but did have an increase in lower abdominal pain with drinking juice and just before having bowel movements. These are still soft diarrhea without concerns for blood. Also prior to passing flatus. Some relief after passing gas / stool but not fully resolved. Denies fever, chills, nausea, vomiting. Denies severe abdominal pain, more cramping in nature. Tolerating clear liquid diet.     History of having pain in the right lower quadrant with coughing or sneezing. Also had this last night    VITALS RANGE:  Temp:  [98  F (36.7  C)-98.5  F (36.9  C)] 98.1  F (36.7  C)  Pulse:  [69-85] 69  Resp:  [18] 18  BP: (123-132)/(60-66) 131/66  SpO2:  [94 %-97 %] 96 %    Physical Exam:  General: patient seen resting in bed in no acute distress  Resp: no increased work of breathing, breathing comfortably on room air  Abdomen: Generally soft and somewhat distended in the lower abdomen.  Generally without tenderness on the upper quadrants.  Some mild tenderness with palpation mostly  over the suprapubic area.  No peritoneal signs or guarding,  Extremities: No edema, cyanosis, or obvious deformities visualized on exam    Admission on 04/27/2025   Component Date Value    INR 04/27/2025 1.04     aPTT 04/27/2025 31     Lactic Acid, Initial 04/27/2025 1.4     WBC Count 04/27/2025 12.2 (H)     RBC Count 04/27/2025 4.30     Hemoglobin 04/27/2025 12.2     Hematocrit 04/27/2025 37.3     MCV 04/27/2025 87     MCH 04/27/2025 28.4     MCHC 04/27/2025 32.7     RDW 04/27/2025 12.9     Platelet Count 04/27/2025 330     % Neutrophils 04/27/2025 84     % Lymphocytes 04/27/2025 10     % Monocytes 04/27/2025 6     % Eosinophils 04/27/2025 0     % Basophils 04/27/2025 0     % Immature Granulocytes 04/27/2025 1     NRBCs per 100 WBC 04/27/2025 0     Absolute Neutrophils 04/27/2025 10.2 (H)     Absolute Lymphocytes 04/27/2025 1.2     Absolute Monocytes 04/27/2025 0.7     Absolute Eosinophils 04/27/2025 0.0     Absolute Basophils 04/27/2025 0.0     Absolute Immature Granul* 04/27/2025 0.1     Absolute NRBCs 04/27/2025 0.0     ABO/RH(D) 04/27/2025 A POS     Antibody Screen 04/27/2025 Negative     SPECIMEN EXPIRATION DATE 04/27/2025 [id number]     Hold Specimen 04/27/2025 JIC     Sodium 04/28/2025 136     Potassium 04/28/2025 3.7     Carbon Dioxide (CO2) 04/28/2025 25     Anion Gap 04/28/2025 8     Urea Nitrogen 04/28/2025 12.0     Creatinine 04/28/2025 0.70     GFR Estimate 04/28/2025 >90     Calcium 04/28/2025 8.2 (L)     Chloride 04/28/2025 103     Glucose 04/28/2025 123 (H)     Alkaline Phosphatase 04/28/2025 181 (H)     AST 04/28/2025 74 (H)     ALT 04/28/2025 147 (H)     Protein Total 04/28/2025 6.3 (L)     Albumin 04/28/2025 3.3 (L)     Bilirubin Total 04/28/2025 0.9     INR 04/28/2025 1.16 (H)     MRSA Target DNA 04/28/2025 Negative     SA Target DNA 04/28/2025 Positive     WBC Count 04/28/2025 10.7     RBC Count 04/28/2025 3.56 (L)     Hemoglobin 04/28/2025 10.0 (L)     Hematocrit 04/28/2025 31.5 (L)      MCV 04/28/2025 89     MCH 04/28/2025 28.1     MCHC 04/28/2025 31.7     RDW 04/28/2025 12.9     Platelet Count 04/28/2025 266     % Neutrophils 04/28/2025 79     % Lymphocytes 04/28/2025 13     % Monocytes 04/28/2025 6     % Eosinophils 04/28/2025 0     % Basophils 04/28/2025 0     % Immature Granulocytes 04/28/2025 1     NRBCs per 100 WBC 04/28/2025 0     Absolute Neutrophils 04/28/2025 8.4 (H)     Absolute Lymphocytes 04/28/2025 1.4     Absolute Monocytes 04/28/2025 0.7     Absolute Eosinophils 04/28/2025 0.0     Absolute Basophils 04/28/2025 0.0     Absolute Immature Granul* 04/28/2025 0.1     Absolute NRBCs 04/28/2025 0.0     Sodium 04/29/2025 141     Potassium 04/29/2025 3.3 (L)     Chloride 04/29/2025 106     Carbon Dioxide (CO2) 04/29/2025 26     Anion Gap 04/29/2025 9     Urea Nitrogen 04/29/2025 13.1     Creatinine 04/29/2025 0.94     GFR Estimate 04/29/2025 64     Calcium 04/29/2025 8.5 (L)     Glucose 04/29/2025 103 (H)     WBC Count 04/29/2025 10.8     RBC Count 04/29/2025 3.63 (L)     Hemoglobin 04/29/2025 10.2 (L)     Hematocrit 04/29/2025 33.2 (L)     MCV 04/29/2025 92     MCH 04/29/2025 28.1     MCHC 04/29/2025 30.7 (L)     RDW 04/29/2025 13.1     Platelet Count 04/29/2025 296     % Neutrophils 04/29/2025 78     % Lymphocytes 04/29/2025 13     % Monocytes 04/29/2025 7     % Eosinophils 04/29/2025 1     % Basophils 04/29/2025 0     % Immature Granulocytes 04/29/2025 1     NRBCs per 100 WBC 04/29/2025 0     Absolute Neutrophils 04/29/2025 8.4 (H)     Absolute Lymphocytes 04/29/2025 1.4     Absolute Monocytes 04/29/2025 0.8     Absolute Eosinophils 04/29/2025 0.1     Absolute Basophils 04/29/2025 0.0     Absolute Immature Granul* 04/29/2025 0.1     Absolute NRBCs 04/29/2025 0.0     Sodium 04/30/2025 141     Potassium 04/30/2025 3.5     Chloride 04/30/2025 109 (H)     Carbon Dioxide (CO2) 04/30/2025 24     Anion Gap 04/30/2025 8     Urea Nitrogen 04/30/2025 12.7     Creatinine 04/30/2025 0.96 (H)     GFR  Estimate 04/30/2025 63     Calcium 04/30/2025 8.6 (L)     Glucose 04/30/2025 100 (H)     WBC Count 04/30/2025 8.7     RBC Count 04/30/2025 3.20 (L)     Hemoglobin 04/30/2025 9.1 (L)     Hematocrit 04/30/2025 29.1 (L)     MCV 04/30/2025 91     MCH 04/30/2025 28.4     MCHC 04/30/2025 31.3 (L)     RDW 04/30/2025 13.2     Platelet Count 04/30/2025 280     % Neutrophils 04/30/2025 75     % Lymphocytes 04/30/2025 14     % Monocytes 04/30/2025 8     % Eosinophils 04/30/2025 2     % Basophils 04/30/2025 1     % Immature Granulocytes 04/30/2025 1     NRBCs per 100 WBC 04/30/2025 0     Absolute Neutrophils 04/30/2025 6.5     Absolute Lymphocytes 04/30/2025 1.2     Absolute Monocytes 04/30/2025 0.7     Absolute Eosinophils 04/30/2025 0.2     Absolute Basophils 04/30/2025 0.0     Absolute Immature Granul* 04/30/2025 0.1     Absolute NRBCs 04/30/2025 0.0     Magnesium 04/30/2025 2.3         Oanh Martinez PA-C  [facility]  [facility]  [place] [address]  [facility] [phone]

## 2025-04-30 NOTE — PROGRESS NOTES
"[facility]    Medicine Progress Note - Hospitalist Service    Date of Admission:  4/27/2025    Assessment & Plan   70-year-old female with recent surgery to RLE presented with abdominal pain and found to have perforated diverticulitis.    #Sigmoid colon diverticulitis with perforation  General Surgery consulted: No plan for surgical intervention unless clinical status decompensates  Continue IV antibiotics.  On clear liquid diet.  Planning for CT imaging today.  Pain control with Tylenol.  Patient does not want to take any narcotic pain medications.  Added as needed Toradol    #Recent RLE tendon repair and trochanteric bursectomy 4/22/25  CT shows fluid collection.  Previous hospitalist discussed with Ortho PA, not concerned for abscess  Continue activity level as instructed by orthopedic team    #Acute blood loss anemia  Likely related to recent surgery and sepsis, hemodilution  Monitor CBC intermittently         Diet: Clear Liquid Diet    DVT Prophylaxis: Pneumatic Compression Devices and Ambulate every shift  Luz Catheter: Not present  Lines: None     Cardiac Monitoring: None  Code Status: Full Code      Clinically Significant Risk Factors        # Hypokalemia: Lowest K = 3.3 mmol/L in last 2 days, will replace as needed   # Hyperchloremia: Highest Cl = 109 mmol/L in last 2 days, will monitor as appropriate          # Hypoalbuminemia: Lowest albumin = 3.3 g/dL at 4/28/2025  6:14 AM, will monitor as appropriate  # Coagulation Defect: INR = 1.16 (Ref range: 0.85 - 1.15) and/or PTT = 31 Seconds (Ref range: 22 - 38 Seconds), will monitor for bleeding               # Overweight: Estimated body mass index is 28.39 kg/m  as calculated from the following:    Height as of 4/22/25: 1.638 m (5' 4.5\").    Weight as of this encounter: 76.2 kg (167 lb 15.9 oz)., PRESENT ON ADMISSION            Social Drivers of Health    Housing Stability: High Risk (4/28/2025)    Housing Stability     Do you have " housing? : No     Are you worried about losing your housing?: No   Physical Activity: Insufficiently Active (3/16/2024)    Received from [facility]    Exercise Vital Sign     Days of Exercise per Week: 3 days     Minutes of Exercise per Session: 30 min          Disposition Plan     Medically Ready for Discharge: Anticipated in 2-4 Days             Vladislav MENDOZA MD  Hospitalist Service  [facility]  Securely message with [employer] (more info)  Text page via [employer] Paging/Directory   ______________________________________________________________________    Interval History   Patient is new to me.  Patient seen and examined.  Notes, labs, imaging report personally reviewed.  Patient reported lower abdominal pain was better compared to yesterday, still intermittent pain and feeling distended.  However, no nausea or vomiting.  Denied feeling fever or chills.  Patient reported that she does not want to take narcotic pain medications.  Discussed with nursing staffs.    Physical Exam   Vital Signs: Temp: 98.5  F (36.9  C) Temp src: Oral BP: (!) 140/65 Pulse: 68   Resp: 18 SpO2: 98 % O2 Device: None (Room air)    Weight: 167 lbs 15.85 oz      General: Not in obvious distress.  HEENT: Normocephalic, supple neck  Chest: Clear to auscultation bilateral anteriorly, no wheezing  Heart: S1S2 normal, regular  Abdomen: Soft.  Lower abdominal tenderness  Neuro: alert and awake, grossly non-focal        Medical Decision Making             Data     I have personally reviewed the following data over the past 24 hrs:    8.7  \   9.1 (L)   / 280     141 109 (H) 12.7 /  100 (H)   3.5 24 0.96 (H) \       Imaging results reviewed over the past 24 hrs:   No results found for this or any previous visit (from the past 24 hours).

## 2025-04-30 NOTE — PLAN OF CARE
Goal Outcome Evaluation:    Problem: Adult Inpatient Plan of Care  Goal: Optimal Comfort and Wellbeing  Outcome: Progressing  Intervention: Provide Person-Centered Care  Recent Flowsheet Documentation  Taken 4/30/2025 1230 by Solange Gillespie RN  Trust Relationship/Rapport:   empathic listening provided   thoughts/feelings acknowledged  PRN tylenol for pain control. States her pain is improved today compared to overnight.     CT scan completed this afternoon  Zosyn  Clear liquid diet  Able to make needs known  Up to commode and toilet in room independently

## 2025-05-01 ENCOUNTER — ANESTHESIA (OUTPATIENT)
Dept: SURGERY | Facility: HOSPITAL | Age: 70
End: 2025-05-01
Payer: MEDICARE

## 2025-05-01 ENCOUNTER — ANESTHESIA EVENT (OUTPATIENT)
Dept: SURGERY | Facility: HOSPITAL | Age: 70
End: 2025-05-01
Payer: MEDICARE

## 2025-05-01 LAB
ALBUMIN SERPL BCG-MCNC: 3.5 G/DL (ref 3.5–5.2)
ALP SERPL-CCNC: 350 U/L (ref 40–150)
ALT SERPL W P-5'-P-CCNC: 65 U/L (ref 0–50)
ANION GAP SERPL CALCULATED.3IONS-SCNC: 15 MMOL/L (ref 7–15)
AST SERPL W P-5'-P-CCNC: 23 U/L (ref 0–45)
BILIRUB SERPL-MCNC: 0.4 MG/DL
BUN SERPL-MCNC: 12 MG/DL (ref 8–23)
CALCIUM SERPL-MCNC: 8.9 MG/DL (ref 8.8–10.4)
CHLORIDE SERPL-SCNC: 103 MMOL/L (ref 98–107)
CREAT SERPL-MCNC: 0.89 MG/DL (ref 0.51–0.95)
EGFRCR SERPLBLD CKD-EPI 2021: 69 ML/MIN/1.73M2
ERYTHROCYTE [DISTWIDTH] IN BLOOD BY AUTOMATED COUNT: 13 % (ref 10–15)
GLUCOSE SERPL-MCNC: 77 MG/DL (ref 70–99)
HCO3 SERPL-SCNC: 22 MMOL/L (ref 22–29)
HCT VFR BLD AUTO: 33.2 % (ref 35–47)
HGB BLD-MCNC: 10.4 G/DL (ref 11.7–15.7)
MCH RBC QN AUTO: 28.2 PG (ref 26.5–33)
MCHC RBC AUTO-ENTMCNC: 31.3 G/DL (ref 31.5–36.5)
MCV RBC AUTO: 90 FL (ref 78–100)
PLATELET # BLD AUTO: 375 10E3/UL (ref 150–450)
POTASSIUM SERPL-SCNC: 3.3 MMOL/L (ref 3.4–5.3)
POTASSIUM SERPL-SCNC: 3.8 MMOL/L (ref 3.4–5.3)
PROT SERPL-MCNC: 6.6 G/DL (ref 6.4–8.3)
RBC # BLD AUTO: 3.69 10E6/UL (ref 3.8–5.2)
SODIUM SERPL-SCNC: 140 MMOL/L (ref 135–145)
WBC # BLD AUTO: 9.1 10E3/UL (ref 4–11)

## 2025-05-01 PROCEDURE — 36415 COLL VENOUS BLD VENIPUNCTURE: CPT | Performed by: INTERNAL MEDICINE

## 2025-05-01 PROCEDURE — 250N000011 HC RX IP 250 OP 636: Mod: JZ | Performed by: ANESTHESIOLOGY

## 2025-05-01 PROCEDURE — 250N000013 HC RX MED GY IP 250 OP 250 PS 637: Performed by: INTERNAL MEDICINE

## 2025-05-01 PROCEDURE — 370N000017 HC ANESTHESIA TECHNICAL FEE, PER MIN: Performed by: SURGERY

## 2025-05-01 PROCEDURE — 49320 DIAG LAPARO SEPARATE PROC: CPT | Performed by: SURGERY

## 2025-05-01 PROCEDURE — 3E1M48Z IRRIGATION OF PERITONEAL CAVITY USING IRRIGATING SUBSTANCE, PERCUTANEOUS ENDOSCOPIC APPROACH: ICD-10-PCS | Performed by: SURGERY

## 2025-05-01 PROCEDURE — 258N000003 HC RX IP 258 OP 636: Performed by: ANESTHESIOLOGY

## 2025-05-01 PROCEDURE — 250N000011 HC RX IP 250 OP 636: Performed by: SURGERY

## 2025-05-01 PROCEDURE — 272N000001 HC OR GENERAL SUPPLY STERILE: Performed by: SURGERY

## 2025-05-01 PROCEDURE — 84132 ASSAY OF SERUM POTASSIUM: CPT | Performed by: INTERNAL MEDICINE

## 2025-05-01 PROCEDURE — 82565 ASSAY OF CREATININE: CPT | Performed by: INTERNAL MEDICINE

## 2025-05-01 PROCEDURE — 250N000013 HC RX MED GY IP 250 OP 250 PS 637: Performed by: ANESTHESIOLOGY

## 2025-05-01 PROCEDURE — 0J9C3ZZ DRAINAGE OF PELVIC REGION SUBCUTANEOUS TISSUE AND FASCIA, PERCUTANEOUS APPROACH: ICD-10-PCS | Performed by: SURGERY

## 2025-05-01 PROCEDURE — 360N000076 HC SURGERY LEVEL 3, PER MIN: Performed by: SURGERY

## 2025-05-01 PROCEDURE — 99222 1ST HOSP IP/OBS MODERATE 55: CPT | Performed by: INTERNAL MEDICINE

## 2025-05-01 PROCEDURE — 250N000025 HC SEVOFLURANE, PER MIN: Performed by: SURGERY

## 2025-05-01 PROCEDURE — 250N000009 HC RX 250: Performed by: ANESTHESIOLOGY

## 2025-05-01 PROCEDURE — 99231 SBSQ HOSP IP/OBS SF/LOW 25: CPT | Mod: 24

## 2025-05-01 PROCEDURE — 250N000011 HC RX IP 250 OP 636

## 2025-05-01 PROCEDURE — 710N000009 HC RECOVERY PHASE 1, LEVEL 1, PER MIN: Performed by: SURGERY

## 2025-05-01 PROCEDURE — 85027 COMPLETE CBC AUTOMATED: CPT | Performed by: INTERNAL MEDICINE

## 2025-05-01 PROCEDURE — 120N000001 HC R&B MED SURG/OB

## 2025-05-01 PROCEDURE — 250N000011 HC RX IP 250 OP 636: Performed by: INTERNAL MEDICINE

## 2025-05-01 PROCEDURE — 258N000001 HC RX 258: Performed by: SURGERY

## 2025-05-01 PROCEDURE — 250N000011 HC RX IP 250 OP 636: Mod: JZ

## 2025-05-01 PROCEDURE — 250N000009 HC RX 250: Performed by: SURGERY

## 2025-05-01 PROCEDURE — 99232 SBSQ HOSP IP/OBS MODERATE 35: CPT | Performed by: INTERNAL MEDICINE

## 2025-05-01 PROCEDURE — 999N000141 HC STATISTIC PRE-PROCEDURE NURSING ASSESSMENT: Performed by: SURGERY

## 2025-05-01 RX ORDER — SODIUM CHLORIDE, SODIUM LACTATE, POTASSIUM CHLORIDE, CALCIUM CHLORIDE 600; 310; 30; 20 MG/100ML; MG/100ML; MG/100ML; MG/100ML
INJECTION, SOLUTION INTRAVENOUS CONTINUOUS
Status: DISCONTINUED | OUTPATIENT
Start: 2025-05-01 | End: 2025-05-01 | Stop reason: HOSPADM

## 2025-05-01 RX ORDER — ONDANSETRON 2 MG/ML
INJECTION INTRAMUSCULAR; INTRAVENOUS PRN
Status: DISCONTINUED | OUTPATIENT
Start: 2025-05-01 | End: 2025-05-01

## 2025-05-01 RX ORDER — NALOXONE HYDROCHLORIDE 1 MG/ML
0.1 INJECTION INTRAMUSCULAR; INTRAVENOUS; SUBCUTANEOUS
Status: DISCONTINUED | OUTPATIENT
Start: 2025-05-01 | End: 2025-05-01 | Stop reason: HOSPADM

## 2025-05-01 RX ORDER — PROPOFOL 10 MG/ML
INJECTION, EMULSION INTRAVENOUS PRN
Status: DISCONTINUED | OUTPATIENT
Start: 2025-05-01 | End: 2025-05-01

## 2025-05-01 RX ORDER — ENOXAPARIN SODIUM 100 MG/ML
40 INJECTION SUBCUTANEOUS EVERY 24 HOURS
Status: DISCONTINUED | OUTPATIENT
Start: 2025-05-02 | End: 2025-05-09

## 2025-05-01 RX ORDER — POTASSIUM CHLORIDE 1500 MG/1
20 TABLET, EXTENDED RELEASE ORAL ONCE
Status: COMPLETED | OUTPATIENT
Start: 2025-05-01 | End: 2025-05-01

## 2025-05-01 RX ORDER — LIDOCAINE HYDROCHLORIDE 10 MG/ML
INJECTION, SOLUTION INFILTRATION; PERINEURAL PRN
Status: DISCONTINUED | OUTPATIENT
Start: 2025-05-01 | End: 2025-05-01

## 2025-05-01 RX ORDER — LIDOCAINE 40 MG/G
CREAM TOPICAL
Status: DISCONTINUED | OUTPATIENT
Start: 2025-05-01 | End: 2025-05-09

## 2025-05-01 RX ORDER — HYDROCODONE BITARTRATE AND ACETAMINOPHEN 5; 325 MG/1; MG/1
2 TABLET ORAL EVERY 6 HOURS PRN
Status: DISCONTINUED | OUTPATIENT
Start: 2025-05-01 | End: 2025-05-02

## 2025-05-01 RX ORDER — ONDANSETRON 2 MG/ML
4 INJECTION INTRAMUSCULAR; INTRAVENOUS EVERY 30 MIN PRN
Status: DISCONTINUED | OUTPATIENT
Start: 2025-05-01 | End: 2025-05-01 | Stop reason: HOSPADM

## 2025-05-01 RX ORDER — LIDOCAINE 40 MG/G
CREAM TOPICAL
Status: DISCONTINUED | OUTPATIENT
Start: 2025-05-01 | End: 2025-05-01 | Stop reason: HOSPADM

## 2025-05-01 RX ORDER — FENTANYL CITRATE 50 UG/ML
50 INJECTION, SOLUTION INTRAMUSCULAR; INTRAVENOUS EVERY 5 MIN PRN
Status: DISCONTINUED | OUTPATIENT
Start: 2025-05-01 | End: 2025-05-01 | Stop reason: HOSPADM

## 2025-05-01 RX ORDER — DEXAMETHASONE SODIUM PHOSPHATE 4 MG/ML
4 INJECTION, SOLUTION INTRA-ARTICULAR; INTRALESIONAL; INTRAMUSCULAR; INTRAVENOUS; SOFT TISSUE
Status: DISCONTINUED | OUTPATIENT
Start: 2025-05-01 | End: 2025-05-01 | Stop reason: HOSPADM

## 2025-05-01 RX ORDER — BUPIVACAINE HCL/EPINEPHRINE 0.25-.0005
VIAL (ML) INJECTION PRN
Status: DISCONTINUED | OUTPATIENT
Start: 2025-05-01 | End: 2025-05-01 | Stop reason: HOSPADM

## 2025-05-01 RX ORDER — LABETALOL HYDROCHLORIDE 5 MG/ML
INJECTION, SOLUTION INTRAVENOUS PRN
Status: DISCONTINUED | OUTPATIENT
Start: 2025-05-01 | End: 2025-05-01

## 2025-05-01 RX ORDER — NALOXONE HYDROCHLORIDE 0.4 MG/ML
0.1 INJECTION, SOLUTION INTRAMUSCULAR; INTRAVENOUS; SUBCUTANEOUS
Status: DISCONTINUED | OUTPATIENT
Start: 2025-05-01 | End: 2025-05-01 | Stop reason: HOSPADM

## 2025-05-01 RX ORDER — ONDANSETRON 4 MG/1
4 TABLET, ORALLY DISINTEGRATING ORAL EVERY 30 MIN PRN
Status: DISCONTINUED | OUTPATIENT
Start: 2025-05-01 | End: 2025-05-01 | Stop reason: HOSPADM

## 2025-05-01 RX ORDER — BISACODYL 10 MG
10 SUPPOSITORY, RECTAL RECTAL DAILY PRN
Status: DISCONTINUED | OUTPATIENT
Start: 2025-05-04 | End: 2025-05-02

## 2025-05-01 RX ORDER — HYDROCODONE BITARTRATE AND ACETAMINOPHEN 5; 325 MG/1; MG/1
1 TABLET ORAL EVERY 6 HOURS PRN
Status: DISCONTINUED | OUTPATIENT
Start: 2025-05-01 | End: 2025-05-02

## 2025-05-01 RX ORDER — FENTANYL CITRATE 50 UG/ML
INJECTION, SOLUTION INTRAMUSCULAR; INTRAVENOUS PRN
Status: DISCONTINUED | OUTPATIENT
Start: 2025-05-01 | End: 2025-05-01

## 2025-05-01 RX ORDER — CEFAZOLIN SODIUM/WATER 2 G/20 ML
2 SYRINGE (ML) INTRAVENOUS
Status: COMPLETED | OUTPATIENT
Start: 2025-05-01 | End: 2025-05-01

## 2025-05-01 RX ORDER — CEFAZOLIN SODIUM/WATER 2 G/20 ML
2 SYRINGE (ML) INTRAVENOUS SEE ADMIN INSTRUCTIONS
Status: DISCONTINUED | OUTPATIENT
Start: 2025-05-01 | End: 2025-05-01 | Stop reason: HOSPADM

## 2025-05-01 RX ORDER — FENTANYL CITRATE 50 UG/ML
25 INJECTION, SOLUTION INTRAMUSCULAR; INTRAVENOUS EVERY 5 MIN PRN
Status: DISCONTINUED | OUTPATIENT
Start: 2025-05-01 | End: 2025-05-01 | Stop reason: HOSPADM

## 2025-05-01 RX ORDER — DEXAMETHASONE SODIUM PHOSPHATE 10 MG/ML
INJECTION, SOLUTION INTRAMUSCULAR; INTRAVENOUS PRN
Status: DISCONTINUED | OUTPATIENT
Start: 2025-05-01 | End: 2025-05-01

## 2025-05-01 RX ADMIN — SODIUM CHLORIDE, SODIUM LACTATE, POTASSIUM CHLORIDE, AND CALCIUM CHLORIDE: .6; .31; .03; .02 INJECTION, SOLUTION INTRAVENOUS at 11:44

## 2025-05-01 RX ADMIN — Medication 2 G: at 12:04

## 2025-05-01 RX ADMIN — PIPERACILLIN AND TAZOBACTAM 3.38 G: 3; .375 INJECTION, POWDER, FOR SOLUTION INTRAVENOUS at 05:28

## 2025-05-01 RX ADMIN — PROPOFOL 150 MG: 10 INJECTION, EMULSION INTRAVENOUS at 12:17

## 2025-05-01 RX ADMIN — POTASSIUM CHLORIDE 20 MEQ: 1500 TABLET, EXTENDED RELEASE ORAL at 20:08

## 2025-05-01 RX ADMIN — Medication 200 MG: at 13:05

## 2025-05-01 RX ADMIN — KETOROLAC TROMETHAMINE 15 MG: 15 INJECTION, SOLUTION INTRAMUSCULAR; INTRAVENOUS at 21:09

## 2025-05-01 RX ADMIN — DEXAMETHASONE SODIUM PHOSPHATE 10 MG: 10 INJECTION, SOLUTION INTRAMUSCULAR; INTRAVENOUS at 12:30

## 2025-05-01 RX ADMIN — LEVOTHYROXINE SODIUM 75 MCG: 0.03 TABLET ORAL at 05:32

## 2025-05-01 RX ADMIN — FENTANYL CITRATE 25 MCG: 50 INJECTION, SOLUTION INTRAMUSCULAR; INTRAVENOUS at 13:38

## 2025-05-01 RX ADMIN — PROPOFOL 50 MG: 10 INJECTION, EMULSION INTRAVENOUS at 12:20

## 2025-05-01 RX ADMIN — PIPERACILLIN AND TAZOBACTAM 3.38 G: 3; .375 INJECTION, POWDER, FOR SOLUTION INTRAVENOUS at 17:43

## 2025-05-01 RX ADMIN — LIDOCAINE HYDROCHLORIDE 100 MG: 10 INJECTION, SOLUTION INFILTRATION; PERINEURAL at 12:17

## 2025-05-01 RX ADMIN — FENTANYL CITRATE 100 MCG: 50 INJECTION, SOLUTION INTRAMUSCULAR; INTRAVENOUS at 12:17

## 2025-05-01 RX ADMIN — ACETAMINOPHEN 650 MG: 325 TABLET ORAL at 07:58

## 2025-05-01 RX ADMIN — POTASSIUM CHLORIDE 20 MEQ: 1500 TABLET, EXTENDED RELEASE ORAL at 11:41

## 2025-05-01 RX ADMIN — HYDROMORPHONE HYDROCHLORIDE 1 MG: 1 INJECTION, SOLUTION INTRAMUSCULAR; INTRAVENOUS; SUBCUTANEOUS at 12:43

## 2025-05-01 RX ADMIN — LABETALOL HYDROCHLORIDE 10 MG: 5 INJECTION, SOLUTION INTRAVENOUS at 12:45

## 2025-05-01 RX ADMIN — PIPERACILLIN AND TAZOBACTAM 3.38 G: 3; .375 INJECTION, POWDER, FOR SOLUTION INTRAVENOUS at 10:10

## 2025-05-01 RX ADMIN — PIPERACILLIN AND TAZOBACTAM 3.38 G: 3; .375 INJECTION, POWDER, FOR SOLUTION INTRAVENOUS at 22:31

## 2025-05-01 RX ADMIN — ROCURONIUM 50 MG: 50 INJECTION, SOLUTION INTRAVENOUS at 12:18

## 2025-05-01 RX ADMIN — ONDANSETRON 4 MG: 2 INJECTION INTRAMUSCULAR; INTRAVENOUS at 13:03

## 2025-05-01 RX ADMIN — PHENYLEPHRINE HYDROCHLORIDE 100 MCG: 10 INJECTION INTRAVENOUS at 12:30

## 2025-05-01 ASSESSMENT — ACTIVITIES OF DAILY LIVING (ADL)
ADLS_ACUITY_SCORE: 37

## 2025-05-01 ASSESSMENT — ENCOUNTER SYMPTOMS: ORTHOPNEA: 0

## 2025-05-01 NOTE — ANESTHESIA POSTPROCEDURE EVALUATION
Patient: Michelle Isaac    Procedure: Procedure(s):  LAPAROSCOPIC LAVAGE; DRAINAGE INTRA-ABDOMINAL ABSCESS       Anesthesia Type:  General    Note:  Disposition: Outpatient   Postop Pain Control: Uneventful            Sign Out: Well controlled pain   PONV: No   Neuro/Psych: Uneventful            Sign Out: Acceptable/Baseline neuro status   Airway/Respiratory: Uneventful            Sign Out: Acceptable/Baseline resp. status   CV/Hemodynamics: Uneventful            Sign Out: Acceptable CV status; No obvious hypovolemia; No obvious fluid overload   Other NRE: NONE   DID A NON-ROUTINE EVENT OCCUR? No           Last vitals:  Vitals Value Taken Time   /77 05/01/25 1400   Temp 37.1  C (98.7  F) 05/01/25 1315   Pulse 78 05/01/25 1408   Resp 24 05/01/25 1406   SpO2 97 % 05/01/25 1408   Vitals shown include unfiled device data.    Electronically Signed By: Jeremy Amin MD  May 1, 2025  4:40 PM

## 2025-05-01 NOTE — PLAN OF CARE
Goal Outcome Evaluation:    Report given to EPI; pt just went over to EPI for her scheduled surgery. CHG wipes completed prior to transfer. Pt's  present at time of transfer.    Problem: Adult Inpatient Plan of Care  Goal: Optimal Comfort and Wellbeing  Outcome: Progressing  Intervention: Monitor Pain and Promote Comfort  Recent Flowsheet Documentation  Taken 5/1/2025 0801 by Solange Gillespie RN  Pain Management Interventions:   medication (see MAR)   cold applied  Pain relief achieved with PRN tylenol and ice this morning.

## 2025-05-01 NOTE — CONSULTS
ID staff.  I personally examined pt.  I have reviewed this note.  Lap washout today is planned.  I'm good with zosyn at this time and will continue to follow.     SHERRI Frausto MD        [facility]    Infectious Disease Consultation     Date of Admission:  4/27/2025  Date of Consult (When I saw the patient): 05/01/25    Assessment & Plan     Michelle Isaac is a 70 year old female admitted on 4/27/2025. She has PMH most notable for HLD, hypothyroidism that presents with abdominal pain and had findings concerning for perforated diverticulitis     ASSESSMENT:    Sigmoid colon diverticulitis with perforation  New gas and fluid collections in the pelvis compatible with abscess formation    PLAN:    laparoscopic lavage with drain placement by surgery today  Continue  zosyn 3.375g IV Q6H   Monitor CBC, CMP, post surgery results  ID will follow    Jurgen Holloway MS    Reason for Consult     Reason for consult: I was asked to evaluate this patient for Diverticulitis on antibiotics for several days but worsening with no CAT scan reporting new abscess formation.  evaluate for antibiotic management.    Primary Care Physician   [place]    Chief Complaint     Abdominal pain    History is obtained from the patient and medical records    History of Present Illness   Michelle Isaac is a 70 year old female who has PMH most notable for HLD, hypothyroidism that presents with abdominal pain.     The patient underwent a surgery to repair her right gluteal tendon on 04/22.  She did not have a bowel movement for several days after this.  She felt acutely constipated on 04/24 and then had severe abdominal pain starting on 04/25.  She was discharged after her surgery with Vicodin and this did not help with her abdominal pain.  She has tried multiple stool softeners and MiraLAX without success. She denies any fever or vomiting. She denies any urinary symptoms.  She went to an urgent care and was found  "to have findings concerning for perforated diverticulitis on CT abdomen pelvis. CT scan performed showed some mild pneumoperitoneum likely from a perforated diverticulitis to the distal sigmoid colon.     Zosyn was started after evaluation and discussion with patien     In the ER the patient was afebrile, HR 100s, RR 20, /70s and on RA.  Lactic acid 1.4, WBC 12.2.  She received 500 mL normal saline, IV Dilaudid and vancomycin.     Afebrile  Wbc count normal    Currently on:  Zosyn 3.375g IV Q6H    Cr 0.89    She is doing \"okay\"  Still has pain in lower abdomen, which worsens when urinating/ passing stool  Was on a liquid diet, now NPO for surgery  Pain is little better  Still has diarrhea  No fever/ chills/ shivering    O/E:  GENERAL APPEARANCE:  awake, alert, oriented  EYES: Eyes grossly normal to inspection, conjunctivae and sclerae normal  NECK: supple  RESP: normal breathing pattern  CV: regular rates and rhythm, S1 S2  LYMPHATICS: no swelling  ABDOMEN: tender lower abdomen  NEURO: coherent    -Sigmoid colon diverticulitis with perforation  laparoscopic lavage with drain placement     -Recent RLE tendon repair and trochanteric bursectomy 4/22/25  CT shows fluid collection.  Previous hospitalist discussed with Ortho PA, not concerned for abscess  Continue activity level as instructed by orthopedic team     -Acute blood loss anemia  Likely related to recent surgery and sepsis, hemodilution  Monitor CBC intermittently    CT Abdomen Pelvis w Contrast  Result Date: 4/30/2025   IMPRESSION: 1.  New gas and fluid collections in the pelvis compatible with abscess formation. 2.  Persistent pneumoperitoneum. 3.  Increasing rectosigmoid wall thickening. 4.  Persistent complex gas and fluid collection overlying the right hip.    CT ABDOMEN PELVIS WO  Result Date: 4/27/2025  1.  Findings worrisome for diverticulitis involving the sigmoid colon with no drainable abscess. There is some associated mild intraperitoneal free " gas. 2.  Changes worrisome for an abscess in the subcutaneous fat lateral to the lower right greater trochanteric region. 3.  3.1 mm noncalcified pulmonary nodule in the right lower lobe which has been stable dating back to 3/30/2017, thus should be benign with no follow-up recommended. 4.  The intraperitoneal free gas finding was called to provider Ruben at approximately 9:00 PM central standard time 4/27/2025.    Past Medical History   I have reviewed this patient's medical history and updated it with pertinent information if needed.   Past Medical History:   Diagnosis Date    Thyroid disease        Past Surgical History   I have reviewed this patient's surgical history and updated it with pertinent information if needed.  Past Surgical History:   Procedure Laterality Date    ABDUCTOR REPAIR, HIP Right 4/22/2025    Procedure: OPEN RIGHT HIP GLUTEUS MEDIUS TENDON REPAIR,;  Surgeon: Kaleb Issa MD;  Location: [facility]    BURSECTOMY HIP Left 9/13/2024    Procedure: TROCHANTERIC BURSECTOMY;  Surgeon: Kaleb Issa MD;  Location: [facility]    BURSECTOMY HIP Right 4/22/2025    Procedure: TROCHANTERIC BURSECTOMY RIGHT;  Surgeon: Kaleb Issa MD;  Location[facility]    REPAIR TENDON HAMSTRING Left 9/13/2024    Procedure: OPEN LEFT HIP GLUTEUS MEDIUS TENDON REPAIR WITH ILIOTIBIAL BAND LENGTHENING;  Surgeon: Kaleb Issa MD;  Location: [facility]       Prior to Admission Medications   Prior to Admission Medications   Prescriptions Last Dose Informant Patient Reported? Taking?   Ginkgo Biloba (GINKOBA) 40 MG TABS 4/27/2025 Morning Self Yes Yes   Sig: Take 40 mg by mouth daily.   Glucosamine Sulfate 500 MG TABS 4/27/2025 Morning Self Yes Yes   Sig: Take 500 mg by mouth daily.   HYDROcodone-acetaminophen (NORCO) 5-325 MG tablet  Self No Yes   Sig: Take 1-2 tablets by mouth every 4 hours as needed for moderate to severe pain.   TURMERIC PO 4/27/2025 Morning Self Yes Yes  "  Sig: Take 2 tablets by mouth daily.   Vitamin E 90 MG (200 UNIT) capsule 4/27/2025 Morning Self Yes Yes   Sig: Take 200 Units by mouth daily.   acetaminophen (ARTHRITIS PAIN APAP) 650 MG CR tablet  Self Yes Yes   Sig: Take 1,300 mg by mouth 3 times daily as needed for mild pain or fever.   aspirin 81 MG EC tablet 4/27/2025 Morning Self No Yes   Sig: Take 1 tablet (81 mg) by mouth 2 times daily.   atorvastatin (LIPITOR) 20 MG tablet 4/26/2025 Bedtime Self Yes Yes   Sig: Take 1 tablet by mouth at bedtime.   calcium carbonate (OS-ZACHARIAH) 500 MG tablet 4/27/2025 Morning Self Yes Yes   Sig: Take 1 tablet by mouth daily.   celecoxib (CELEBREX) 100 MG capsule 4/27/2025 Morning Self No Yes   Sig: Take 1 capsule (100 mg) by mouth 2 times daily.   fish oil-omega-3 fatty acids 1000 MG capsule 4/27/2025 Morning Self Yes Yes   Sig: Take 2 g by mouth daily.   levothyroxine (SYNTHROID/LEVOTHROID) 75 MCG tablet 4/27/2025 Morning Self Yes Yes   Sig: Take 75 mcg by mouth daily.   multivitamin w/minerals (THERA-VIT-M) tablet 4/27/2025 Morning Self Yes Yes   Sig: Take 1 tablet by mouth daily.   ondansetron (ZOFRAN ODT) 4 MG ODT tab  Self No Yes   Sig: Take 1 tablet (4 mg) by mouth every 8 hours as needed for nausea.   senna-docusate (SENOKOT-S/PERICOLACE) 8.6-50 MG tablet 4/27/2025 Morning Self No Yes   Sig: Take 1-2 tablets by mouth 2 times daily.   vitamin B-12 (CYANOCOBALAMIN) 1000 MCG tablet 4/27/2025 Morning Self Yes Yes   Sig: Take 1,000 mcg by mouth daily.   vitamin D3 (CHOLECALCIFEROL) 50 mcg (2000 units) tablet 4/27/2025 Morning Self Yes Yes   Sig: Take 1 tablet by mouth daily.      Facility-Administered Medications: None     Allergies   Allergies   Allergen Reactions    Ciprofloxacin Hives and Rash     \"Many years ago\"    Oxycodone Other (See Comments)     Does NOT like-makes her feel weird, dizzy, weak       Immunization History   Immunization History   Administered Date(s) Administered    COVID-19 Bivalent 12+ (Pfizer) " 11/17/2022    COVID-19 MONOVALENT 12+ (Pfizer) 03/09/2021, 09/30/2021       Social History   I have reviewed this patient's social history and updated it with pertinent information if needed. Michelle Isaac  reports that she has never smoked. She has never used smokeless tobacco. She reports that she does not currently use alcohol. She reports that she does not use drugs.    Family History   I have reviewed this patient's family history and updated it with pertinent information if needed.   History reviewed. No pertinent family history.    Review of Systems   The 10 point Review of Systems is negative other than noted in the HPI or here.     Physical Exam   Temp: 98.8  F (37.1  C) Temp src: Oral BP: 115/63 Pulse: 69   Resp: 18 SpO2: 97 % O2 Device: None (Room air)    Vital Signs with Ranges  Temp:  [98.5  F (36.9  C)-99.6  F (37.6  C)] 98.8  F (37.1  C)  Pulse:  [68-80] 69  Resp:  [18] 18  BP: (115-156)/(63-70) 115/63  SpO2:  [95 %-98 %] 97 %  167 lbs 15.85 oz  Body mass index is 28.39 kg/m .    GENERAL APPEARANCE:  awake, NAD  EYES: Eyes grossly normal to inspection, conjunctivae and sclerae normal  HENT: mouth without ulcers or lesions  NECK: supple  RESP: normal breathing pattern  CV: regular rates and rhythm, S1 S2  LYMPHATICS: no swelling  ABDOMEN: soft, nontender, nondistended  MS: extremities normal- no gross deformities noted  SKIN: no suspicious lesions or rashes  NEURO: coherent      LABORATORY DATA:  Reviewed    CBC RESULTS:   Recent Labs   Lab Test 05/01/25  0713   WBC 9.1   RBC 3.69*   HGB 10.4*   HCT 33.2*   MCV 90   MCH 28.2   MCHC 31.3*   RDW 13.0           Last Comprehensive Metabolic Panel:  Sodium   Date Value Ref Range Status   05/01/2025 140 135 - 145 mmol/L Final     Potassium   Date Value Ref Range Status   05/01/2025 3.3 (L) 3.4 - 5.3 mmol/L Final     Chloride   Date Value Ref Range Status   05/01/2025 103 98 - 107 mmol/L Final     Carbon Dioxide (CO2)   Date Value Ref Range Status  "  05/01/2025 22 22 - 29 mmol/L Final     Anion Gap   Date Value Ref Range Status   05/01/2025 15 7 - 15 mmol/L Final     Glucose   Date Value Ref Range Status   05/01/2025 77 70 - 99 mg/dL Final     Urea Nitrogen   Date Value Ref Range Status   05/01/2025 12.0 8.0 - 23.0 mg/dL Final     Creatinine   Date Value Ref Range Status   05/01/2025 0.89 0.51 - 0.95 mg/dL Final     GFR Estimate   Date Value Ref Range Status   05/01/2025 69 >60 mL/min/1.73m2 Final     Comment:     eGFR calculated using 2021 CKD-EPI equation.     Calcium   Date Value Ref Range Status   05/01/2025 8.9 8.8 - 10.4 mg/dL Final     Bilirubin Total   Date Value Ref Range Status   05/01/2025 0.4 <=1.2 mg/dL Final     Alkaline Phosphatase   Date Value Ref Range Status   05/01/2025 350 (H) 40 - 150 U/L Final     ALT   Date Value Ref Range Status   05/01/2025 65 (H) 0 - 50 U/L Final     AST   Date Value Ref Range Status   05/01/2025 23 0 - 45 U/L Final             No results found for: \"CRP\"         MICROBIOLOGY:    Reviewed    Blood cultures  Other Micro:    RADIOLOGY:    CT Abdomen Pelvis w Contrast    Result Date: 4/30/2025  EXAM: CT ABDOMEN PELVIS W CONTRAST LOCATION: [facility] DATE: 4/30/2025 INDICATION: reevaluate colonic perforation. COMPARISON: 4/27/2025 TECHNIQUE: CT scan of the abdomen and pelvis was performed following injection of IV contrast. Multiplanar reformats were obtained. Dose reduction techniques were used. CONTRAST: 82ml isovue 370 FINDINGS: LOWER CHEST: No basilar infiltrates. Small amount of pneumoperitoneum extends into a small hiatal hernia. HEPATOBILIARY: No biliary dilatation. PANCREAS: Unremarkable SPLEEN: Unremarkable ADRENAL GLANDS: Unremarkable KIDNEYS/BLADDER: Bilateral heterogeneous renal enhancement without perinephric stranding. Decompressed bladder. BOWEL: Moderate volume of pneumoperitoneum redemonstrated. Long segment wall thickening in the rectosigmoid, increased. Numerous associated " diverticula. Scattered colonic air-fluid levels. No small bowel obstruction. New multilobulated fluid collections in the posterior pelvis and left pelvic sidewall. Dominant pelvic collection measures approximately 7.8 x 4.0 cm, image 172 series 3. Smaller collections along the left pelvic sidewall measure between 2.9 and 1.8 cm, image 178. 3.5 cm collection along the right pelvic sidewall also noted. LYMPH NODES: No significant retroperitoneal adenopathy. VASCULATURE: No abdominal aortic aneurysm. Patent portal vein. PELVIC ORGANS: Small uterine fibroid. MUSCULOSKELETAL: No acute bony abnormalities. Multilevel spondylosis and moderate facet arthropathies. Small fat-containing umbilical hernia. Gas and fluid collection superficial to the proximal left femur measures up to 5.0 cm, image 204. Adjacent fluid  and gas tracks along the upper right thigh musculature. Overall appearances are similar to prior exam given differences in technique.      IMPRESSION: 1.  New gas and fluid collections in the pelvis compatible with abscess formation. 2.  Persistent pneumoperitoneum. 3.  Increasing rectosigmoid wall thickening. 4.  Persistent complex gas and fluid collection overlying the right hip.    CT External Imaging Abdomen    Result Date: 4/27/2025  Images were obtained from an external facility.  Click PACS Images hyperlink to view images.  Textual results have been scanned into the media tab.    CT ABDOMEN PELVIS WO    Result Date: 4/27/2025  For Patients: As a result of the 21st Century Cures Act, medical imaging exams and procedure reports are released immediately into your electronic medical record. You may view this report before your referring provider. If you have questions, please contact your health care provider. EXAM: CT ABDOMEN PELVIS WO LOCATION: [facility] DATE: 4/27/2025 INDICATION: Lower abdominal pain. COMPARISON: None. TECHNIQUE: CT scan of the abdomen and pelvis was performed without  IV contrast. Multiplanar reformats were obtained. Dose reduction techniques were used. CONTRAST: None. FINDINGS: LOWER CHEST: Small esophageal hiatal hernia. 3.1 mm noncalcified pulmonary nodule in the right lower lobe. This has been stable since prior CT 11/17/2023 and CT 3/30/2017, thus given interval stability this should be benign with no follow-up recommended. HEPATOBILIARY: Normal. PANCREAS: Normal. SPLEEN: Normal. ADRENAL GLANDS: Normal. KIDNEYS/BLADDER: Normal. BOWEL: There is a mild amount of free intraperitoneal gas seen with the origin of the gas likely from the distal sigmoid colon which shows some adjacent stranding of the fat, gas, and minimal free appearing fluid. These findings are likely secondary to sequelae of diverticulitis. No drainable abscess identified. Mild diverticulosis most marked in the sigmoid colon. Small esophageal hiatal hernia. The bowel is otherwise normal to include the appendix. LYMPH NODES: Normal. VASCULATURE: Mild calcification with no aneurysm. PELVIC ORGANS: Normal. MUSCULOSKELETAL: In the subcutaneous fat lateral to the greater trochanteric region of the right hip there is a partially seen gas/fluid collection with the visualized portion measuring approximately 1.6 x 2.1 x 2.8 cm with stranding in the adjacent fat. Underlying changes of inflammation/abscess formation suggested. No destructive changes to suggest osteomyelitis. Presumed prior postoperative changes seen in both hips. Advanced multilevel degenerative changes in the spine.    1.  Findings worrisome for diverticulitis involving the sigmoid colon with no drainable abscess. There is some associated mild intraperitoneal free gas. 2.  Changes worrisome for an abscess in the subcutaneous fat lateral to the lower right greater trochanteric region. 3.  3.1 mm noncalcified pulmonary nodule in the right lower lobe which has been stable dating back to 3/30/2017, thus should be benign with no follow-up recommended. 4.  The  intraperitoneal free gas finding was called to provider Ruben at approximately 9:00 PM central standard time 4/27/2025.    MA Screening Bilateral w/ Babak    Result Date: 4/11/2025  EXAM: MAMMOGRAM SCREENING BABAK BILATERAL LOCATION: [facility] DATE: 4/10/2025 INDICATION: Asymptomatic. Screening Mammogram. COMPARISON: 4/4/24, 4/3/23 BREAST DENSITY: The breasts are heterogeneously dense, which may obscure small masses. FINDINGS: Tomosynthesis craniocaudal and mediolateral oblique views were obtained. No concerning mammographic findings.    IMPRESSION: ACR BI-RADS 1: Negative Recommended follow-up: Annual Mammography beginning at age 40 or as discussed with your provider. When performed, computer-aided detection was used in the interpretation of this study. A lay language report of this examination will be mailed to the patient. LIFETIME BREAST CANCER RISK ASSESSMENT SCORE: Lifetime risk of developing breast cancer is 6.3% calculated using the Carla Model and information provided by the patient at the time of screening. The average lifetime risk for developing breast cancer is 12.9% for women born in the . For patients with a lifetime breast cancer risk assessment score of less than 20%, annual screening mammography is recommended. For patients with a lifetime risk of greater than 20%, annual screening mammography supplemented with annual Breast MRI is recommended. Patients in this category are encouraged to discuss this recommendation with their healthcare provider to determine if Breast MRI is appropriate and if so, to obtain a referral and confirm coverage with their health insurance. Recommendations are based on the American College of Radiology Appropriateness Criteria. Patients with a BI-RADS category of 0 should follow the recommendations for further evaluation before considering supplemental screening.       Medical Decision Making

## 2025-05-01 NOTE — PROVIDER NOTIFICATION
Notified  (anesthesia) regarding AM potassium 3.3.     Response: One time dose potasium chloride 20 mEq.

## 2025-05-01 NOTE — PLAN OF CARE
Problem: Adult Inpatient Plan of Care  Goal: Absence of Hospital-Acquired Illness or Injury  Outcome: Progressing  Intervention: Prevent Skin Injury  Recent Flowsheet Documentation  Taken 4/30/2025 1634 by Eli Friedman RN  Body Position: sitting up in bed     Problem: Adult Inpatient Plan of Care  Goal: Optimal Comfort and Wellbeing  Outcome: Progressing     Problem: Pain Acute  Goal: Optimal Pain Control and Function  Outcome: Progressing   Goal Outcome Evaluation:  Pt has been alert and coherent. Scheduled iv abx given. PRN Tylenol given for pain with good relief. On scheduled Laparoscopic lavage tomorrow. Allowed to verbalized concerns and acknowledge feelings. Spouse at bedside and supportive.

## 2025-05-01 NOTE — PROGRESS NOTES
General Surgery Brief Note    Patient seen this morning in preop.  We reviewed our plans for surgery for laparoscopic washout and drain placement.  There is always a very small possibility of finding a large perforation and needing a more definitive procedure but I think this is highly unlikely.  We discussed the risks of bleeding, infection, and injury to other structures such as intestine and bladder.  The patient consents to proceed.    Zohaib Velez MD  General Surgeon  [facility]  Surgery [address] [address]  Office: [phone]

## 2025-05-01 NOTE — PROGRESS NOTES
General Surgery Progress Note:    Hospital Day # 4    ASSESSMENT:     1. Gluteal tendinitis of right buttock    2. Diverticulitis of large intestine with perforation without abscess, unspecified bleeding status        Michelle Isaac is a 70 year old female here with several days of abdominal pain with imaging demonstrating sigmoid diverticulitis with moderate pneumoperitoneum.  Repeat CT 4/30 with increased inflammation, pelvic fluid collections, similar pneumoperitoneum.  No tachycardia or leukocytosis.  Patient is amenable to laparoscopic washout today    ** patient has brace from recent hip surgery and CANNOT abduct her right leg    PLAN:   -NPO  -Activity as tolerated  -Plan for OR today  -Multimodal pain management, postoperatively will likely switch to Vicodin patient has tolerated previously  -Medical management per primary team    SUBJECTIVE:   Michelle Isaac was seen on rounds.  States she is doing okay, this morning again she had a severe increase in pain but it was intermittent and is now decreased.  She does have an increase in pain when sitting on the toilet and when voiding and having any gas or bowel movement.  Otherwise generally the same as previous days.  Denies fever or chills, has noticed that her temperature has been slowly increasing    VITALS RANGE:  Temp:  [98.5  F (36.9  C)-99.6  F (37.6  C)] 99  F (37.2  C)  Pulse:  [68-80] 72  Resp:  [18] 18  BP: (115-156)/(63-71) 141/71  SpO2:  [95 %-98 %] 96 %    Physical Exam:  General: patient seen resting in bed in no acute distress  Resp: no increased work of breathing, breathing comfortably on room air  Abdomen: Generally soft and distended in the lower abdomen. Some tenderness with palpation over lower middle.  Extremities: No edema, cyanosis, or obvious deformities visualized on exam    Admission on 04/27/2025   Component Date Value    INR 04/27/2025 1.04     aPTT 04/27/2025 31     Lactic Acid, Initial 04/27/2025 1.4     WBC Count 04/27/2025  12.2 (H)     RBC Count 04/27/2025 4.30     Hemoglobin 04/27/2025 12.2     Hematocrit 04/27/2025 37.3     MCV 04/27/2025 87     MCH 04/27/2025 28.4     MCHC 04/27/2025 32.7     RDW 04/27/2025 12.9     Platelet Count 04/27/2025 330     % Neutrophils 04/27/2025 84     % Lymphocytes 04/27/2025 10     % Monocytes 04/27/2025 6     % Eosinophils 04/27/2025 0     % Basophils 04/27/2025 0     % Immature Granulocytes 04/27/2025 1     NRBCs per 100 WBC 04/27/2025 0     Absolute Neutrophils 04/27/2025 10.2 (H)     Absolute Lymphocytes 04/27/2025 1.2     Absolute Monocytes 04/27/2025 0.7     Absolute Eosinophils 04/27/2025 0.0     Absolute Basophils 04/27/2025 0.0     Absolute Immature Granul* 04/27/2025 0.1     Absolute NRBCs 04/27/2025 0.0     ABO/RH(D) 04/27/2025 A POS     Antibody Screen 04/27/2025 Negative     SPECIMEN EXPIRATION DATE 04/27/2025 [id number]     Hold Specimen 04/27/2025 JIC     Sodium 04/28/2025 136     Potassium 04/28/2025 3.7     Carbon Dioxide (CO2) 04/28/2025 25     Anion Gap 04/28/2025 8     Urea Nitrogen 04/28/2025 12.0     Creatinine 04/28/2025 0.70     GFR Estimate 04/28/2025 >90     Calcium 04/28/2025 8.2 (L)     Chloride 04/28/2025 103     Glucose 04/28/2025 123 (H)     Alkaline Phosphatase 04/28/2025 181 (H)     AST 04/28/2025 74 (H)     ALT 04/28/2025 147 (H)     Protein Total 04/28/2025 6.3 (L)     Albumin 04/28/2025 3.3 (L)     Bilirubin Total 04/28/2025 0.9     INR 04/28/2025 1.16 (H)     MRSA Target DNA 04/28/2025 Negative     SA Target DNA 04/28/2025 Positive     WBC Count 04/28/2025 10.7     RBC Count 04/28/2025 3.56 (L)     Hemoglobin 04/28/2025 10.0 (L)     Hematocrit 04/28/2025 31.5 (L)     MCV 04/28/2025 89     MCH 04/28/2025 28.1     MCHC 04/28/2025 31.7     RDW 04/28/2025 12.9     Platelet Count 04/28/2025 266     % Neutrophils 04/28/2025 79     % Lymphocytes 04/28/2025 13     % Monocytes 04/28/2025 6     % Eosinophils 04/28/2025 0     % Basophils 04/28/2025 0     % Immature  Granulocytes 04/28/2025 1     NRBCs per 100 WBC 04/28/2025 0     Absolute Neutrophils 04/28/2025 8.4 (H)     Absolute Lymphocytes 04/28/2025 1.4     Absolute Monocytes 04/28/2025 0.7     Absolute Eosinophils 04/28/2025 0.0     Absolute Basophils 04/28/2025 0.0     Absolute Immature Granul* 04/28/2025 0.1     Absolute NRBCs 04/28/2025 0.0     Sodium 04/29/2025 141     Potassium 04/29/2025 3.3 (L)     Chloride 04/29/2025 106     Carbon Dioxide (CO2) 04/29/2025 26     Anion Gap 04/29/2025 9     Urea Nitrogen 04/29/2025 13.1     Creatinine 04/29/2025 0.94     GFR Estimate 04/29/2025 64     Calcium 04/29/2025 8.5 (L)     Glucose 04/29/2025 103 (H)     WBC Count 04/29/2025 10.8     RBC Count 04/29/2025 3.63 (L)     Hemoglobin 04/29/2025 10.2 (L)     Hematocrit 04/29/2025 33.2 (L)     MCV 04/29/2025 92     MCH 04/29/2025 28.1     MCHC 04/29/2025 30.7 (L)     RDW 04/29/2025 13.1     Platelet Count 04/29/2025 296     % Neutrophils 04/29/2025 78     % Lymphocytes 04/29/2025 13     % Monocytes 04/29/2025 7     % Eosinophils 04/29/2025 1     % Basophils 04/29/2025 0     % Immature Granulocytes 04/29/2025 1     NRBCs per 100 WBC 04/29/2025 0     Absolute Neutrophils 04/29/2025 8.4 (H)     Absolute Lymphocytes 04/29/2025 1.4     Absolute Monocytes 04/29/2025 0.8     Absolute Eosinophils 04/29/2025 0.1     Absolute Basophils 04/29/2025 0.0     Absolute Immature Granul* 04/29/2025 0.1     Absolute NRBCs 04/29/2025 0.0     Sodium 04/30/2025 141     Potassium 04/30/2025 3.5     Chloride 04/30/2025 109 (H)     Carbon Dioxide (CO2) 04/30/2025 24     Anion Gap 04/30/2025 8     Urea Nitrogen 04/30/2025 12.7     Creatinine 04/30/2025 0.96 (H)     GFR Estimate 04/30/2025 63     Calcium 04/30/2025 8.6 (L)     Glucose 04/30/2025 100 (H)     WBC Count 04/30/2025 8.7     RBC Count 04/30/2025 3.20 (L)     Hemoglobin 04/30/2025 9.1 (L)     Hematocrit 04/30/2025 29.1 (L)     MCV 04/30/2025 91     MCH 04/30/2025 28.4     MCHC 04/30/2025 31.3 (L)      RDW 04/30/2025 13.2     Platelet Count 04/30/2025 280     % Neutrophils 04/30/2025 75     % Lymphocytes 04/30/2025 14     % Monocytes 04/30/2025 8     % Eosinophils 04/30/2025 2     % Basophils 04/30/2025 1     % Immature Granulocytes 04/30/2025 1     NRBCs per 100 WBC 04/30/2025 0     Absolute Neutrophils 04/30/2025 6.5     Absolute Lymphocytes 04/30/2025 1.2     Absolute Monocytes 04/30/2025 0.7     Absolute Eosinophils 04/30/2025 0.2     Absolute Basophils 04/30/2025 0.0     Absolute Immature Granul* 04/30/2025 0.1     Absolute NRBCs 04/30/2025 0.0     Magnesium 04/30/2025 2.3     Sodium 05/01/2025 140     Potassium 05/01/2025 3.3 (L)     Carbon Dioxide (CO2) 05/01/2025 22     Anion Gap 05/01/2025 15     Urea Nitrogen 05/01/2025 12.0     Creatinine 05/01/2025 0.89     GFR Estimate 05/01/2025 69     Calcium 05/01/2025 8.9     Chloride 05/01/2025 103     Glucose 05/01/2025 77     Alkaline Phosphatase 05/01/2025 350 (H)     AST 05/01/2025 23     ALT 05/01/2025 65 (H)     Protein Total 05/01/2025 6.6     Albumin 05/01/2025 3.5     Bilirubin Total 05/01/2025 0.4     WBC Count 05/01/2025 9.1     RBC Count 05/01/2025 3.69 (L)     Hemoglobin 05/01/2025 10.4 (L)     Hematocrit 05/01/2025 33.2 (L)     MCV 05/01/2025 90     MCH 05/01/2025 28.2     MCHC 05/01/2025 31.3 (L)     RDW 05/01/2025 13.0     Platelet Count 05/01/2025 375         Oanh Martinez PA-C  [facility]  [address]  [address]  [place] [address]  [facility] [phone]

## 2025-05-01 NOTE — OP NOTE
General Surgery Operative Note    Date of Surgery: 05/01/25      Surgeon: Zohaib Velez MD    Assistant: Oanh Martinez PA-C.  Her assistance was required for visualization during the case    Preoperative Diagnosis: Perforated diverticulitis with pelvic abscess    Postoperative Diagnosis: Perforated diverticulitis with pelvic abscess    Procedure:   1.  Laparoscopic lavage  2.  Drainage of abdominal abscess    EBL: 5 cc    Findings: 3 distinct purulent fluid collections were encountered and drained    Indications:  Patient is a 70-year-old woman has been admitted now for several days with a perforation of her sigmoid colon from diverticulitis.  She had a fair amount of pneumoperitoneum and initially I was concerned she would require surgery but she did improve clinically with antibiotics alone.  CT scan was performed yesterday that revealed some pelvic abscesses that need drainage.  After discussion the risk and benefits of laparoscopic lavage, the patient consented to the procedure    Details of operation:  The patient was brought to the operating room and laid on the table in the supine position.  General anesthesia was induced without incident.  The patient's abdomen was prepped and draped in the usual sterile fashion.  A timeout for safety was performed.    I began with an incision above the umbilicus and a Veress needle was introduced.  Pneumoperitoneum was established without incident.  I then placed 2 additional 5 mm ports in the left abdomen.  I first started by surveying the abdominal cavity.  All of the bowel looked pristine.  There was no evidence of any infection in the main part of the abdominal cavity.  I looked underneath the omentum and transverse colon and everything appeared healthy.  At this point we turned our attention to the pelvis.  There was inflamed colon going down into the pelvis that was adherent to the overlying uterus and pelvic sidewall.  We found a small plane on the right side  and were able to bluntly dissect using our suction  and get into an abscess pocket here.  We continued the dissection down along the right side and found another fluid collection closer to the peritoneal reflection.  I looked at the CT scan and could tell there was another abscess more on the left side was likely still in drain and so we turned our attention more to the left pelvis.  Ultimately were successful in identifying this cavity and draining it completely.  We copiously irrigated the pelvis.  The colon itself did not not have any clear ongoing perforation.  It was firm and indurated as expected.  A 19 Tristanian Brian drain was then brought in through one of the ports and laid into the pelvis.  It was secured using a 2-0 nylon stitch.  At this point the abdomen was desufflated and all the ports removed.  The skin was closed with 4-0 Monocryl suture and Dermabond as a dressing.  The patient tolerated the procedure well.  There were no immediately apparent complications.                  Zohaib Velez MD  General Surgeon  [facility]  Surgery [address] [address]?  Office: [phone]

## 2025-05-01 NOTE — ANESTHESIA PROCEDURE NOTES
Airway       Patient location during procedure: OR       Procedure Start/Stop Times: 5/1/2025 12:19 PM  Staff -        CRNA: Tameka Barcenas APRN CRNA       Performed By: CRNAIndications and Patient Condition       Indications for airway management: derik-procedural       Induction type:intravenous       Mask difficulty assessment: 1 - vent by mask    Final Airway Details       Final airway type: endotracheal airway       Successful airway: ETT - single  Endotracheal Airway Details        ETT size (mm): 7.0       Cuffed: yes       Successful intubation technique: direct laryngoscopy       DL Blade Type: MAC 3       Grade View of Cords: 2       Adjucts: stylet       Position: Right       Measured from: gums/teeth       Secured at (cm): 21       Bite block used: None    Post intubation assessment        Placement verified by: capnometry, equal breath sounds and chest rise        Number of attempts at approach: 2       Number of other approaches attempted: 0       Secured with: tape       Ease of procedure: easy       Dentition: Intact and Unchanged       Dental guard used and removed. Dental Guard Type: Standard White.    Medication(s) Administered   Medication Administration Time: 5/1/2025 12:19 PM    Additional Comments       Anterior airway, requiring 2 attempts successful by MD after reposition

## 2025-05-01 NOTE — PLAN OF CARE
"Goal Outcome Evaluation:    VSS, patient has good UOP, patient for laparoscopic lavage,. Patient denies pain                      Problem: Adult Inpatient Plan of Care  Goal: Plan of Care Review  Description: The Plan of Care Review/Shift note should be completed every shift.  The Outcome Evaluation is a brief statement about your assessment that the patient is improving, declining, or no change.  This information will be displayed automatically on your shiftnote.  Outcome: Progressing  Goal: Patient-Specific Goal (Individualized)  Description: You can add care plan individualizations to a care plan. Examples of Individualization might be:  \"Parent requests to be called daily at 9am for status\", \"I have a hard time hearing out of my right ear\", or \"Do not touch me to wake me up as it startlesme\".  Outcome: Progressing  Goal: Absence of Hospital-Acquired Illness or Injury  Outcome: Progressing  Intervention: Identify and Manage Fall Risk  Recent Flowsheet Documentation  Taken 5/1/2025 0000 by Cj Valderrama RN  Safety Promotion/Fall Prevention: nonskid shoes/slippers when out of bed  Intervention: Prevent Skin Injury  Recent Flowsheet Documentation  Taken 5/1/2025 0400 by Cj Valderrama RN  Body Position: weight shifting  Taken 5/1/2025 0200 by Cj Valderrama RN  Body Position: weight shifting  Taken 5/1/2025 0006 by Cj Valderrama RN  Body Position: position changed independently  Taken 5/1/2025 0000 by Cj Valderrama RN  Body Position: position changed independently  Intervention: Prevent and Manage VTE (Venous Thromboembolism) Risk  Recent Flowsheet Documentation  Taken 5/1/2025 0000 by Cj Valderrama RN  VTE Prevention/Management: SCDs off (sequential compression devices)  Goal: Optimal Comfort and Wellbeing  Outcome: Progressing  Intervention: Provide Person-Centered Care  Recent Flowsheet Documentation  Taken 5/1/2025 0000 by Cj Valderrama RN  Trust Relationship/Rapport:   care explained   " choices provided   empathic listening provided   questions answered   thoughts/feelings acknowledged  Goal: Readiness for Transition of Care  Outcome: Progressing

## 2025-05-01 NOTE — PLAN OF CARE
Pain tolerable. 2 ice packs applied to lower abdomen. Trying to avoid narcotics at all costs. Agreeable to Toradol IV. Pt. Asked if okay to have strawberry banana smoothie and surgery paged to make sure. Family at bedside. SCD's on. BEKA drain bright bloody red, and small amount in bulb. All surgical incisions intact. Patient asked if SCD's could be off to allow her to get up to the commode independently and I informed her of the risks and she said she would like to have them off. Pain controlled, mostly at BEKA site with transfers to commode. Otherwise tolerable at rest.

## 2025-05-01 NOTE — ANESTHESIA CARE TRANSFER NOTE
Patient: Michelle Isaac    Procedure: Procedure(s):  LAPAROSCOPIC LAVAGE; DRAINAGE INTRA-ABDOMINAL ABSCESS       Diagnosis: Diverticulitis of large intestine with perforation without abscess, unspecified bleeding status [K57.20]  Diagnosis Additional Information: No value filed.    Anesthesia Type:   General     Note:    Oropharynx: oropharynx clear of all foreign objects and spontaneously breathing  Level of Consciousness: awake  Oxygen Supplementation: face mask  Level of Supplemental Oxygen (L/min / FiO2): 6  Independent Airway: airway patency satisfactory and stable  Dentition: dentition unchanged  Vital Signs Stable: post-procedure vital signs reviewed and stable  Report to RN Given: handoff report given  Patient transferred to: PACU    Handoff Report: Identifed the Patient, Identified the Reponsible Provider, Reviewed the pertinent medical history, Discussed the surgical course, Reviewed Intra-OP anesthesia mangement and issues during anesthesia, Set expectations for post-procedure period and Allowed opportunity for questions and acknowledgement of understanding      Vitals:  Vitals Value Taken Time   /86 05/01/25 1315   Temp 37.1  C (98.7  F) 05/01/25 1315   Pulse 76 05/01/25 1324   Resp 19 05/01/25 1324   SpO2 100 % 05/01/25 1324   Vitals shown include unfiled device data.    Electronically Signed By: JAVIER Baca CRNA  May 1, 2025  1:26 PM

## 2025-05-01 NOTE — ANESTHESIA PREPROCEDURE EVALUATION
"Anesthesia Pre-Procedure Evaluation    Patient: Michelle Isaac   MRN: [id number] : 1955        Procedure : Procedure(s):  OPEN RIGHT HIP GLUTEUS MEDIUS TENDON REPAIR,  TROCHANTERIC BURSECTOMY          Past Medical History:   Diagnosis Date    Thyroid disease       Past Surgical History:   Procedure Laterality Date    ABDUCTOR REPAIR, HIP Right 2025    Procedure: OPEN RIGHT HIP GLUTEUS MEDIUS TENDON REPAIR,;  Surgeon: Kaleb Issa MD;  Location: [facility]    BURSECTOMY HIP Left 2024    Procedure: TROCHANTERIC BURSECTOMY;  Surgeon: Kaleb Issa MD;  Location: [facility]    BURSECTOMY HIP Right 2025    Procedure: TROCHANTERIC BURSECTOMY RIGHT;  Surgeon: Kaleb Issa MD;  Location: [facility]    REPAIR TENDON HAMSTRING Left 2024    Procedure: OPEN LEFT HIP GLUTEUS MEDIUS TENDON REPAIR WITH ILIOTIBIAL BAND LENGTHENING;  Surgeon: Kaleb Issa MD;  Location: [facility]      Allergies   Allergen Reactions    Ciprofloxacin Hives and Rash     \"Many years ago\"    Oxycodone Other (See Comments)     Does NOT like-makes her feel weird, dizzy, weak      Social History     Tobacco Use    Smoking status: Never    Smokeless tobacco: Never   Substance Use Topics    Alcohol use: Not Currently      Wt Readings from Last 1 Encounters:   25 76.2 kg (167 lb 15.9 oz)        Anesthesia Evaluation   Pt has had prior anesthetic.     No history of anesthetic complications       ROS/MED HX  ENT/Pulmonary:  - neg pulmonary ROS     Neurologic:  - neg neurologic ROS     Cardiovascular:     (+) Dyslipidemia - -   -  - -                                   (-) BORJA, orthopnea/PND and murmur   METS/Exercise Tolerance: 4 - Raking leaves, gardening    Hematologic:  - neg hematologic  ROS     Musculoskeletal:  - neg musculoskeletal ROS     GI/Hepatic: Comment: Perforated diverticulitis       Renal/Genitourinary:  - neg Renal ROS     Endo:     (+)          thyroid problem, " "hypothyroidism,           Psychiatric/Substance Use:  - neg psychiatric ROS     Infectious Disease:  - neg infectious disease ROS     Malignancy:  - neg malignancy ROS     Other:  - neg other ROS          Physical Exam    Airway        Mallampati: II   TM distance: > 3 FB   Neck ROM: full   Mouth opening: > 3 cm    Respiratory Devices and Support         Dental       (+) Minor Abnormalities - some fillings, tiny chips      Cardiovascular          Rhythm and rate: regular and normal (-) no murmur    Pulmonary           breath sounds clear to auscultation           OUTSIDE LABS:  CBC:   Lab Results   Component Value Date    WBC 9.1 05/01/2025    WBC 8.7 04/30/2025    HGB 10.4 (L) 05/01/2025    HGB 9.1 (L) 04/30/2025    HCT 33.2 (L) 05/01/2025    HCT 29.1 (L) 04/30/2025     05/01/2025     04/30/2025     BMP:   Lab Results   Component Value Date     05/01/2025     04/30/2025    POTASSIUM 3.3 (L) 05/01/2025    POTASSIUM 3.5 04/30/2025    CHLORIDE 103 05/01/2025    CHLORIDE 109 (H) 04/30/2025    CO2 22 05/01/2025    CO2 24 04/30/2025    BUN 12.0 05/01/2025    BUN 12.7 04/30/2025    CR 0.89 05/01/2025    CR 0.96 (H) 04/30/2025    GLC 77 05/01/2025     (H) 04/30/2025     COAGS:   Lab Results   Component Value Date    PTT 31 04/27/2025    INR 1.16 (H) 04/28/2025     POC: No results found for: \"BGM\", \"HCG\", \"HCGS\"  HEPATIC:   Lab Results   Component Value Date    ALBUMIN 3.5 05/01/2025    PROTTOTAL 6.6 05/01/2025    ALT 65 (H) 05/01/2025    AST 23 05/01/2025    ALKPHOS 350 (H) 05/01/2025    BILITOTAL 0.4 05/01/2025     OTHER:   Lab Results   Component Value Date    LACT 1.4 04/27/2025    ZACHARIAH 8.9 05/01/2025    MAG 2.3 04/30/2025       Anesthesia Plan    ASA Status:  2    NPO Status:  NPO Appropriate    Anesthesia Type: General.     - Airway: ETT   Induction: Intravenous.   Maintenance: Balanced.   Techniques and Equipment:       - Drips/Meds: Ketamine, Dexmed. bolus     Consents    Anesthesia " "Plan(s) and associated risks, benefits, and realistic alternatives discussed. Questions answered and patient/representative(s) expressed understanding.     - Discussed:     - Discussed with:  Patient      - Extended Intubation/Ventilatory Support Discussed: No.      - Patient is DNR/DNI Status: No     Use of blood products discussed: Yes.     - Discussed with: Patient.     Postoperative Care    Pain management: IV analgesics, Oral pain medications, Multi-modal analgesia.   PONV prophylaxis: Ondansetron (or other 5HT-3), Dexamethasone or Solumedrol, Background Propofol Infusion     Comments:    Other Comments:                Jeremy Amin MD    Clinically Significant Risk Factors        # Hypokalemia: Lowest K = 3.3 mmol/L in last 2 days, will replace as needed   # Hyperchloremia: Highest Cl = 109 mmol/L in last 2 days, will monitor as appropriate      # Hypocalcemia: Lowest Ca = 8.6 mg/dL in last 2 days, will monitor and replace as appropriate     # Hypoalbuminemia: Lowest albumin = 3.3 g/dL at 4/28/2025  6:14 AM, will monitor as appropriate                # Overweight: Estimated body mass index is 28.39 kg/m  as calculated from the following:    Height as of 4/22/25: 1.638 m (5' 4.5\").    Weight as of this encounter: 76.2 kg (167 lb 15.9 oz)., PRESENT ON ADMISSION              "

## 2025-05-01 NOTE — PROGRESS NOTES
"[facility]    Medicine Progress Note - Hospitalist Service    Date of Admission:  4/27/2025    Assessment & Plan   70-year-old female with recent surgery to RLE presented with abdominal pain and found to have perforated diverticulitis.    #Sigmoid colon diverticulitis with perforation;  Despite on IV antibiotics, follow-up CT with new abscess, persistent pneumoperitoneum.  On 5/1, laparoscopic washout with drain placement.  On IV Zosyn, due to worsening CT finding, ID consulted.  Postoperative pain management surgery team    #Recent RLE tendon repair and trochanteric bursectomy on 4/22/25;  CT shows fluid collection.  Previous hospitalist discussed with Ortho PA and they are not concerned for abscess  Continue activity level as instructed by orthopedic team    #Acute blood loss anemia;  Likely related to recent surgery, sepsis, hemodilution  Hemoglobin fairly stable.    Hypokalemia, replace per protocol        Diet: Full Liquid Diet    DVT Prophylaxis: Defer to surgery team  Luz Catheter: Not present  Lines: None     Cardiac Monitoring: None  Code Status: Full Code      Clinically Significant Risk Factors        # Hypokalemia: Lowest K = 3.3 mmol/L in last 2 days, will replace as needed   # Hyperchloremia: Highest Cl = 109 mmol/L in last 2 days, will monitor as appropriate      # Hypocalcemia: Lowest Ca = 8.6 mg/dL in last 2 days, will monitor and replace as appropriate     # Hypoalbuminemia: Lowest albumin = 3.3 g/dL at 4/28/2025  6:14 AM, will monitor as appropriate                # Overweight: Estimated body mass index is 28.39 kg/m  as calculated from the following:    Height as of 4/22/25: 1.638 m (5' 4.5\").    Weight as of this encounter: 76.2 kg (167 lb 15.9 oz)., PRESENT ON ADMISSION            Social Drivers of Health    Housing Stability: High Risk (4/28/2025)    Housing Stability     Do you have housing? : No     Are you worried about losing your housing?: No   Physical " Activity: Insufficiently Active (3/16/2024)    Received from [facility]    Exercise Vital Sign     Days of Exercise per Week: 3 days     Minutes of Exercise per Session: 30 min          Disposition Plan     Medically Ready for Discharge: Anticipated in 2-4 Days             Vladislav MENDOZA MD  Hospitalist Service  [facility]  Securely message with [employer] (more info)  Text page via [employer] Paging/Directory   ______________________________________________________________________    Interval History   Patient seen and examined.  Notes, labs, imaging report personally reviewed.  Patient had laparoscopic washout today.  Operative note is still not available.  Patient reported abdominal pain fairly controlled.  No nausea.  No short of breath or chest pain.  Discussed with family members at bedside.  Discussed with nursing staff at bedside.    Physical Exam   Vital Signs: Temp: 98.7  F (37.1  C) Temp src: Temporal BP: (!) 163/77 Pulse: 75   Resp: 22 SpO2: 95 % O2 Device: None (Room air)    Weight: 167 lbs 15.85 oz      General: Not in obvious distress.  HEENT: Normocephalic, supple neck  Chest: Clear to auscultation bilateral anteriorly, no wheezing  Heart: S1S2 normal, regular  Abdomen: Mild distention, BEKA drain with serosanguineous output  Extremities: No legs swelling  Neuro: Looks little drowsy at she just came out of surgery, follows simple commands appropriately, moving all extremities        Medical Decision Making             Data     I have personally reviewed the following data over the past 24 hrs:    9.1  \   10.4 (L)   / 375     140 103 12.0 /  77   3.3 (L) 22 0.89 \     ALT: 65 (H) AST: 23 AP: 350 (H) TBILI: 0.4   ALB: 3.5 TOT PROTEIN: 6.6 LIPASE: N/A       Imaging results reviewed over the past 24 hrs:   No results found for this or any previous visit (from the past 24 hours).

## 2025-05-02 VITALS
OXYGEN SATURATION: 98 % | BODY MASS INDEX: 28.39 KG/M2 | RESPIRATION RATE: 15 BRPM | TEMPERATURE: 98.8 F | HEART RATE: 63 BPM | WEIGHT: 167.99 LBS | SYSTOLIC BLOOD PRESSURE: 126 MMHG | DIASTOLIC BLOOD PRESSURE: 64 MMHG

## 2025-05-02 LAB
CREAT SERPL-MCNC: 0.9 MG/DL (ref 0.51–0.95)
EGFRCR SERPLBLD CKD-EPI 2021: 68 ML/MIN/1.73M2
ERYTHROCYTE [DISTWIDTH] IN BLOOD BY AUTOMATED COUNT: 13 % (ref 10–15)
GLUCOSE SERPL-MCNC: 118 MG/DL (ref 70–99)
HCT VFR BLD AUTO: 30.7 % (ref 35–47)
HGB BLD-MCNC: 10 G/DL (ref 11.7–15.7)
HGB BLD-MCNC: 10 G/DL (ref 11.7–15.7)
MAGNESIUM SERPL-MCNC: 2 MG/DL (ref 1.7–2.3)
MCH RBC QN AUTO: 28.8 PG (ref 26.5–33)
MCHC RBC AUTO-ENTMCNC: 32.6 G/DL (ref 31.5–36.5)
MCV RBC AUTO: 89 FL (ref 78–100)
PLATELET # BLD AUTO: 403 10E3/UL (ref 150–450)
POTASSIUM SERPL-SCNC: 4.5 MMOL/L (ref 3.4–5.3)
RBC # BLD AUTO: 3.47 10E6/UL (ref 3.8–5.2)
WBC # BLD AUTO: 9 10E3/UL (ref 4–11)

## 2025-05-02 PROCEDURE — 85027 COMPLETE CBC AUTOMATED: CPT | Performed by: INTERNAL MEDICINE

## 2025-05-02 PROCEDURE — 85018 HEMOGLOBIN: CPT | Performed by: INTERNAL MEDICINE

## 2025-05-02 PROCEDURE — 84132 ASSAY OF SERUM POTASSIUM: CPT | Performed by: INTERNAL MEDICINE

## 2025-05-02 PROCEDURE — 120N000001 HC R&B MED SURG/OB

## 2025-05-02 PROCEDURE — 250N000011 HC RX IP 250 OP 636: Mod: JZ | Performed by: INTERNAL MEDICINE

## 2025-05-02 PROCEDURE — 99232 SBSQ HOSP IP/OBS MODERATE 35: CPT | Performed by: INTERNAL MEDICINE

## 2025-05-02 PROCEDURE — 99232 SBSQ HOSP IP/OBS MODERATE 35: CPT | Mod: 24

## 2025-05-02 PROCEDURE — 82565 ASSAY OF CREATININE: CPT | Performed by: INTERNAL MEDICINE

## 2025-05-02 PROCEDURE — 250N000013 HC RX MED GY IP 250 OP 250 PS 637

## 2025-05-02 PROCEDURE — 250N000011 HC RX IP 250 OP 636

## 2025-05-02 PROCEDURE — 82947 ASSAY GLUCOSE BLOOD QUANT: CPT | Performed by: INTERNAL MEDICINE

## 2025-05-02 PROCEDURE — 83735 ASSAY OF MAGNESIUM: CPT | Performed by: INTERNAL MEDICINE

## 2025-05-02 PROCEDURE — 36415 COLL VENOUS BLD VENIPUNCTURE: CPT | Performed by: INTERNAL MEDICINE

## 2025-05-02 PROCEDURE — 250N000013 HC RX MED GY IP 250 OP 250 PS 637: Performed by: INTERNAL MEDICINE

## 2025-05-02 PROCEDURE — 250N000011 HC RX IP 250 OP 636: Mod: JZ

## 2025-05-02 RX ORDER — HYDROMORPHONE HCL IN WATER/PF 6 MG/30 ML
0.2 PATIENT CONTROLLED ANALGESIA SYRINGE INTRAVENOUS EVERY 4 HOURS PRN
Status: DISCONTINUED | OUTPATIENT
Start: 2025-05-02 | End: 2025-05-03

## 2025-05-02 RX ORDER — ACETAMINOPHEN 650 MG/1
650 SUPPOSITORY RECTAL EVERY 4 HOURS PRN
Status: DISCONTINUED | OUTPATIENT
Start: 2025-05-02 | End: 2025-05-02

## 2025-05-02 RX ORDER — HYDRALAZINE HYDROCHLORIDE 10 MG/1
10 TABLET, FILM COATED ORAL EVERY 6 HOURS PRN
Status: DISCONTINUED | OUTPATIENT
Start: 2025-05-02 | End: 2025-05-12 | Stop reason: HOSPADM

## 2025-05-02 RX ORDER — HYDROMORPHONE HYDROCHLORIDE 2 MG/1
2 TABLET ORAL EVERY 4 HOURS PRN
Status: DISCONTINUED | OUTPATIENT
Start: 2025-05-02 | End: 2025-05-03

## 2025-05-02 RX ORDER — ATORVASTATIN CALCIUM 10 MG/1
20 TABLET, FILM COATED ORAL AT BEDTIME
Status: DISCONTINUED | OUTPATIENT
Start: 2025-05-04 | End: 2025-05-12 | Stop reason: HOSPADM

## 2025-05-02 RX ORDER — ATORVASTATIN CALCIUM 10 MG/1
20 TABLET, FILM COATED ORAL AT BEDTIME
Status: DISCONTINUED | OUTPATIENT
Start: 2025-05-03 | End: 2025-05-02

## 2025-05-02 RX ORDER — KETOROLAC TROMETHAMINE 15 MG/ML
15 INJECTION, SOLUTION INTRAMUSCULAR; INTRAVENOUS EVERY 6 HOURS PRN
Status: DISCONTINUED | OUTPATIENT
Start: 2025-05-02 | End: 2025-05-03

## 2025-05-02 RX ORDER — ACETAMINOPHEN 500 MG
500 TABLET ORAL EVERY 6 HOURS PRN
Status: DISCONTINUED | OUTPATIENT
Start: 2025-05-02 | End: 2025-05-03

## 2025-05-02 RX ORDER — POLYETHYLENE GLYCOL 3350 17 G/17G
17 POWDER, FOR SOLUTION ORAL DAILY PRN
Status: DISCONTINUED | OUTPATIENT
Start: 2025-05-02 | End: 2025-05-12 | Stop reason: HOSPADM

## 2025-05-02 RX ORDER — ACETAMINOPHEN 325 MG/1
325-650 TABLET ORAL EVERY 4 HOURS PRN
Status: DISCONTINUED | OUTPATIENT
Start: 2025-05-02 | End: 2025-05-02

## 2025-05-02 RX ORDER — HYDROMORPHONE HCL IN WATER/PF 6 MG/30 ML
0.4 PATIENT CONTROLLED ANALGESIA SYRINGE INTRAVENOUS EVERY 4 HOURS PRN
Status: DISCONTINUED | OUTPATIENT
Start: 2025-05-02 | End: 2025-05-03

## 2025-05-02 RX ORDER — SIMETHICONE 80 MG
80 TABLET,CHEWABLE ORAL EVERY 6 HOURS PRN
Status: DISCONTINUED | OUTPATIENT
Start: 2025-05-02 | End: 2025-05-12 | Stop reason: HOSPADM

## 2025-05-02 RX ADMIN — KETOROLAC TROMETHAMINE 15 MG: 15 INJECTION, SOLUTION INTRAMUSCULAR; INTRAVENOUS at 14:37

## 2025-05-02 RX ADMIN — PIPERACILLIN AND TAZOBACTAM 3.38 G: 3; .375 INJECTION, POWDER, FOR SOLUTION INTRAVENOUS at 05:29

## 2025-05-02 RX ADMIN — SIMETHICONE 80 MG: 80 TABLET, CHEWABLE ORAL at 11:18

## 2025-05-02 RX ADMIN — POLYETHYLENE GLYCOL 3350 17 G: 17 POWDER, FOR SOLUTION ORAL at 18:07

## 2025-05-02 RX ADMIN — ACETAMINOPHEN 500 MG: 500 TABLET ORAL at 18:42

## 2025-05-02 RX ADMIN — HYDROMORPHONE HYDROCHLORIDE 1 MG: 2 TABLET ORAL at 18:07

## 2025-05-02 RX ADMIN — HYDROMORPHONE HYDROCHLORIDE 1 MG: 2 TABLET ORAL at 19:30

## 2025-05-02 RX ADMIN — HYDROMORPHONE HYDROCHLORIDE 2 MG: 2 TABLET ORAL at 23:05

## 2025-05-02 RX ADMIN — PIPERACILLIN AND TAZOBACTAM 3.38 G: 3; .375 INJECTION, POWDER, FOR SOLUTION INTRAVENOUS at 16:38

## 2025-05-02 RX ADMIN — LEVOTHYROXINE SODIUM 75 MCG: 0.03 TABLET ORAL at 05:33

## 2025-05-02 RX ADMIN — PIPERACILLIN AND TAZOBACTAM 3.38 G: 3; .375 INJECTION, POWDER, FOR SOLUTION INTRAVENOUS at 11:19

## 2025-05-02 RX ADMIN — KETOROLAC TROMETHAMINE 15 MG: 15 INJECTION, SOLUTION INTRAMUSCULAR; INTRAVENOUS at 08:04

## 2025-05-02 RX ADMIN — HYDROMORPHONE HYDROCHLORIDE 0.4 MG: 0.2 INJECTION, SOLUTION INTRAMUSCULAR; INTRAVENOUS; SUBCUTANEOUS at 11:53

## 2025-05-02 RX ADMIN — PIPERACILLIN AND TAZOBACTAM 3.38 G: 3; .375 INJECTION, POWDER, FOR SOLUTION INTRAVENOUS at 23:06

## 2025-05-02 RX ADMIN — ENOXAPARIN SODIUM 40 MG: 40 INJECTION SUBCUTANEOUS at 08:04

## 2025-05-02 RX ADMIN — HYDROMORPHONE HYDROCHLORIDE 0.2 MG: 0.2 INJECTION, SOLUTION INTRAMUSCULAR; INTRAVENOUS; SUBCUTANEOUS at 09:54

## 2025-05-02 ASSESSMENT — ACTIVITIES OF DAILY LIVING (ADL)
ADLS_ACUITY_SCORE: 38
ADLS_ACUITY_SCORE: 37
ADLS_ACUITY_SCORE: 38
ADLS_ACUITY_SCORE: 37
ADLS_ACUITY_SCORE: 37
ADLS_ACUITY_SCORE: 38
ADLS_ACUITY_SCORE: 37
ADLS_ACUITY_SCORE: 37
ADLS_ACUITY_SCORE: 38

## 2025-05-02 NOTE — PROGRESS NOTES
General Surgery Progress Note:    Hospital Day # 5    ASSESSMENT:  1. Gluteal tendinitis of right buttock    2. Diverticulitis of large intestine with perforation without abscess, unspecified bleeding status        Michelle Isaac is a 70 year old female here with several days of abdominal pain with imaging demonstrating sigmoid diverticulitis with moderate pneumoperitoneum, repeat CT 4/30 with increased inflammation and pelvic fluid collections, now s/p laparoscopic lavage and drain placement.    Patient is vitally stable without leukocytosis or concerning labs, however he is having some increased pain this morning likely due to the drain placement and operation.  Would like to see a little better pain control off of IV narcotics and her tolerating low fiber diet prior to discharge.  Patient is close to discharge maybe as early as later today vs. Tomorrow.    ADDENDUM afternoon: Discussed okay for tylenol and toradol for baseline pain management. With increased pain can have narcotic PRN (stated she tolerates vicodin best but dilaudid currently on). Avoid IV narcotics. Expected discomfort in the lower abdomen today as the drain sits low in the pelvis and inflammation still present.    PLAN:  -Very slowly advance to low fiber diet  -Activity as tolerated  -Multimodal pain management - PO medications > IV  -Continue BEKA drain patient will discharge with this, scheduling follow-up   -Discharge recommendations involve instructions placed in AVS.  -Follow up as scheduled  -Medical management per primary team    SUBJECTIVE:   Michelle Isaac was seen on rounds.  States she is doing okay but she did have an increase in severity of pain around 8 AM, also just had another bout of pain around 10:30 AM which required IV narcotic.  She states its similar to previous days but almost more severe and also occurs when she is passing flatus or sitting on the toilet or passing urine.  She is nervous about going home.  She has had  just a little bit of yogurt and milk this morning prior to these episodes.    Discussion about discharging plan, when pain is tolerated patient is okay for discharge from surgical standpoint and will follow-up with appointment for drain and then further appointment for discussion on the future.  Likely needs a colonoscopy in about 6 to 8 weeks but will discuss this in appointment.    VITALS RANGE:  Temp:  [96.5  F (35.8  C)-99.2  F (37.3  C)] 97.8  F (36.6  C)  Pulse:  [63-78] 72  Resp:  [15-22] 18  BP: (112-181)/(53-86) 168/80  SpO2:  [93 %-99 %] 99 %    PHYSICAL EXAM:  General: patient seen resting in bed in no acute distress  Resp: no increased work of breathing, breathing comfortably on room air  Abdomen: Generally soft with some component of distention, tenderness with palpation of the lower midline and some spreading to the right and left midline.  Incisions are clean, dry, intact and brace is in place though loose around the waist  Drains: Serosanguineous output which is thin with fluid  Output by Drain (mL) 04/30/25 0700 - 04/30/25 1459 04/30/25 1500 - 04/30/25 2259 04/30/25 2300 - 05/01/25 0659 05/01/25 0700 - 05/01/25 1459 05/01/25 1500 - 05/01/25 2259 05/01/25 2300 - 05/02/25 0659 05/02/25 0700 - 05/02/25 0955   Drain Closed/Suction 1 Left Abdomen Bulb 19 Tristanian     25 30       Extremities: No edema or cyanosis visualized on exam, no obvious deformities    05/01 0700 - 05/02 0659  In: 720 [P.O.:120; I.V.:500]  Out: 660 [Urine:600; Drains:55]    Admission on 04/27/2025   Component Date Value    INR 04/27/2025 1.04     aPTT 04/27/2025 31     Lactic Acid, Initial 04/27/2025 1.4     WBC Count 04/27/2025 12.2 (H)     RBC Count 04/27/2025 4.30     Hemoglobin 04/27/2025 12.2     Hematocrit 04/27/2025 37.3     MCV 04/27/2025 87     MCH 04/27/2025 28.4     MCHC 04/27/2025 32.7     RDW 04/27/2025 12.9     Platelet Count 04/27/2025 330     % Neutrophils 04/27/2025 84     % Lymphocytes 04/27/2025 10     % Monocytes  04/27/2025 6     % Eosinophils 04/27/2025 0     % Basophils 04/27/2025 0     % Immature Granulocytes 04/27/2025 1     NRBCs per 100 WBC 04/27/2025 0     Absolute Neutrophils 04/27/2025 10.2 (H)     Absolute Lymphocytes 04/27/2025 1.2     Absolute Monocytes 04/27/2025 0.7     Absolute Eosinophils 04/27/2025 0.0     Absolute Basophils 04/27/2025 0.0     Absolute Immature Granul* 04/27/2025 0.1     Absolute NRBCs 04/27/2025 0.0     ABO/RH(D) 04/27/2025 A POS     Antibody Screen 04/27/2025 Negative     SPECIMEN EXPIRATION DATE 04/27/2025 [id number]     Hold Specimen 04/27/2025 JIC     Sodium 04/28/2025 136     Potassium 04/28/2025 3.7     Carbon Dioxide (CO2) 04/28/2025 25     Anion Gap 04/28/2025 8     Urea Nitrogen 04/28/2025 12.0     Creatinine 04/28/2025 0.70     GFR Estimate 04/28/2025 >90     Calcium 04/28/2025 8.2 (L)     Chloride 04/28/2025 103     Glucose 04/28/2025 123 (H)     Alkaline Phosphatase 04/28/2025 181 (H)     AST 04/28/2025 74 (H)     ALT 04/28/2025 147 (H)     Protein Total 04/28/2025 6.3 (L)     Albumin 04/28/2025 3.3 (L)     Bilirubin Total 04/28/2025 0.9     INR 04/28/2025 1.16 (H)     MRSA Target DNA 04/28/2025 Negative     SA Target DNA 04/28/2025 Positive     WBC Count 04/28/2025 10.7     RBC Count 04/28/2025 3.56 (L)     Hemoglobin 04/28/2025 10.0 (L)     Hematocrit 04/28/2025 31.5 (L)     MCV 04/28/2025 89     MCH 04/28/2025 28.1     MCHC 04/28/2025 31.7     RDW 04/28/2025 12.9     Platelet Count 04/28/2025 266     % Neutrophils 04/28/2025 79     % Lymphocytes 04/28/2025 13     % Monocytes 04/28/2025 6     % Eosinophils 04/28/2025 0     % Basophils 04/28/2025 0     % Immature Granulocytes 04/28/2025 1     NRBCs per 100 WBC 04/28/2025 0     Absolute Neutrophils 04/28/2025 8.4 (H)     Absolute Lymphocytes 04/28/2025 1.4     Absolute Monocytes 04/28/2025 0.7     Absolute Eosinophils 04/28/2025 0.0     Absolute Basophils 04/28/2025 0.0     Absolute Immature Granul* 04/28/2025 0.1      Absolute NRBCs 04/28/2025 0.0     Sodium 04/29/2025 141     Potassium 04/29/2025 3.3 (L)     Chloride 04/29/2025 106     Carbon Dioxide (CO2) 04/29/2025 26     Anion Gap 04/29/2025 9     Urea Nitrogen 04/29/2025 13.1     Creatinine 04/29/2025 0.94     GFR Estimate 04/29/2025 64     Calcium 04/29/2025 8.5 (L)     Glucose 04/29/2025 103 (H)     WBC Count 04/29/2025 10.8     RBC Count 04/29/2025 3.63 (L)     Hemoglobin 04/29/2025 10.2 (L)     Hematocrit 04/29/2025 33.2 (L)     MCV 04/29/2025 92     MCH 04/29/2025 28.1     MCHC 04/29/2025 30.7 (L)     RDW 04/29/2025 13.1     Platelet Count 04/29/2025 296     % Neutrophils 04/29/2025 78     % Lymphocytes 04/29/2025 13     % Monocytes 04/29/2025 7     % Eosinophils 04/29/2025 1     % Basophils 04/29/2025 0     % Immature Granulocytes 04/29/2025 1     NRBCs per 100 WBC 04/29/2025 0     Absolute Neutrophils 04/29/2025 8.4 (H)     Absolute Lymphocytes 04/29/2025 1.4     Absolute Monocytes 04/29/2025 0.8     Absolute Eosinophils 04/29/2025 0.1     Absolute Basophils 04/29/2025 0.0     Absolute Immature Granul* 04/29/2025 0.1     Absolute NRBCs 04/29/2025 0.0     Sodium 04/30/2025 141     Potassium 04/30/2025 3.5     Chloride 04/30/2025 109 (H)     Carbon Dioxide (CO2) 04/30/2025 24     Anion Gap 04/30/2025 8     Urea Nitrogen 04/30/2025 12.7     Creatinine 04/30/2025 0.96 (H)     GFR Estimate 04/30/2025 63     Calcium 04/30/2025 8.6 (L)     Glucose 04/30/2025 100 (H)     WBC Count 04/30/2025 8.7     RBC Count 04/30/2025 3.20 (L)     Hemoglobin 04/30/2025 9.1 (L)     Hematocrit 04/30/2025 29.1 (L)     MCV 04/30/2025 91     MCH 04/30/2025 28.4     MCHC 04/30/2025 31.3 (L)     RDW 04/30/2025 13.2     Platelet Count 04/30/2025 280     % Neutrophils 04/30/2025 75     % Lymphocytes 04/30/2025 14     % Monocytes 04/30/2025 8     % Eosinophils 04/30/2025 2     % Basophils 04/30/2025 1     % Immature Granulocytes 04/30/2025 1     NRBCs per 100 WBC 04/30/2025 0     Absolute Neutrophils  04/30/2025 6.5     Absolute Lymphocytes 04/30/2025 1.2     Absolute Monocytes 04/30/2025 0.7     Absolute Eosinophils 04/30/2025 0.2     Absolute Basophils 04/30/2025 0.0     Absolute Immature Granul* 04/30/2025 0.1     Absolute NRBCs 04/30/2025 0.0     Magnesium 04/30/2025 2.3     Sodium 05/01/2025 140     Potassium 05/01/2025 3.3 (L)     Carbon Dioxide (CO2) 05/01/2025 22     Anion Gap 05/01/2025 15     Urea Nitrogen 05/01/2025 12.0     Creatinine 05/01/2025 0.89     GFR Estimate 05/01/2025 69     Calcium 05/01/2025 8.9     Chloride 05/01/2025 103     Glucose 05/01/2025 77     Alkaline Phosphatase 05/01/2025 350 (H)     AST 05/01/2025 23     ALT 05/01/2025 65 (H)     Protein Total 05/01/2025 6.6     Albumin 05/01/2025 3.5     Bilirubin Total 05/01/2025 0.4     WBC Count 05/01/2025 9.1     RBC Count 05/01/2025 3.69 (L)     Hemoglobin 05/01/2025 10.4 (L)     Hematocrit 05/01/2025 33.2 (L)     MCV 05/01/2025 90     MCH 05/01/2025 28.2     MCHC 05/01/2025 31.3 (L)     RDW 05/01/2025 13.0     Platelet Count 05/01/2025 375     Potassium 05/01/2025 3.8     Hemoglobin 05/02/2025 10.0 (L)     Magnesium 05/02/2025 2.0     Potassium 05/02/2025 4.5     Glucose 05/02/2025 118 (H)         Oanh Martinez PA-C  [place] [id number] [address] [address]  [facility] [phone]

## 2025-05-02 NOTE — PLAN OF CARE
Problem: Intestinal Obstruction  Goal: Optimal Bowel Function  Outcome: Progressing  Intervention: Promote Bowel Function     Problem: Intestinal Obstruction  Goal: Optimize Nutrition Status  Outcome: Progressing   Problem: Adult Inpatient Plan of Care  Goal: Absence of Hospital-Acquired Illness or Injury  Outcome: Progressing  Intervention: Identify and Manage Fall Risk       Pt Aox4, calls appropriately for assistance.  Reports moderate abdominal pain with movement, otherwise minimal pain while at rest, no prns requested overnight.  BS +, passing gas, voiding, 30 ccs serosang output from BEKA drain.  Pt on full liquid diet - not much PO intake overnight.  Pt compliant with her orthotic brace and weight bearing restrictions.

## 2025-05-02 NOTE — PLAN OF CARE
Problem: Adult Inpatient Plan of Care  Goal: Plan of Care Review  Description: The Plan of Care Review/Shift note should be completed every shift.  The Outcome Evaluation is a brief statement about your assessment that the patient is improving, declining, or no change.  This information will be displayed automatically on your shiftnote.  Outcome: Progressing   Goal Outcome Evaluation:       Pt had increased abdominal pain this am, received IV Dilaudid twice this shift with relief. Tolerating a full liquid diet and advanced to low fiber for dinner. Activity encouraged, limited due to tendon repair surgery.

## 2025-05-02 NOTE — PROGRESS NOTES
Orthopedic Progress Note      Assessment:    Status post right gluteal tendon repair on 4/22/2025 with Dr. Issa     Plan:   - No indication for urgent surgical intervention from an orthopedic standpoint.  - Brace at all times.  - Flatfoot weightbearing to the right lower extremity, crutches to be used when ambulating  - No active hip abduction and internal rotation.  No passive hip external rotation and abduction  - Keep dressing clean, dry, and intact until postop visit  - Follow-up with Dr. Issa following discharge  - PT/OT    Ortho will sign off. Please feel free to reach out with any further questions or concerns.         Subjective:    Patient is doing well today, no increased pain in her right hip today.  Brace is fitting comfortably, was replaced after surgery yesterday.  She does note some concern regarding the abdominal strap crossing over her drain, however drain tubing was taped out of the way and secured and abdominal strap is able to be wrapped superiorly to the drain.      Objective:  BP (!) 168/80 (BP Location: Right arm)   Pulse 72   Temp 97.8  F (36.6  C) (Oral)   Resp 18   Wt 76.2 kg (167 lb 15.9 oz)   SpO2 99%   BMI 28.39 kg/m      Patient is A&Ox3, sitting up at bedside  Calves without tenderness, neg Sandeep's  Brisk capillary refill in the toes.   Palpable Right dorsalis pedis pulse. Right foot warm & well-perfused.  Sensation is intact to light touch & equal bilaterally in the femoral, DP, SP & tibial nerve distributions.  Mild TTP of right hip over incision.   Knee extension/flexion without pain.  Ankle DF/PF/inversion/eversion without pain.  Dressings C/D/I.      Pertinent Labs   Lab Results: personally reviewed.   Lab Results   Component Value Date    INR 1.16 (H) 04/28/2025    INR 1.04 04/27/2025     Lab Results   Component Value Date    WBC 9.0 05/02/2025    HGB 10.0 (L) 05/02/2025    HGB 10.0 (L) 05/02/2025    HCT 30.7 (L) 05/02/2025    MCV 89 05/02/2025     05/02/2025      Lab Results   Component Value Date     05/01/2025    CO2 22 05/01/2025         CAROLE ESPINAL PA-C  Date: 05/02/2025  [facility]

## 2025-05-02 NOTE — DISCHARGE INSTRUCTIONS
From your General Surgery Team:    Follow up: Please follow up with general surgery, Dr. Velez or in [facility] clinic, as scheduled. If you have any questions or concerns, please do not hesitate to call us at [phone].    Diet: low fiber diet. Patients can have difficulty with constipation following surgery, due in part to the administration of narcotic medications.  If you are suffering with constipation, you should avoid foods such as hard cheeses or red meat.       Activity: You should continue to be active at home, including ambulating frequently.  If possible try to limit the amount of time spent in bed.    Restrictions: You should not lift greater than 20 pounds for 2 weeks, and will want to avoid strenuous physical activity for 1-2 weeks.  You should limit your physical activity if it causes you discomfort; however, this should resolve within 1-2 weeks.   Walking does not count as strenuous physical activity.  You are safe to walk up and down stairs.  Following 2 weeks you may resume all normal physical activity.    Wound care: Your wounds are covered with Dermabond which is a waterproof skin glue. This will peel off on its own after 14 days.  It is normal to have a small rim of red present around the incisions. This should not, however, extend beyond 1/4 inch from the incision.  If your incisions become increasingly tender, red, or draining, please contact us.       Drain care: You are going home with a surgical drain. Please continue to monitor the output daily from your drain and keep a log of the daily output to bring with you to your follow-up appointment. Please call if you notice any change in the character of the output from your drain.    Bathing: You may shower after 24 hours from surgery.  It is ok to get your incisions wet, but avoid rubbing them.  Avoid soaking in bath tubs, or swimming in lakes, pools, or streams for 2 weeks following surgery.     Daily Drain Log:  If you have a surgical drain  in place we would like you to track how much is coming out of the drain at least daily until your follow-up appointment to help us to decide the best timing for removing the drain. If you notice the drain changes greatly in color in a short amount of time (for example light yellow to dark green, thin fluid to thick or bad smelling fluid) please call our clinic at [phone]    Date Daily Output in milliliters (mL) Color (yellow tinged, reddish yellow, dark red, etc.)   and/or character (thin, cloudy, thick) Other Notes

## 2025-05-02 NOTE — CONSULTS
CLINICAL NUTRITION SERVICES -BRIEF NOTE    Provider order - supplements    Call from RN stating MD wants pt to have supplements, wondering what flavors are available. Pt would like chocolate flavored, 2 per day, between meals    INTERVENTIONS  Chocolate Ensure Enlive 10 am and 2 pm

## 2025-05-02 NOTE — PROGRESS NOTES
[facility]    Medicine Progress Note - Hospitalist Service    Date of Admission:  4/27/2025    Assessment & Plan   70-year-old female with recent surgery to RLE presented with abdominal pain and found to have perforated diverticulitis.    #Sigmoid colon diverticulitis with perforation;    Despite on IV antibiotics, follow-up CT with new abscess, persistent pneumoperitoneum and on 5/01/25, underwent laparoscopic washout with drain placement.  Monitor drain output  On IV Zosyn, due to worsening CT finding, ID consulted, appreciated input    Acute postoperative abdominal pain;  -- Patient wants to avoid narcotics.  Discussed with general surgery, they cleared patient for as needed IV Toradol.  As needed Tylenol.  As needed narcotics available just in case for severe pain.    #Recent RLE tendon repair and trochanteric bursectomy on 4/22/25;  CT shows fluid collection.  Previous hospitalist discussed with Ortho PA and they are not concerned for abscess  Continue activity level as instructed by orthopedic team  -Brace at all times.  - Flatfoot weightbearing to the right lower extremity, crutches to be used when ambulating  - No active hip abduction and internal rotation.  No passive hip external rotation and abduction    #Acute blood loss anemia;  Likely related to recent surgery, sepsis, hemodilution  Hemoglobin fairly stable.    Hypokalemia, replaced    Physical deconditioning due to medical illness; PT    Transaminitis on admission, uncertain etiology, recheck labs AST, ALT trending down          Diet: Low Fiber Diet  Diet  Snacks/Supplements Adult: Ensure Enlive; Between Meals    DVT Prophylaxis: Enoxaparin (Lovenox) SQ  Luz Catheter: Not present  Lines: None     Cardiac Monitoring: None  Code Status: Full Code      Clinically Significant Risk Factors        # Hypokalemia: Lowest K = 3.3 mmol/L in last 2 days, will replace as needed        # Hypoalbuminemia: Lowest albumin = 3.3 g/dL at  "4/28/2025  6:14 AM, will monitor as appropriate                # Overweight: Estimated body mass index is 28.39 kg/m  as calculated from the following:    Height as of 4/22/25: 1.638 m (5' 4.5\").    Weight as of this encounter: 76.2 kg (167 lb 15.9 oz).             Social Drivers of Health    Housing Stability: High Risk (4/28/2025)    Housing Stability     Do you have housing? : No     Are you worried about losing your housing?: No   Physical Activity: Insufficiently Active (3/16/2024)    Received from [facility]    Exercise Vital Sign     Days of Exercise per Week: 3 days     Minutes of Exercise per Session: 30 min          Disposition Plan     Medically Ready for Discharge: Anticipated Tomorrow             Vladislav MENDOZA MD  Hospitalist Service  [facility]  Securely message with [employer] (more info)  Text page via [facility] Paging/Directory   ______________________________________________________________________    Interval History   Patient seen and examined.  Notes, labs, imaging report personally reviewed.  Patient still complaining of intermittent significant lower abdominal crampy pain, reported passing gas.  Denies significant nausea, no vomiting.  Denied fever.  Denied short of breath or chest pain.  Discussed with nursing staff.    Physical Exam   Vital Signs: Temp: 97.8  F (36.6  C) Temp src: Oral BP: (!) 168/80 Pulse: 72   Resp: 18 SpO2: 99 % O2 Device: None (Room air)    Weight: 167 lbs 15.85 oz      General: Not in obvious distress.  HEENT: Normocephalic, supple neck  Chest: Clear to auscultation bilateral anteriorly, no wheezing  Heart: S1S2 normal, regular  Abdomen: Soft.  Tenderness mostly in lower abdomen, BEKA drain with serosanguineous output  Extremities: RLE with the brace  Neuro: alert and awake, grossly non-focal        Medical Decision Making             Data     I have personally reviewed the following data over the past 24 hrs:    9.0  \   10.0 (L); 10.0 (L)   / 403     N/A " N/A N/A /  118 (H)   4.5 N/A 0.90 \       Imaging results reviewed over the past 24 hrs:   No results found for this or any previous visit (from the past 24 hours).

## 2025-05-03 ENCOUNTER — APPOINTMENT (OUTPATIENT)
Dept: PHYSICAL THERAPY | Facility: HOSPITAL | Age: 70
DRG: 853 | End: 2025-05-03
Payer: MEDICARE

## 2025-05-03 LAB — GLUCOSE BLDC GLUCOMTR-MCNC: 127 MG/DL (ref 70–99)

## 2025-05-03 PROCEDURE — 99232 SBSQ HOSP IP/OBS MODERATE 35: CPT | Performed by: INTERNAL MEDICINE

## 2025-05-03 PROCEDURE — G0545 PR INHRENT VISIT TO INPT/OBS W CNFRM/SUSPCT INFCT DIS BY INFCT DIS SPCIALST: HCPCS | Performed by: INTERNAL MEDICINE

## 2025-05-03 PROCEDURE — 250N000011 HC RX IP 250 OP 636: Mod: JZ | Performed by: NURSE PRACTITIONER

## 2025-05-03 PROCEDURE — 250N000011 HC RX IP 250 OP 636

## 2025-05-03 PROCEDURE — 120N000001 HC R&B MED SURG/OB

## 2025-05-03 PROCEDURE — 250N000013 HC RX MED GY IP 250 OP 250 PS 637

## 2025-05-03 PROCEDURE — 250N000013 HC RX MED GY IP 250 OP 250 PS 637: Performed by: INTERNAL MEDICINE

## 2025-05-03 PROCEDURE — 97530 THERAPEUTIC ACTIVITIES: CPT | Mod: GP

## 2025-05-03 PROCEDURE — 250N000011 HC RX IP 250 OP 636: Performed by: INTERNAL MEDICINE

## 2025-05-03 PROCEDURE — 250N000013 HC RX MED GY IP 250 OP 250 PS 637: Performed by: NURSE PRACTITIONER

## 2025-05-03 RX ORDER — ACETAMINOPHEN 325 MG/1
650 TABLET ORAL EVERY 6 HOURS
Status: DISCONTINUED | OUTPATIENT
Start: 2025-05-03 | End: 2025-05-03

## 2025-05-03 RX ORDER — ACETAMINOPHEN 500 MG
500 TABLET ORAL EVERY 6 HOURS
Status: DISCONTINUED | OUTPATIENT
Start: 2025-05-03 | End: 2025-05-05

## 2025-05-03 RX ORDER — HYDROMORPHONE HYDROCHLORIDE 4 MG/1
4 TABLET ORAL EVERY 4 HOURS PRN
Status: DISCONTINUED | OUTPATIENT
Start: 2025-05-03 | End: 2025-05-12 | Stop reason: HOSPADM

## 2025-05-03 RX ORDER — KETOROLAC TROMETHAMINE 15 MG/ML
15 INJECTION, SOLUTION INTRAMUSCULAR; INTRAVENOUS EVERY 6 HOURS
Status: DISCONTINUED | OUTPATIENT
Start: 2025-05-03 | End: 2025-05-03

## 2025-05-03 RX ORDER — HYDROMORPHONE HYDROCHLORIDE 2 MG/1
2 TABLET ORAL EVERY 4 HOURS PRN
Status: DISCONTINUED | OUTPATIENT
Start: 2025-05-03 | End: 2025-05-12 | Stop reason: HOSPADM

## 2025-05-03 RX ORDER — KETOROLAC TROMETHAMINE 15 MG/ML
15 INJECTION, SOLUTION INTRAMUSCULAR; INTRAVENOUS EVERY 6 HOURS
Status: DISCONTINUED | OUTPATIENT
Start: 2025-05-03 | End: 2025-05-04

## 2025-05-03 RX ORDER — FLUCONAZOLE 200 MG/1
200 TABLET ORAL DAILY
Status: DISCONTINUED | OUTPATIENT
Start: 2025-05-03 | End: 2025-05-12 | Stop reason: HOSPADM

## 2025-05-03 RX ADMIN — PIPERACILLIN AND TAZOBACTAM 3.38 G: 3; .375 INJECTION, POWDER, FOR SOLUTION INTRAVENOUS at 10:44

## 2025-05-03 RX ADMIN — ACETAMINOPHEN 500 MG: 500 TABLET ORAL at 01:20

## 2025-05-03 RX ADMIN — ACETAMINOPHEN 500 MG: 500 TABLET ORAL at 11:50

## 2025-05-03 RX ADMIN — FLUCONAZOLE 200 MG: 200 TABLET ORAL at 14:51

## 2025-05-03 RX ADMIN — PANTOPRAZOLE SODIUM 40 MG: 40 INJECTION, POWDER, FOR SOLUTION INTRAVENOUS at 10:36

## 2025-05-03 RX ADMIN — LEVOTHYROXINE SODIUM 75 MCG: 0.03 TABLET ORAL at 06:51

## 2025-05-03 RX ADMIN — PIPERACILLIN AND TAZOBACTAM 3.38 G: 3; .375 INJECTION, POWDER, FOR SOLUTION INTRAVENOUS at 23:10

## 2025-05-03 RX ADMIN — KETOROLAC TROMETHAMINE 15 MG: 15 INJECTION, SOLUTION INTRAMUSCULAR; INTRAVENOUS at 19:59

## 2025-05-03 RX ADMIN — HYDROMORPHONE HYDROCHLORIDE 2 MG: 2 TABLET ORAL at 19:00

## 2025-05-03 RX ADMIN — SIMETHICONE 80 MG: 80 TABLET, CHEWABLE ORAL at 20:05

## 2025-05-03 RX ADMIN — KETOROLAC TROMETHAMINE 15 MG: 15 INJECTION, SOLUTION INTRAMUSCULAR; INTRAVENOUS at 07:23

## 2025-05-03 RX ADMIN — KETOROLAC TROMETHAMINE 15 MG: 15 INJECTION, SOLUTION INTRAMUSCULAR; INTRAVENOUS at 13:51

## 2025-05-03 RX ADMIN — HYDROMORPHONE HYDROCHLORIDE 2 MG: 2 TABLET ORAL at 10:57

## 2025-05-03 RX ADMIN — HYDROMORPHONE HYDROCHLORIDE 2 MG: 2 TABLET ORAL at 23:09

## 2025-05-03 RX ADMIN — ENOXAPARIN SODIUM 40 MG: 40 INJECTION SUBCUTANEOUS at 08:08

## 2025-05-03 RX ADMIN — ACETAMINOPHEN 500 MG: 500 TABLET ORAL at 17:57

## 2025-05-03 RX ADMIN — PIPERACILLIN AND TAZOBACTAM 3.38 G: 3; .375 INJECTION, POWDER, FOR SOLUTION INTRAVENOUS at 05:00

## 2025-05-03 RX ADMIN — HYDROMORPHONE HYDROCHLORIDE 2 MG: 2 TABLET ORAL at 14:51

## 2025-05-03 RX ADMIN — HYDROMORPHONE HYDROCHLORIDE 2 MG: 2 TABLET ORAL at 02:57

## 2025-05-03 RX ADMIN — PIPERACILLIN AND TAZOBACTAM 3.38 G: 3; .375 INJECTION, POWDER, FOR SOLUTION INTRAVENOUS at 16:20

## 2025-05-03 RX ADMIN — HYDROMORPHONE HYDROCHLORIDE 2 MG: 2 TABLET ORAL at 06:51

## 2025-05-03 ASSESSMENT — ACTIVITIES OF DAILY LIVING (ADL)
ADLS_ACUITY_SCORE: 42
ADLS_ACUITY_SCORE: 38
ADLS_ACUITY_SCORE: 41
ADLS_ACUITY_SCORE: 42
ADLS_ACUITY_SCORE: 38
ADLS_ACUITY_SCORE: 41
ADLS_ACUITY_SCORE: 42
ADLS_ACUITY_SCORE: 41
ADLS_ACUITY_SCORE: 41
ADLS_ACUITY_SCORE: 38
ADLS_ACUITY_SCORE: 42
ADLS_ACUITY_SCORE: 38
ADLS_ACUITY_SCORE: 38
ADLS_ACUITY_SCORE: 41
ADLS_ACUITY_SCORE: 38
ADLS_ACUITY_SCORE: 42
ADLS_ACUITY_SCORE: 38
ADLS_ACUITY_SCORE: 41
ADLS_ACUITY_SCORE: 41
ADLS_ACUITY_SCORE: 42
ADLS_ACUITY_SCORE: 42
ADLS_ACUITY_SCORE: 41
ADLS_ACUITY_SCORE: 41

## 2025-05-03 NOTE — PROGRESS NOTES
A1C- 12.0 on 12/09/2024.    [facility]    Medicine Progress Note - Hospitalist Service    Date of Admission:  4/27/2025    Assessment & Plan   70-year-old female with recent surgery to RLE presented with abdominal pain and found to have perforated diverticulitis.    #Sigmoid colon diverticulitis with perforation;  Despite on IV antibiotics, follow-up CT with new abscess, persistent pneumoperitoneum and on 5/01/25, underwent laparoscopic washout with drain placement.  Monitor drain output, surgery planning to keep drain on discharge  Tolerating oral diet.  On IV Zosyn, defer to antibiotic management to ID    Acute postoperative abdominal pain;  -- Overnight ongoing issues with abdominal pain, pain medication adjusted this morning.  On scheduled Tylenol, scheduled Toradol, as needed narcotics.    #Recent RLE tendon repair and trochanteric bursectomy on 4/22/25;  CT shows fluid collection.  Previous hospitalist discussed with Ortho PA and they are not concerned for abscess  Continue activity level as instructed by orthopedic team  -Brace at all times.  - Flatfoot weightbearing to the right lower extremity, crutches to be used when ambulating  - No active hip abduction and internal rotation.  No passive hip external rotation and abduction    #Acute blood loss anemia;  Likely related to recent surgery, sepsis, hemodilution  Hemoglobin fairly stable.    Physical deconditioning due to medical illness; PT    Transaminitis on admission, uncertain etiology, recheck labs AST, ALT trending down          Diet: Low Fiber Diet  Diet  Snacks/Supplements Adult: Ensure Enlive; Between Meals    DVT Prophylaxis: Enoxaparin (Lovenox) SQ  Luz Catheter: Not present  Lines: None     Cardiac Monitoring: None  Code Status: Full Code      Clinically Significant Risk Factors               # Hypoalbuminemia: Lowest albumin = 3.3 g/dL at 4/28/2025  6:14 AM, will monitor as appropriate                # Overweight: Estimated body mass index is  "28.39 kg/m  as calculated from the following:    Height as of 4/22/25: 1.638 m (5' 4.5\").    Weight as of this encounter: 76.2 kg (167 lb 15.9 oz).             Social Drivers of Health    Housing Stability: High Risk (4/28/2025)    Housing Stability     Do you have housing? : No     Are you worried about losing your housing?: No   Physical Activity: Insufficiently Active (3/16/2024)    Received from [facility]    Exercise Vital Sign     Days of Exercise per Week: 3 days     Minutes of Exercise per Session: 30 min          Disposition Plan     Medically Ready for Discharge: Anticipated in 2-4 Days             Vladislav MENDOZA MD  Hospitalist Service  [facility]  Securely message with [employer] (more info)  Text page via [employer] Paging/Directory   ______________________________________________________________________    Interval History   Patient seen and examined.  Notes, labs, imaging report personally reviewed.  Overnight issues with ongoing abdominal pain and patient reported getting frustrated with pain medications regimen.  This morning pain regimen adjusted.  Appreciated surgery input.  Patient reported feeling much better.    Discussed with nursing staff at bedside.  Surprisingly, ID did not see the patient yesterday, discussed with nursing staff to follow-up on that.  Physical Exam   Vital Signs: Temp: 98.1  F (36.7  C) Temp src: Oral BP: (!) 142/89 Pulse: 74   Resp: 18 SpO2: 95 % O2 Device: None (Room air)    Weight: 167 lbs 15.85 oz      General: Not in obvious distress.  HEENT: Normocephalic, supple neck  Chest: Clear to auscultation bilateral anteriorly, no wheezing  Heart: S1S2 normal, regular  Abdomen: Soft.  Mild distention, mild appropriate tenderness, BEKA drain with scanty serosanguineous output  Neuro: alert and awake, grossly non-focal        Medical Decision Making             Data         Imaging results reviewed over the past 24 hrs:   No results found for this or any previous visit " the passage of gas. Contact your physician if you have any of the following: fever or chills, severe abdominal pain or excessive amount of bleeding or a large amount when having a bowel movement. Occasional specks of blood with bowel movement would not be unusual.     (from the past 24 hours).

## 2025-05-03 NOTE — PROGRESS NOTES
"[place] [facility] Progress Note    SUBJECTIVE:  Focused intently on pain control.  Belly soft.  No micro was taken, at time of washout.       REVIEW OF SYSTEMS:  Negative unless as listed above.  Social history, Family history, Medications: reviewed.    OBJECTIVE:  BP (!) 142/89 (BP Location: Left arm)   Pulse 74   Temp 98.1  F (36.7  C) (Oral)   Resp 18   Wt 76.2 kg (167 lb 15.9 oz)   SpO2 95%   BMI 28.39 kg/m                PHYSICAL EXAM:  Alert, awake  Vitals tabulated above, reviewed  Neck supple without lymphadenopathy  Sclera normal color, not injected  CARDIOVASCULAR regular rate and rhythm, no murmur  Lungs CLEAR TO AUSCULTATION   Abdomen soft, NT/ND, absent HEPATOSPLENOMEGALY  Skin normal  Joints normal  Neurologic exam non focal    Antibiotics:z    Pertinent labs:    Recent Labs   Lab Test 05/02/25  0656 05/01/25  0713 04/30/25  0647   WBC 9.0 9.1 8.7   HGB 10.0*  10.0* 10.4* 9.1*   HCT 30.7* 33.2* 29.1*   MCV 89 90 91    375 280       Lab Results   Component Value Date    RBC 3.47 05/02/2025     Lab Results   Component Value Date    HGB 10.0 05/02/2025    HGB 10.0 05/02/2025     Lab Results   Component Value Date    HCT 30.7 05/02/2025     No components found for: \"MCT\"  Lab Results   Component Value Date    MCV 89 05/02/2025     Lab Results   Component Value Date    MCH 28.8 05/02/2025     Lab Results   Component Value Date    MCHC 32.6 05/02/2025     Lab Results   Component Value Date    RDW 13.0 05/02/2025     Lab Results   Component Value Date     05/02/2025       Last Comprehensive Metabolic Panel:  Sodium   Date Value Ref Range Status   05/01/2025 140 135 - 145 mmol/L Final     Potassium   Date Value Ref Range Status   05/02/2025 4.5 3.4 - 5.3 mmol/L Final     Chloride   Date Value Ref Range Status   05/01/2025 103 98 - 107 mmol/L Final     Carbon Dioxide (CO2)   Date Value Ref Range Status   05/01/2025 22 22 - 29 mmol/L Final     Anion Gap   Date Value Ref Range " "Status   05/01/2025 15 7 - 15 mmol/L Final     GLUCOSE BY METER POCT   Date Value Ref Range Status   05/03/2025 127 (H) 70 - 99 mg/dL Final     Urea Nitrogen   Date Value Ref Range Status   05/01/2025 12.0 8.0 - 23.0 mg/dL Final     Creatinine   Date Value Ref Range Status   05/02/2025 0.90 0.51 - 0.95 mg/dL Final     GFR Estimate   Date Value Ref Range Status   05/02/2025 68 >60 mL/min/1.73m2 Final     Comment:     eGFR calculated using 2021 CKD-EPI equation.     Calcium   Date Value Ref Range Status   05/01/2025 8.9 8.8 - 10.4 mg/dL Final       Liver Function Studies -   Recent Labs   Lab Test 05/01/25  0713   PROTTOTAL 6.6   ALBUMIN 3.5   BILITOTAL 0.4   ALKPHOS 350*   AST 23   ALT 65*       No results found for: \"SED\"    No results found for: \"CRP\"            MICROBIOLOGY DATA:        IMAGING/RADIOLOGY:          ASSESSMENT:  Peritonitis, post washout with operative note finding abscess(es)    RECOMMENDATION:  Zosyn while here  Augmentin 875 BID 2 wks at Discharge, with fluc 200mg daily also 2 wks  Will need close Follow-up surgery to be watching for abscess reformation etc  SHERRI Frausto MD  Office [phone] option 2 to desk staff  "

## 2025-05-03 NOTE — PLAN OF CARE
Problem: Adult Inpatient Plan of Care  Goal: Plan of Care Review  Description: The Plan of Care Review/Shift note should be completed every shift.  The Outcome Evaluation is a brief statement about your assessment that the patient is improving, declining, or no change.  This information will be displayed automatically on your shiftnote.  Outcome: Progressing   Goal Outcome Evaluation:       Oral dilaudid given q 4, PRN schedule. BEKA small output. Up with SBA, commode. No BM this shift.

## 2025-05-03 NOTE — PLAN OF CARE
Problem: Adult Inpatient Plan of Care  Goal: Plan of Care Review  Description: The Plan of Care Review/Shift note should be completed every shift.  The Outcome Evaluation is a brief statement about your assessment that the patient is improving, declining, or no change.  This information will be displayed automatically on your shiftnote.  Outcome: Progressing   Goal Outcome Evaluation:       Pt had increased pain at the start of the shift, is now on scheduled tylenol and toradol. Pt reports this has been effective in decreasing abdominal pain. Taking oral dilaudid every four hours. Tolerating a low fiber diet.

## 2025-05-03 NOTE — PLAN OF CARE
Problem: Pain Acute  Goal: Optimal Pain Control and Function  Outcome: Progressing  Intervention: Develop Pain Management Plan  Recent Flowsheet Documentation  Taken 5/2/2025 1930 by Rj Conroy RN  Pain Management Interventions: medication (see MAR)  Intervention: Prevent or Manage Pain  Recent Flowsheet Documentation  Taken 5/2/2025 1930 by Rj Conroy, RN  Medication Review/Management: medications reviewed   Goal Outcome Evaluation:    Pt c/o pain 9/10 wanted to try Dilaudid 1 mg po.  After 1.5 hrs pt still having pain.  Gave another 1 mg of Dilaudid for a total of 2 mg.   Pt has several ways of how Dilaudid can be taken.  Called her daughter Pharmacist to verify instructions.  Cont to be very frustrated about the instructions.

## 2025-05-03 NOTE — PROGRESS NOTES
"ASSESSMENT:  1. Gluteal tendinitis of right buttock    2. Diverticulitis of large intestine with perforation without abscess, unspecified bleeding status        Michelle Isaac is a 70 year old female who is s/p laparoscopic abdominal washout on 4/30/25. Bowel function has returned and patient is tolerating low fiber diet. She can discharge when pain is managed on oral pain medicatins    PLAN:  Increase dilaudid po dose  Stop IV narcotics  Start scheduled pain adjuncts  Increase activity  Home tomorrow if continues to tolerate diet and pain is better controlled    SUBJECTIVE:   She is complaining pain. States that her expectation is \"no pain\". Discussed the fact that this is an unrealistic expectation and we want her to be under control enough to move. She was not happy with this response. She complains that the dilaudid is not lasting long enough and that it takes too long to kick in. She has not had good response with oxycodone in the past, but she may be willing to try it tomorrow if we can't get the pain under control today      Patient Vitals for the past 24 hrs:   BP Temp Temp src Pulse Resp SpO2   05/03/25 0737 (!) 142/89 98.1  F (36.7  C) Oral 74 18 95 %   05/02/25 2357 (!) 160/81 98.8  F (37.1  C) Oral 74 18 96 %   05/02/25 1613 125/68 99.2  F (37.3  C) Oral 77 18 97 %         PHYSICAL EXAM:  GEN: No acute distress, comfortable  LUNGS: CTA bilaterally  CV:RRR  ABD:soft, minimal tenderness, no peritoneal signs, incisions CDI  Drains: serosanguineous   Output by Drain (mL) 05/01/25 0700 - 05/01/25 1459 05/01/25 1500 - 05/01/25 2259 05/01/25 2300 - 05/02/25 0659 05/02/25 0700 - 05/02/25 1459 05/02/25 1500 - 05/02/25 2259 05/02/25 2300 - 05/03/25 0659 05/03/25 0700 - 05/03/25 1109   Drain Closed/Suction 1 Left Abdomen Bulb 19 [place]  25 30  15 5 10      EXT:No cyanosis, edema or obvious abnormalities    05/02 0700 - 05/03 0659  In: 820 [P.O.:720]  Out: 820 [Urine:800; Drains:20]    Admission on 04/27/2025 "   Component Date Value    INR 04/27/2025 1.04     aPTT 04/27/2025 31     Lactic Acid, Initial 04/27/2025 1.4     WBC Count 04/27/2025 12.2 (H)     RBC Count 04/27/2025 4.30     Hemoglobin 04/27/2025 12.2     Hematocrit 04/27/2025 37.3     MCV 04/27/2025 87     MCH 04/27/2025 28.4     MCHC 04/27/2025 32.7     RDW 04/27/2025 12.9     Platelet Count 04/27/2025 330     % Neutrophils 04/27/2025 84     % Lymphocytes 04/27/2025 10     % Monocytes 04/27/2025 6     % Eosinophils 04/27/2025 0     % Basophils 04/27/2025 0     % Immature Granulocytes 04/27/2025 1     NRBCs per 100 WBC 04/27/2025 0     Absolute Neutrophils 04/27/2025 10.2 (H)     Absolute Lymphocytes 04/27/2025 1.2     Absolute Monocytes 04/27/2025 0.7     Absolute Eosinophils 04/27/2025 0.0     Absolute Basophils 04/27/2025 0.0     Absolute Immature Granul* 04/27/2025 0.1     Absolute NRBCs 04/27/2025 0.0     ABO/RH(D) 04/27/2025 A POS     Antibody Screen 04/27/2025 Negative     SPECIMEN EXPIRATION DATE 04/27/2025 [id number]     Hold Specimen 04/27/2025 JIC     Sodium 04/28/2025 136     Potassium 04/28/2025 3.7     Carbon Dioxide (CO2) 04/28/2025 25     Anion Gap 04/28/2025 8     Urea Nitrogen 04/28/2025 12.0     Creatinine 04/28/2025 0.70     GFR Estimate 04/28/2025 >90     Calcium 04/28/2025 8.2 (L)     Chloride 04/28/2025 103     Glucose 04/28/2025 123 (H)     Alkaline Phosphatase 04/28/2025 181 (H)     AST 04/28/2025 74 (H)     ALT 04/28/2025 147 (H)     Protein Total 04/28/2025 6.3 (L)     Albumin 04/28/2025 3.3 (L)     Bilirubin Total 04/28/2025 0.9     INR 04/28/2025 1.16 (H)     MRSA Target DNA 04/28/2025 Negative     SA Target DNA 04/28/2025 Positive     WBC Count 04/28/2025 10.7     RBC Count 04/28/2025 3.56 (L)     Hemoglobin 04/28/2025 10.0 (L)     Hematocrit 04/28/2025 31.5 (L)     MCV 04/28/2025 89     MCH 04/28/2025 28.1     MCHC 04/28/2025 31.7     RDW 04/28/2025 12.9     Platelet Count 04/28/2025 266     % Neutrophils 04/28/2025 79     %  Lymphocytes 04/28/2025 13     % Monocytes 04/28/2025 6     % Eosinophils 04/28/2025 0     % Basophils 04/28/2025 0     % Immature Granulocytes 04/28/2025 1     NRBCs per 100 WBC 04/28/2025 0     Absolute Neutrophils 04/28/2025 8.4 (H)     Absolute Lymphocytes 04/28/2025 1.4     Absolute Monocytes 04/28/2025 0.7     Absolute Eosinophils 04/28/2025 0.0     Absolute Basophils 04/28/2025 0.0     Absolute Immature Granul* 04/28/2025 0.1     Absolute NRBCs 04/28/2025 0.0     Sodium 04/29/2025 141     Potassium 04/29/2025 3.3 (L)     Chloride 04/29/2025 106     Carbon Dioxide (CO2) 04/29/2025 26     Anion Gap 04/29/2025 9     Urea Nitrogen 04/29/2025 13.1     Creatinine 04/29/2025 0.94     GFR Estimate 04/29/2025 64     Calcium 04/29/2025 8.5 (L)     Glucose 04/29/2025 103 (H)     WBC Count 04/29/2025 10.8     RBC Count 04/29/2025 3.63 (L)     Hemoglobin 04/29/2025 10.2 (L)     Hematocrit 04/29/2025 33.2 (L)     MCV 04/29/2025 92     MCH 04/29/2025 28.1     MCHC 04/29/2025 30.7 (L)     RDW 04/29/2025 13.1     Platelet Count 04/29/2025 296     % Neutrophils 04/29/2025 78     % Lymphocytes 04/29/2025 13     % Monocytes 04/29/2025 7     % Eosinophils 04/29/2025 1     % Basophils 04/29/2025 0     % Immature Granulocytes 04/29/2025 1     NRBCs per 100 WBC 04/29/2025 0     Absolute Neutrophils 04/29/2025 8.4 (H)     Absolute Lymphocytes 04/29/2025 1.4     Absolute Monocytes 04/29/2025 0.8     Absolute Eosinophils 04/29/2025 0.1     Absolute Basophils 04/29/2025 0.0     Absolute Immature Granul* 04/29/2025 0.1     Absolute NRBCs 04/29/2025 0.0     Sodium 04/30/2025 141     Potassium 04/30/2025 3.5     Chloride 04/30/2025 109 (H)     Carbon Dioxide (CO2) 04/30/2025 24     Anion Gap 04/30/2025 8     Urea Nitrogen 04/30/2025 12.7     Creatinine 04/30/2025 0.96 (H)     GFR Estimate 04/30/2025 63     Calcium 04/30/2025 8.6 (L)     Glucose 04/30/2025 100 (H)     WBC Count 04/30/2025 8.7     RBC Count 04/30/2025 3.20 (L)     Hemoglobin  04/30/2025 9.1 (L)     Hematocrit 04/30/2025 29.1 (L)     MCV 04/30/2025 91     MCH 04/30/2025 28.4     MCHC 04/30/2025 31.3 (L)     RDW 04/30/2025 13.2     Platelet Count 04/30/2025 280     % Neutrophils 04/30/2025 75     % Lymphocytes 04/30/2025 14     % Monocytes 04/30/2025 8     % Eosinophils 04/30/2025 2     % Basophils 04/30/2025 1     % Immature Granulocytes 04/30/2025 1     NRBCs per 100 WBC 04/30/2025 0     Absolute Neutrophils 04/30/2025 6.5     Absolute Lymphocytes 04/30/2025 1.2     Absolute Monocytes 04/30/2025 0.7     Absolute Eosinophils 04/30/2025 0.2     Absolute Basophils 04/30/2025 0.0     Absolute Immature Granul* 04/30/2025 0.1     Absolute NRBCs 04/30/2025 0.0     Magnesium 04/30/2025 2.3     Sodium 05/01/2025 140     Potassium 05/01/2025 3.3 (L)     Carbon Dioxide (CO2) 05/01/2025 22     Anion Gap 05/01/2025 15     Urea Nitrogen 05/01/2025 12.0     Creatinine 05/01/2025 0.89     GFR Estimate 05/01/2025 69     Calcium 05/01/2025 8.9     Chloride 05/01/2025 103     Glucose 05/01/2025 77     Alkaline Phosphatase 05/01/2025 350 (H)     AST 05/01/2025 23     ALT 05/01/2025 65 (H)     Protein Total 05/01/2025 6.6     Albumin 05/01/2025 3.5     Bilirubin Total 05/01/2025 0.4     WBC Count 05/01/2025 9.1     RBC Count 05/01/2025 3.69 (L)     Hemoglobin 05/01/2025 10.4 (L)     Hematocrit 05/01/2025 33.2 (L)     MCV 05/01/2025 90     MCH 05/01/2025 28.2     MCHC 05/01/2025 31.3 (L)     RDW 05/01/2025 13.0     Platelet Count 05/01/2025 375     Potassium 05/01/2025 3.8     Hemoglobin 05/02/2025 10.0 (L)     Magnesium 05/02/2025 2.0     Potassium 05/02/2025 4.5     Glucose 05/02/2025 118 (H)     WBC Count 05/02/2025 9.0     RBC Count 05/02/2025 3.47 (L)     Hemoglobin 05/02/2025 10.0 (L)     Hematocrit 05/02/2025 30.7 (L)     MCV 05/02/2025 89     MCH 05/02/2025 28.8     MCHC 05/02/2025 32.6     RDW 05/02/2025 13.0     Platelet Count 05/02/2025 403     Creatinine 05/02/2025 0.90     GFR Estimate 05/02/2025  68     GLUCOSE BY METER POCT 05/03/2025 127 (H)           JAVIER Tracey CNP

## 2025-05-04 ENCOUNTER — APPOINTMENT (OUTPATIENT)
Dept: PHYSICAL THERAPY | Facility: HOSPITAL | Age: 70
DRG: 853 | End: 2025-05-04
Payer: MEDICARE

## 2025-05-04 ENCOUNTER — APPOINTMENT (OUTPATIENT)
Dept: CT IMAGING | Facility: HOSPITAL | Age: 70
DRG: 853 | End: 2025-05-04
Attending: RADIOLOGY
Payer: MEDICARE

## 2025-05-04 ENCOUNTER — APPOINTMENT (OUTPATIENT)
Dept: CT IMAGING | Facility: HOSPITAL | Age: 70
DRG: 853 | End: 2025-05-04
Attending: NURSE PRACTITIONER
Payer: MEDICARE

## 2025-05-04 LAB
ANION GAP SERPL CALCULATED.3IONS-SCNC: 6 MMOL/L (ref 7–15)
BUN SERPL-MCNC: 8.5 MG/DL (ref 8–23)
CALCIUM SERPL-MCNC: 8.3 MG/DL (ref 8.8–10.4)
CHLORIDE SERPL-SCNC: 97 MMOL/L (ref 98–107)
CREAT SERPL-MCNC: 0.79 MG/DL (ref 0.51–0.95)
EGFRCR SERPLBLD CKD-EPI 2021: 79 ML/MIN/1.73M2
ERYTHROCYTE [DISTWIDTH] IN BLOOD BY AUTOMATED COUNT: 13.1 % (ref 10–15)
GLUCOSE SERPL-MCNC: 113 MG/DL (ref 70–99)
HCO3 SERPL-SCNC: 29 MMOL/L (ref 22–29)
HCT VFR BLD AUTO: 31.3 % (ref 35–47)
HGB BLD-MCNC: 10 G/DL (ref 11.7–15.7)
MAGNESIUM SERPL-MCNC: 1.8 MG/DL (ref 1.7–2.3)
MCH RBC QN AUTO: 28.2 PG (ref 26.5–33)
MCHC RBC AUTO-ENTMCNC: 31.9 G/DL (ref 31.5–36.5)
MCV RBC AUTO: 88 FL (ref 78–100)
PHOSPHATE SERPL-MCNC: 2.4 MG/DL (ref 2.5–4.5)
PLATELET # BLD AUTO: 408 10E3/UL (ref 150–450)
POTASSIUM SERPL-SCNC: 3.2 MMOL/L (ref 3.4–5.3)
RBC # BLD AUTO: 3.55 10E6/UL (ref 3.8–5.2)
SODIUM SERPL-SCNC: 132 MMOL/L (ref 135–145)
WBC # BLD AUTO: 15.4 10E3/UL (ref 4–11)

## 2025-05-04 PROCEDURE — 85041 AUTOMATED RBC COUNT: CPT | Performed by: STUDENT IN AN ORGANIZED HEALTH CARE EDUCATION/TRAINING PROGRAM

## 2025-05-04 PROCEDURE — 250N000011 HC RX IP 250 OP 636: Performed by: NURSE PRACTITIONER

## 2025-05-04 PROCEDURE — 250N000013 HC RX MED GY IP 250 OP 250 PS 637: Performed by: NURSE PRACTITIONER

## 2025-05-04 PROCEDURE — 250N000011 HC RX IP 250 OP 636: Mod: JZ | Performed by: NURSE PRACTITIONER

## 2025-05-04 PROCEDURE — 99232 SBSQ HOSP IP/OBS MODERATE 35: CPT | Performed by: STUDENT IN AN ORGANIZED HEALTH CARE EDUCATION/TRAINING PROGRAM

## 2025-05-04 PROCEDURE — 36415 COLL VENOUS BLD VENIPUNCTURE: CPT | Performed by: STUDENT IN AN ORGANIZED HEALTH CARE EDUCATION/TRAINING PROGRAM

## 2025-05-04 PROCEDURE — 250N000013 HC RX MED GY IP 250 OP 250 PS 637

## 2025-05-04 PROCEDURE — 82310 ASSAY OF CALCIUM: CPT | Performed by: STUDENT IN AN ORGANIZED HEALTH CARE EDUCATION/TRAINING PROGRAM

## 2025-05-04 PROCEDURE — 74177 CT ABD & PELVIS W/CONTRAST: CPT

## 2025-05-04 PROCEDURE — 97116 GAIT TRAINING THERAPY: CPT | Mod: GP

## 2025-05-04 PROCEDURE — 258N000003 HC RX IP 258 OP 636: Performed by: STUDENT IN AN ORGANIZED HEALTH CARE EDUCATION/TRAINING PROGRAM

## 2025-05-04 PROCEDURE — 87075 CULTR BACTERIA EXCEPT BLOOD: CPT | Performed by: INTERNAL MEDICINE

## 2025-05-04 PROCEDURE — 84100 ASSAY OF PHOSPHORUS: CPT | Performed by: STUDENT IN AN ORGANIZED HEALTH CARE EDUCATION/TRAINING PROGRAM

## 2025-05-04 PROCEDURE — 120N000001 HC R&B MED SURG/OB

## 2025-05-04 PROCEDURE — 250N000013 HC RX MED GY IP 250 OP 250 PS 637: Performed by: INTERNAL MEDICINE

## 2025-05-04 PROCEDURE — 0W9J30Z DRAINAGE OF PELVIC CAVITY WITH DRAINAGE DEVICE, PERCUTANEOUS APPROACH: ICD-10-PCS | Performed by: RADIOLOGY

## 2025-05-04 PROCEDURE — 99232 SBSQ HOSP IP/OBS MODERATE 35: CPT | Performed by: INTERNAL MEDICINE

## 2025-05-04 PROCEDURE — G0545 PR INHRENT VISIT TO INPT/OBS W CNFRM/SUSPCT INFCT DIS BY INFCT DIS SPCIALST: HCPCS | Performed by: INTERNAL MEDICINE

## 2025-05-04 PROCEDURE — C1769 GUIDE WIRE: HCPCS

## 2025-05-04 PROCEDURE — 250N000011 HC RX IP 250 OP 636: Performed by: INTERNAL MEDICINE

## 2025-05-04 PROCEDURE — 97530 THERAPEUTIC ACTIVITIES: CPT | Mod: GP

## 2025-05-04 PROCEDURE — 87186 SC STD MICRODIL/AGAR DIL: CPT | Performed by: INTERNAL MEDICINE

## 2025-05-04 PROCEDURE — 87102 FUNGUS ISOLATION CULTURE: CPT | Performed by: INTERNAL MEDICINE

## 2025-05-04 PROCEDURE — 83735 ASSAY OF MAGNESIUM: CPT | Performed by: STUDENT IN AN ORGANIZED HEALTH CARE EDUCATION/TRAINING PROGRAM

## 2025-05-04 PROCEDURE — 250N000011 HC RX IP 250 OP 636

## 2025-05-04 PROCEDURE — 250N000009 HC RX 250: Performed by: STUDENT IN AN ORGANIZED HEALTH CARE EDUCATION/TRAINING PROGRAM

## 2025-05-04 RX ORDER — HEPARIN SODIUM 200 [USP'U]/100ML
1 INJECTION, SOLUTION INTRAVENOUS CONTINUOUS PRN
Status: DISCONTINUED | OUTPATIENT
Start: 2025-05-04 | End: 2025-05-05

## 2025-05-04 RX ORDER — FENTANYL CITRATE 50 UG/ML
25-50 INJECTION, SOLUTION INTRAMUSCULAR; INTRAVENOUS EVERY 5 MIN PRN
Status: DISCONTINUED | OUTPATIENT
Start: 2025-05-04 | End: 2025-05-05

## 2025-05-04 RX ORDER — FLUMAZENIL 0.1 MG/ML
0.2 INJECTION, SOLUTION INTRAVENOUS
Status: DISCONTINUED | OUTPATIENT
Start: 2025-05-04 | End: 2025-05-05

## 2025-05-04 RX ORDER — NALOXONE HYDROCHLORIDE 0.4 MG/ML
0.2 INJECTION, SOLUTION INTRAMUSCULAR; INTRAVENOUS; SUBCUTANEOUS
Status: DISCONTINUED | OUTPATIENT
Start: 2025-05-04 | End: 2025-05-08 | Stop reason: HOSPADM

## 2025-05-04 RX ORDER — IOPAMIDOL 755 MG/ML
82 INJECTION, SOLUTION INTRAVASCULAR ONCE
Status: COMPLETED | OUTPATIENT
Start: 2025-05-04 | End: 2025-05-04

## 2025-05-04 RX ORDER — NALOXONE HYDROCHLORIDE 0.4 MG/ML
0.4 INJECTION, SOLUTION INTRAMUSCULAR; INTRAVENOUS; SUBCUTANEOUS
Status: DISCONTINUED | OUTPATIENT
Start: 2025-05-04 | End: 2025-05-08 | Stop reason: HOSPADM

## 2025-05-04 RX ORDER — KETOROLAC TROMETHAMINE 15 MG/ML
15 INJECTION, SOLUTION INTRAMUSCULAR; INTRAVENOUS EVERY 6 HOURS
Status: COMPLETED | OUTPATIENT
Start: 2025-05-04 | End: 2025-05-05

## 2025-05-04 RX ADMIN — ENOXAPARIN SODIUM 40 MG: 40 INJECTION SUBCUTANEOUS at 07:41

## 2025-05-04 RX ADMIN — ACETAMINOPHEN 500 MG: 500 TABLET ORAL at 01:27

## 2025-05-04 RX ADMIN — ACETAMINOPHEN 500 MG: 500 TABLET ORAL at 17:13

## 2025-05-04 RX ADMIN — PIPERACILLIN AND TAZOBACTAM 3.38 G: 3; .375 INJECTION, POWDER, FOR SOLUTION INTRAVENOUS at 06:01

## 2025-05-04 RX ADMIN — ATORVASTATIN CALCIUM 20 MG: 10 TABLET, FILM COATED ORAL at 21:09

## 2025-05-04 RX ADMIN — POTASSIUM PHOSPHATE, MONOBASIC POTASSIUM PHOSPHATE, DIBASIC 15 MMOL: 224; 236 INJECTION, SOLUTION, CONCENTRATE INTRAVENOUS at 11:19

## 2025-05-04 RX ADMIN — ACETAMINOPHEN 500 MG: 500 TABLET ORAL at 11:41

## 2025-05-04 RX ADMIN — ACETAMINOPHEN 500 MG: 500 TABLET ORAL at 05:59

## 2025-05-04 RX ADMIN — POLYETHYLENE GLYCOL 3350 17 G: 17 POWDER, FOR SOLUTION ORAL at 22:22

## 2025-05-04 RX ADMIN — PANTOPRAZOLE SODIUM 40 MG: 40 INJECTION, POWDER, FOR SOLUTION INTRAVENOUS at 10:18

## 2025-05-04 RX ADMIN — FENTANYL CITRATE 50 MCG: 50 INJECTION, SOLUTION INTRAMUSCULAR; INTRAVENOUS at 14:49

## 2025-05-04 RX ADMIN — KETOROLAC TROMETHAMINE 15 MG: 15 INJECTION, SOLUTION INTRAMUSCULAR; INTRAVENOUS at 19:44

## 2025-05-04 RX ADMIN — PIPERACILLIN AND TAZOBACTAM 3.38 G: 3; .375 INJECTION, POWDER, FOR SOLUTION INTRAVENOUS at 22:18

## 2025-05-04 RX ADMIN — HYDROMORPHONE HYDROCHLORIDE 2 MG: 2 TABLET ORAL at 05:59

## 2025-05-04 RX ADMIN — FLUCONAZOLE 200 MG: 200 TABLET ORAL at 09:31

## 2025-05-04 RX ADMIN — HYDROMORPHONE HYDROCHLORIDE 4 MG: 4 TABLET ORAL at 22:18

## 2025-05-04 RX ADMIN — HYDROMORPHONE HYDROCHLORIDE 2 MG: 2 TABLET ORAL at 16:16

## 2025-05-04 RX ADMIN — KETOROLAC TROMETHAMINE 15 MG: 15 INJECTION, SOLUTION INTRAMUSCULAR; INTRAVENOUS at 06:51

## 2025-05-04 RX ADMIN — KETOROLAC TROMETHAMINE 15 MG: 15 INJECTION, SOLUTION INTRAMUSCULAR; INTRAVENOUS at 01:28

## 2025-05-04 RX ADMIN — PIPERACILLIN AND TAZOBACTAM 3.38 G: 3; .375 INJECTION, POWDER, FOR SOLUTION INTRAVENOUS at 10:24

## 2025-05-04 RX ADMIN — HYDROMORPHONE HYDROCHLORIDE 2 MG: 2 TABLET ORAL at 11:40

## 2025-05-04 RX ADMIN — PIPERACILLIN AND TAZOBACTAM 3.38 G: 3; .375 INJECTION, POWDER, FOR SOLUTION INTRAVENOUS at 17:14

## 2025-05-04 RX ADMIN — FENTANYL CITRATE 50 MCG: 50 INJECTION, SOLUTION INTRAMUSCULAR; INTRAVENOUS at 14:42

## 2025-05-04 RX ADMIN — IOPAMIDOL 82 ML: 755 INJECTION, SOLUTION INTRAVENOUS at 08:23

## 2025-05-04 RX ADMIN — LEVOTHYROXINE SODIUM 75 MCG: 0.03 TABLET ORAL at 06:51

## 2025-05-04 ASSESSMENT — ACTIVITIES OF DAILY LIVING (ADL)
ADLS_ACUITY_SCORE: 38
ADLS_ACUITY_SCORE: 38
ADLS_ACUITY_SCORE: 37
ADLS_ACUITY_SCORE: 38
ADLS_ACUITY_SCORE: 38
ADLS_ACUITY_SCORE: 37
ADLS_ACUITY_SCORE: 38
ADLS_ACUITY_SCORE: 38
ADLS_ACUITY_SCORE: 37
ADLS_ACUITY_SCORE: 38
ADLS_ACUITY_SCORE: 37

## 2025-05-04 NOTE — PLAN OF CARE
Problem: Pain Acute  Goal: Optimal Pain Control and Function  Outcome: Progressing  Intervention: Develop Pain Management Plan  Recent Flowsheet Documentation  Taken 5/3/2025 1700 by Rj Conroy, RN  Pain Management Interventions: medication (see MAR)  Intervention: Prevent or Manage Pain  Recent Flowsheet Documentation  Taken 5/3/2025 1700 by Rj Conroy, RN  Medication Review/Management: medications reviewed   Goal Outcome Evaluation:    Pt requesting for prn Dilaudid q 4 hr.  Also c/o gas pain.  Gave prn Simethicone.  BEKA drain patent.  Will cont to monitor.

## 2025-05-04 NOTE — IR NOTE
Patient Name: Michelle Isaac  Medical Record Number: [id number]  Today's Date: 5/4/2025    Procedure: Abscess Drain  Proceduralist: Dr. Shaw    Sedation medications administered: 2 mg midazolam and 100 mcg fentanyl   Sedation time: 15 minutes    Report given to: P2 RN

## 2025-05-04 NOTE — PROCEDURES
[facility]    Procedure: IR Procedure Note    Date/Time: 5/4/2025 2:59 PM    Performed by: Yvan Shaw MD  Authorized by: Yvan Shaw MD  IR Fellow Physician:    Pre Procedure Diagnosis: CT drainage pelvic abscess  Post Procedure Diagnosis: same    UNIVERSAL PROTOCOL   Site Marked: Yes  Prior Images Obtained and Reviewed:  Yes  Required items: Required blood products, implants, devices and special equipment available    Patient identity confirmed:  Verbally with patient, hospital-assigned identification number, arm band and provided demographic data  Patient was reevaluated immediately before administering moderate or deep sedation or anesthesia  Confirmation Checklist:  Correct equipment/implants were available, procedure was appropriate and matched the consent or emergent situation and patient's identity using two indicators  Time out: Immediately prior to the procedure a time out was called    Universal Protocol: the Joint Commission Universal Protocol was followed    Preparation: Patient was prepped and draped in usual sterile fashion      SEDATION  Patient Sedated: Yes    Vital signs: Vital signs monitored during sedation    Findings: LLQ access, 12 Fr locking drain placed into pelvic fluid collection. Turbid yellow fluid sent to lab.    Procedural Complications: None    Plan: BEKA suction with saline flushes. Anticipate CT and abscessogram later this week.      PROCEDURE  Describe Procedure: LLQ access, 12 Fr locking drain placed into pelvic fluid collection. Turbid yellow fluid sent to lab.  Length of time physician/provider present for 1:1 monitoring during sedation:  0-22 min

## 2025-05-04 NOTE — PLAN OF CARE
Goal Outcome Evaluation:      Plan of Care Reviewed With: patient    Overall Patient Progress: improvingOverall Patient Progress: improving    Outcome Evaluation: Pt stooling with +BS. C/O abdominal pain rated 4/10. She is anxious about pain management. Abdominal incisions healing. BEKA drain draining serosanguinous fluid. Her orthotic is on while she is in bed.    /55 (BP Location: Left arm)   Pulse 74   Temp 97.8  F (36.6  C) (Oral)   Resp 16   Wt 76.2 kg (167 lb 15.9 oz)   SpO2 96%   BMI 28.39 kg/m      Problem: Adult Inpatient Plan of Care  Goal: Optimal Comfort and Wellbeing  Intervention: Monitor Pain and Promote Comfort  Recent Flowsheet Documentation  Taken 5/4/2025 0128 by Alicia Weinberg RN  Pain Management Interventions:   medication (see MAR)   awakened for pain meds per patient request   care clustered   emotional support     Problem: Delirium  Goal: Optimal Coping  Outcome: Not Progressing     Problem: Delirium  Goal: Improved Attention and Thought Clarity  Outcome: Not Progressing     Problem: Pain Acute  Goal: Optimal Pain Control and Function  Intervention: Develop Pain Management Plan  Recent Flowsheet Documentation  Taken 5/4/2025 0128 by Alicia Weinberg RN  Pain Management Interventions:   medication (see MAR)   awakened for pain meds per patient request   care clustered   emotional support

## 2025-05-04 NOTE — PROGRESS NOTES
"ASSESSMENT:  1. Gluteal tendinitis of right buttock    2. Diverticulitis of large intestine with perforation without abscess, unspecified bleeding status        Michelle Isaac is a 70 year old female who is s/p laparoscopic abdominal washout on 4/30/25. Although patient feels much better today and reports that she is ready to go home, she has developed a significant leukocytosis. Her drain also looks a bit more turbid than it did yesterday, so we will have a CT scan of her abdomen performed.    PLAN:  NPO until after CT results are back  Continue pain regimen with scheduled adjuncts and current dosing   Await CT scan to address discharge status; likely staying at least one more day to recheck WBC    Addendum:  Left pelvic abscess noted; IR consulted and spoke with Dr. Shaw. They will try to get a drain placed in the abscess today.  NPO for drain placement  Repeat Wbc in AM    SUBJECTIVE:   She is feeling \"the best she has felt\". States that scheduling the tylenol and the toradol was a \"game changer\". She is tolerating a diet and having non-bloody bowel movements. She denies fever, chills, nausea or vomiting      Patient Vitals for the past 24 hrs:   BP Temp Temp src Pulse Resp SpO2   05/04/25 0918 133/62 98.9  F (37.2  C) Oral 77 16 98 %   05/04/25 0210 114/55 97.8  F (36.6  C) Oral 74 16 96 %   05/04/25 0128 -- -- -- 76 16 --   05/03/25 1551 127/69 98.1  F (36.7  C) Oral 82 18 96 %         PHYSICAL EXAM:  GEN: No acute distress, comfortable  LUNGS: CTA bilaterally  CV:RRR  ABD:soft, not tender, but mildly distended  Drains: turbid serosanguineous   Output by Drain (mL) 05/02/25 0700 - 05/02/25 1459 05/02/25 1500 - 05/02/25 2259 05/02/25 2300 - 05/03/25 0659 05/03/25 0700 - 05/03/25 1459 05/03/25 1500 - 05/03/25 2259 05/03/25 2300 - 05/04/25 0659 05/04/25 0700 - 05/04/25 1212   Drain Closed/Suction 1 Left Abdomen Bulb 19 [place]  15 5 40 60  20      EXT:No cyanosis, edema or obvious abnormalities    05/03 0700 " - 05/04 0659  In: 1200 [P.O.:1200]  Out: 1700 [Urine:1500; Drains:100]    No results displayed because visit has over 200 results.             JAVIER Tracey CNP

## 2025-05-04 NOTE — PLAN OF CARE
Problem: Adult Inpatient Plan of Care  Goal: Plan of Care Review  Description: The Plan of Care Review/Shift note should be completed every shift.  The Outcome Evaluation is a brief statement about your assessment that the patient is improving, declining, or no change.  This information will be displayed automatically on your shiftnote.  Outcome: Progressing   Goal Outcome Evaluation:    Pt had a increase in her white blood cell count, CT of abdomen done. Pt is having a    abscess drain with catheter placement this afternoon. Abdominal pain has been controlled with scheduled tylenol, toradol and prn oral dilaudid.

## 2025-05-04 NOTE — PROGRESS NOTES
"[facility] Progress Note    SUBJECTIVE:  some LLQ pain.  White count up, CT new abscess.  Plan is IR drain.  See my orders.       REVIEW OF SYSTEMS:  Negative unless as listed above.  Social history, Family history, Medications: reviewed.    OBJECTIVE:  /62 (BP Location: Left arm)   Pulse 77   Temp 98.9  F (37.2  C) (Oral)   Resp 16   Wt 76.2 kg (167 lb 15.9 oz)   SpO2 98%   BMI 28.39 kg/m                PHYSICAL EXAM:  Alert, awake  Vitals tabulated above, reviewed  Neck supple without lymphadenopathy  Sclera normal color, not injected  CARDIOVASCULAR regular rate and rhythm, no murmur  Lungs CLEAR TO AUSCULTATION   Abdomen pain midline to LLQ  Skin normal  Joints normal  Neurologic exam non focal    Antibiotics:z    Pertinent labs:    Recent Labs   Lab Test 05/04/25  0627 05/02/25  0656 05/01/25  0713   WBC 15.4* 9.0 9.1   HGB 10.0* 10.0*  10.0* 10.4*   HCT 31.3* 30.7* 33.2*   MCV 88 89 90    403 375       Lab Results   Component Value Date    RBC 3.47 05/02/2025     Lab Results   Component Value Date    HGB 10.0 05/02/2025    HGB 10.0 05/02/2025     Lab Results   Component Value Date    HCT 30.7 05/02/2025     No components found for: \"MCT\"  Lab Results   Component Value Date    MCV 89 05/02/2025     Lab Results   Component Value Date    MCH 28.8 05/02/2025     Lab Results   Component Value Date    MCHC 32.6 05/02/2025     Lab Results   Component Value Date    RDW 13.0 05/02/2025     Lab Results   Component Value Date     05/02/2025       Last Comprehensive Metabolic Panel:  Sodium   Date Value Ref Range Status   05/04/2025 132 (L) 135 - 145 mmol/L Final     Potassium   Date Value Ref Range Status   05/04/2025 3.2 (L) 3.4 - 5.3 mmol/L Final     Chloride   Date Value Ref Range Status   05/04/2025 97 (L) 98 - 107 mmol/L Final     Carbon Dioxide (CO2)   Date Value Ref Range Status   05/04/2025 29 22 - 29 mmol/L Final     Anion Gap   Date Value Ref Range Status   05/04/2025 " "6 (L) 7 - 15 mmol/L Final     Glucose   Date Value Ref Range Status   05/04/2025 113 (H) 70 - 99 mg/dL Final     GLUCOSE BY METER POCT   Date Value Ref Range Status   05/03/2025 127 (H) 70 - 99 mg/dL Final     Urea Nitrogen   Date Value Ref Range Status   05/04/2025 8.5 8.0 - 23.0 mg/dL Final     Creatinine   Date Value Ref Range Status   05/04/2025 0.79 0.51 - 0.95 mg/dL Final     GFR Estimate   Date Value Ref Range Status   05/04/2025 79 >60 mL/min/1.73m2 Final     Comment:     eGFR calculated using 2021 CKD-EPI equation.     Calcium   Date Value Ref Range Status   05/04/2025 8.3 (L) 8.8 - 10.4 mg/dL Final       Liver Function Studies -   Recent Labs   Lab Test 05/01/25  0713   PROTTOTAL 6.6   ALBUMIN 3.5   BILITOTAL 0.4   ALKPHOS 350*   AST 23   ALT 65*       No results found for: \"SED\"    No results found for: \"CRP\"            MICROBIOLOGY DATA:        IMAGING/RADIOLOGY:                                                                       1.  New left pelvic abscess measuring 9.9 x 4.9 cm.  2.  Decreased in size posterior pelvic abscess (2.1 x 6.9 cm, previously 4 x 7.8 cm).  3.  Slight improved sigmoid colonic wall thickening with persistent small to moderate volume pneumoperitoneum.      ASSESSMENT:  Peritonitis, post washout with operative note finding abscess(es)  New abscess now post op, manifested in the classic way with rising white count while on abx.    RECOMMENDATION:  Zosyn/fluc  I did order cxs to be done after IR drains sample  Will follow  SHERRI Frausto MD  Office [phone] option 2 to desk staff  "

## 2025-05-04 NOTE — PROGRESS NOTES
[facility]    Medicine Progress Note - Hospitalist Service    Date of Admission:  4/27/2025    Assessment & Plan   70-year-old female with recent surgery to RLE presented with abdominal pain and found to have perforated diverticulitis.    #Sigmoid colon diverticulitis with perforation;  - CT with new abscess 5/1 - Underwent laparoscopic washout with drain placement.  - WBC elevated and CT A/P on 5/4/25 showing new left pelvic abscess.  Plan  - General surgery and ID following, appreciate management  - IR consulted 5/4 for new abscess on imaging  - NPO for IR drain #2 placement.  - Surgery planning to keep drain #1 on discharge  - On IV Zosyn and fluconazole - defer to antibiotic management to ID  - On scheduled Tylenol, scheduled Toradol, as needed narcotics.    #Recent RLE tendon repair and trochanteric bursectomy on 4/22/25;  CT shows fluid collection.  Previous hospitalist discussed with Ortho PA and they are not concerned for abscess  Continue activity level as instructed by orthopedic team  -Brace at all times.  - Flatfoot weightbearing to the right lower extremity, crutches to be used when ambulating  - No active hip abduction and internal rotation.  No passive hip external rotation and abduction    #Acute blood loss anemia;  Likely related to recent surgery, sepsis, hemodilution  Hemoglobin fairly stable.    Physical deconditioning due to medical illness; PT    Transaminitis on admission, uncertain etiology, recheck labs AST, ALT trending down          Diet: Diet  Low Fiber Diet    DVT Prophylaxis: Pneumatic Compression Devices and holding chemoprophylaxis with IR procedure planned  Luz Catheter: Not present  Lines: None     Cardiac Monitoring: None  Code Status: Full Code      Clinically Significant Risk Factors        # Hypokalemia: Lowest K = 3.2 mmol/L in last 2 days, will replace as needed  # Hyponatremia: Lowest Na = 132 mmol/L in last 2 days, will monitor as appropriate  #  "Hypochloremia: Lowest Cl = 97 mmol/L in last 2 days, will monitor as appropriate      # Hypoalbuminemia: Lowest albumin = 3.3 g/dL at 4/28/2025  6:14 AM, will monitor as appropriate                # Overweight: Estimated body mass index is 28.39 kg/m  as calculated from the following:    Height as of 4/22/25: 1.638 m (5' 4.5\").    Weight as of this encounter: 76.2 kg (167 lb 15.9 oz).             Social Drivers of Health    Housing Stability: High Risk (4/28/2025)    Housing Stability     Do you have housing? : No     Are you worried about losing your housing?: No   Physical Activity: Insufficiently Active (3/16/2024)    Received from [facility]    Exercise Vital Sign     Days of Exercise per Week: 3 days     Minutes of Exercise per Session: 30 min          Disposition Plan     Medically Ready for Discharge: Anticipated in 2-4 Days             Wiliam Ortega MD  Hospitalist Service  [facility]  Securely message with [employer] (more info)  Text page via [employer] Paging/Directory   ______________________________________________________________________    Interval History   - WBC elevated this morning, Surgery ordered CT A/P showing new abscess - IR consulted for drainage.    Physical Exam   Vital Signs: Temp: 98.2  F (36.8  C) Temp src: Oral BP: 131/56 Pulse: 71   Resp: 20 SpO2: 98 % O2 Device: None (Room air)    Weight: 167 lbs 15.85 oz    General: Alert and Oriented x3. Answers questions appropriately. Follows commands.  Resp: Breath sounds equal throughout. No crackles. No wheezing.  Cardio: Regular rate and rhythm. No murmurs. No friction rub.  Abd: Soft. Non-distended. Diffuse abdominal tenderness.  MSK: No areas of ecchymosis or erythema.  Neuro: CN 2-12 grossly intact. Strength 5/5 in all 4 extremities.  Skin:No rashes. No jaundice.       Medical Decision Making       45 MINUTES SPENT BY ME on the date of service doing chart review, history, exam, documentation & further activities per the " note.  MANAGEMENT DISCUSSED with the following over the past 24 hours: RNMARTÍNEZ   Tests ORDERED & REVIEWED in the past 24 hours:  - BMP  - CBC  - Magnesium  - Phosphorus  Medical complexity over the past 24 hours:  - Prescription DRUG MANAGEMENT performed      Data     I have personally reviewed the following data over the past 24 hrs:    15.4 (H)  \   10.0 (L)   / 408     132 (L) 97 (L) 8.5 /  113 (H)   3.2 (L) 29 0.79 \       Imaging results reviewed over the past 24 hrs:   Recent Results (from the past 24 hours)   CT Abdomen Pelvis w Contrast    Narrative    EXAM: CT ABDOMEN AND PELVIS WITH CONTRAST  LOCATION: [facility]  DATE: 05/04/2025    INDICATION: Significant WBC increase POD3 with known abdominal infection; assess for new fluid collections.  COMPARISON: CT abdomen and pelvis 04/30/2025.  TECHNIQUE: CT scan of the abdomen and pelvis was performed following injection of IV contrast. Multiplanar reformats were obtained. Dose reduction techniques were used.  CONTRAST: Isovue 370, 82 mL.    FINDINGS:   LOWER CHEST: Small hiatal hernia.    HEPATOBILIARY: Normal.    PANCREAS: Normal.    SPLEEN: Normal.    ADRENAL GLANDS: Normal.    KIDNEYS/BLADDER: Normal.    BOWEL: Slight improved thickening of the sigmoid colon. Liquid stool throughout the colon. No bowel obstruction. Mural fat deposition within a few loops of terminal ileum likely reflect chronic inflammation.    LYMPH NODES/PERITONEUM: New left pelvic gas and fluid collection measuring 9.9 x 4.9 cm (3/156).     Decreased in size posterior pelvic fluid collection measuring 2.1 x 6.9 cm, previously 4 x 7.8 cm (3/178). Surgical drain terminates in the pelvis just anterior to the aforementioned pelvic collection.    Small to moderate volume pneumoperitoneum.    VASCULATURE: Normal.    PELVIC ORGANS: Unremarkable.    MUSCULOSKELETAL: Trace abdominal wall subcutaneous gas. Resolved gas within a right lateral thigh fluid collection measuring  approximately 4.1 x 2.6 cm.       Impression    IMPRESSION:   1.  New left pelvic abscess measuring 9.9 x 4.9 cm.  2.  Decreased in size posterior pelvic abscess (2.1 x 6.9 cm, previously 4 x 7.8 cm).  3.  Slight improved sigmoid colonic wall thickening with persistent small to moderate volume pneumoperitoneum.     CT Abdomen Peritoneum Abscess Drain w Cath Place    Narrative    EXAM:  1. PERCUTANEOUS DRAIN PLACEMENT PELVIC ABSCESS  2. CT GUIDANCE  3. CONSCIOUS SEDATION  LOCATION: [facility]  DATE: 5/4/2025    INDICATION: post op pelvic abscess  TECHNIQUE: Dose reduction techniques were used.    PROCEDURE: Informed consent obtained. Site marked. Prior images reviewed. Required items made available. Patient identity confirmed verbally and with arm band. Patient reevaluated immediately before administering sedation. Universal protocol was   followed. Time out performed. The left lower quadrant access site was prepped and draped in sterile fashion. 10 mL of 1% lidocaine was infused into the local soft tissues. Using standard technique and under direct CT guidance, a 12 Malian drainage   catheter was inserted into the fluid collection.      SPECIMEN: 20 mL of turbid yellow fluid was aspirated and sent to lab for cultures and Gram stain.    BLOOD LOSS: Minimal.    The patient tolerated the procedure well. No immediate complications.    SEDATION: Versed 2 mg. Fentanyl 100 mcg. The procedure was performed with administration intravenous conscious sedation with appropriate preoperative, intraoperative, and postoperative evaluation.    15 minutes of supervised face to face conscious sedation time was provided by a radiology nurse under my direct supervision.      Impression    IMPRESSION:  1.  Successful CT-guided drain placement into left pelvic fluid collection/abscess.    Reference CPT Codes: 17937, 94247

## 2025-05-04 NOTE — PRE-PROCEDURE
GENERAL PRE-PROCEDURE:   Procedure:  Pelvic abscess drain  Date/Time:  5/4/2025 2:27 PM    Written consent obtained?: Yes    Risks and benefits: Risks, benefits and alternatives were discussed    Consent given by:  Patient  Patient states understanding of procedure being performed: Yes    Patient's understanding of procedure matches consent: Yes    Procedure consent matches procedure scheduled: Yes    Expected level of sedation:  Moderate  Appropriately NPO:  Yes  ASA Class:  3  Mallampati  :  Grade 2- soft palate, base of uvula, tonsillar pillars, and portion of posterior pharyngeal wall visible  Lungs:  Lungs clear with good breath sounds bilaterally  Heart:  Normal heart sounds and rate  History & Physical reviewed:  History and physical reviewed and no updates needed  Statement of review:  I have reviewed the lab findings, diagnostic data, medications, and the plan for sedation

## 2025-05-05 ENCOUNTER — APPOINTMENT (OUTPATIENT)
Dept: PHYSICAL THERAPY | Facility: HOSPITAL | Age: 70
DRG: 853 | End: 2025-05-05
Payer: MEDICARE

## 2025-05-05 LAB
ANION GAP SERPL CALCULATED.3IONS-SCNC: 8 MMOL/L (ref 7–15)
BUN SERPL-MCNC: 5.8 MG/DL (ref 8–23)
CALCIUM SERPL-MCNC: 7.9 MG/DL (ref 8.8–10.4)
CHLORIDE SERPL-SCNC: 100 MMOL/L (ref 98–107)
CREAT SERPL-MCNC: 0.8 MG/DL (ref 0.51–0.95)
EGFRCR SERPLBLD CKD-EPI 2021: 78 ML/MIN/1.73M2
ERYTHROCYTE [DISTWIDTH] IN BLOOD BY AUTOMATED COUNT: 13.3 % (ref 10–15)
GLUCOSE SERPL-MCNC: 115 MG/DL (ref 70–99)
HCO3 SERPL-SCNC: 26 MMOL/L (ref 22–29)
HCT VFR BLD AUTO: 29 % (ref 35–47)
HGB BLD-MCNC: 9 G/DL (ref 11.7–15.7)
MAGNESIUM SERPL-MCNC: 1.7 MG/DL (ref 1.7–2.3)
MCH RBC QN AUTO: 27.7 PG (ref 26.5–33)
MCHC RBC AUTO-ENTMCNC: 31 G/DL (ref 31.5–36.5)
MCV RBC AUTO: 89 FL (ref 78–100)
PHOSPHATE SERPL-MCNC: 2.9 MG/DL (ref 2.5–4.5)
PLATELET # BLD AUTO: 415 10E3/UL (ref 150–450)
POTASSIUM SERPL-SCNC: 3.2 MMOL/L (ref 3.4–5.3)
RBC # BLD AUTO: 3.25 10E6/UL (ref 3.8–5.2)
SODIUM SERPL-SCNC: 134 MMOL/L (ref 135–145)
WBC # BLD AUTO: 15.2 10E3/UL (ref 4–11)

## 2025-05-05 PROCEDURE — 83735 ASSAY OF MAGNESIUM: CPT | Performed by: STUDENT IN AN ORGANIZED HEALTH CARE EDUCATION/TRAINING PROGRAM

## 2025-05-05 PROCEDURE — 85027 COMPLETE CBC AUTOMATED: CPT | Performed by: STUDENT IN AN ORGANIZED HEALTH CARE EDUCATION/TRAINING PROGRAM

## 2025-05-05 PROCEDURE — 99232 SBSQ HOSP IP/OBS MODERATE 35: CPT | Performed by: STUDENT IN AN ORGANIZED HEALTH CARE EDUCATION/TRAINING PROGRAM

## 2025-05-05 PROCEDURE — 250N000013 HC RX MED GY IP 250 OP 250 PS 637

## 2025-05-05 PROCEDURE — G0545 PR INHRENT VISIT TO INPT/OBS W CNFRM/SUSPCT INFCT DIS BY INFCT DIS SPCIALST: HCPCS | Performed by: INTERNAL MEDICINE

## 2025-05-05 PROCEDURE — 250N000013 HC RX MED GY IP 250 OP 250 PS 637: Performed by: NURSE PRACTITIONER

## 2025-05-05 PROCEDURE — 250N000013 HC RX MED GY IP 250 OP 250 PS 637: Performed by: INTERNAL MEDICINE

## 2025-05-05 PROCEDURE — 99232 SBSQ HOSP IP/OBS MODERATE 35: CPT | Performed by: INTERNAL MEDICINE

## 2025-05-05 PROCEDURE — 250N000013 HC RX MED GY IP 250 OP 250 PS 637: Performed by: STUDENT IN AN ORGANIZED HEALTH CARE EDUCATION/TRAINING PROGRAM

## 2025-05-05 PROCEDURE — 120N000001 HC R&B MED SURG/OB

## 2025-05-05 PROCEDURE — 258N000003 HC RX IP 258 OP 636: Performed by: STUDENT IN AN ORGANIZED HEALTH CARE EDUCATION/TRAINING PROGRAM

## 2025-05-05 PROCEDURE — 250N000011 HC RX IP 250 OP 636: Mod: JZ | Performed by: NURSE PRACTITIONER

## 2025-05-05 PROCEDURE — 250N000011 HC RX IP 250 OP 636

## 2025-05-05 PROCEDURE — 250N000009 HC RX 250: Performed by: STUDENT IN AN ORGANIZED HEALTH CARE EDUCATION/TRAINING PROGRAM

## 2025-05-05 PROCEDURE — 97530 THERAPEUTIC ACTIVITIES: CPT | Mod: GP

## 2025-05-05 PROCEDURE — 250N000011 HC RX IP 250 OP 636: Performed by: STUDENT IN AN ORGANIZED HEALTH CARE EDUCATION/TRAINING PROGRAM

## 2025-05-05 PROCEDURE — 99231 SBSQ HOSP IP/OBS SF/LOW 25: CPT | Mod: 24

## 2025-05-05 PROCEDURE — 82310 ASSAY OF CALCIUM: CPT | Performed by: STUDENT IN AN ORGANIZED HEALTH CARE EDUCATION/TRAINING PROGRAM

## 2025-05-05 PROCEDURE — 84100 ASSAY OF PHOSPHORUS: CPT | Performed by: STUDENT IN AN ORGANIZED HEALTH CARE EDUCATION/TRAINING PROGRAM

## 2025-05-05 PROCEDURE — 97116 GAIT TRAINING THERAPY: CPT | Mod: GP

## 2025-05-05 PROCEDURE — 36415 COLL VENOUS BLD VENIPUNCTURE: CPT | Performed by: STUDENT IN AN ORGANIZED HEALTH CARE EDUCATION/TRAINING PROGRAM

## 2025-05-05 PROCEDURE — 97110 THERAPEUTIC EXERCISES: CPT | Mod: GP

## 2025-05-05 RX ORDER — MAGNESIUM SULFATE HEPTAHYDRATE 40 MG/ML
2 INJECTION, SOLUTION INTRAVENOUS ONCE
Status: COMPLETED | OUTPATIENT
Start: 2025-05-05 | End: 2025-05-05

## 2025-05-05 RX ORDER — POTASSIUM CHLORIDE 1500 MG/1
40 TABLET, EXTENDED RELEASE ORAL ONCE
Status: COMPLETED | OUTPATIENT
Start: 2025-05-05 | End: 2025-05-05

## 2025-05-05 RX ORDER — ACETAMINOPHEN 500 MG
500-1000 TABLET ORAL EVERY 6 HOURS
Status: DISCONTINUED | OUTPATIENT
Start: 2025-05-05 | End: 2025-05-12

## 2025-05-05 RX ADMIN — ACETAMINOPHEN 500 MG: 500 TABLET ORAL at 01:06

## 2025-05-05 RX ADMIN — PIPERACILLIN AND TAZOBACTAM 3.38 G: 3; .375 INJECTION, POWDER, FOR SOLUTION INTRAVENOUS at 05:13

## 2025-05-05 RX ADMIN — HYDROMORPHONE HYDROCHLORIDE 4 MG: 4 TABLET ORAL at 22:20

## 2025-05-05 RX ADMIN — ACETAMINOPHEN 500 MG: 500 TABLET ORAL at 05:20

## 2025-05-05 RX ADMIN — KETOROLAC TROMETHAMINE 15 MG: 15 INJECTION, SOLUTION INTRAMUSCULAR; INTRAVENOUS at 13:29

## 2025-05-05 RX ADMIN — HYDROMORPHONE HYDROCHLORIDE 2 MG: 2 TABLET ORAL at 10:52

## 2025-05-05 RX ADMIN — ATORVASTATIN CALCIUM 20 MG: 10 TABLET, FILM COATED ORAL at 20:26

## 2025-05-05 RX ADMIN — HYDROMORPHONE HYDROCHLORIDE 4 MG: 4 TABLET ORAL at 06:38

## 2025-05-05 RX ADMIN — LEVOTHYROXINE SODIUM 75 MCG: 0.03 TABLET ORAL at 06:39

## 2025-05-05 RX ADMIN — KETOROLAC TROMETHAMINE 15 MG: 15 INJECTION, SOLUTION INTRAMUSCULAR; INTRAVENOUS at 01:07

## 2025-05-05 RX ADMIN — PIPERACILLIN AND TAZOBACTAM 3.38 G: 3; .375 INJECTION, POWDER, FOR SOLUTION INTRAVENOUS at 22:21

## 2025-05-05 RX ADMIN — POTASSIUM CHLORIDE 40 MEQ: 1500 TABLET, EXTENDED RELEASE ORAL at 09:14

## 2025-05-05 RX ADMIN — ACETAMINOPHEN 500 MG: 500 TABLET ORAL at 12:31

## 2025-05-05 RX ADMIN — MAGNESIUM SULFATE HEPTAHYDRATE 2 G: 40 INJECTION, SOLUTION INTRAVENOUS at 09:14

## 2025-05-05 RX ADMIN — ACETAMINOPHEN 500 MG: 500 TABLET ORAL at 18:18

## 2025-05-05 RX ADMIN — KETOROLAC TROMETHAMINE 15 MG: 15 INJECTION, SOLUTION INTRAMUSCULAR; INTRAVENOUS at 06:38

## 2025-05-05 RX ADMIN — HYDROMORPHONE HYDROCHLORIDE 4 MG: 4 TABLET ORAL at 02:13

## 2025-05-05 RX ADMIN — PIPERACILLIN AND TAZOBACTAM 3.38 G: 3; .375 INJECTION, POWDER, FOR SOLUTION INTRAVENOUS at 10:23

## 2025-05-05 RX ADMIN — FLUCONAZOLE 200 MG: 200 TABLET ORAL at 08:17

## 2025-05-05 RX ADMIN — POTASSIUM PHOSPHATE, MONOBASIC POTASSIUM PHOSPHATE, DIBASIC 15 MMOL: 224; 236 INJECTION, SOLUTION, CONCENTRATE INTRAVENOUS at 12:05

## 2025-05-05 RX ADMIN — PIPERACILLIN AND TAZOBACTAM 3.38 G: 3; .375 INJECTION, POWDER, FOR SOLUTION INTRAVENOUS at 18:05

## 2025-05-05 ASSESSMENT — ACTIVITIES OF DAILY LIVING (ADL)
ADLS_ACUITY_SCORE: 37
ADLS_ACUITY_SCORE: 39
ADLS_ACUITY_SCORE: 38
ADLS_ACUITY_SCORE: 39
ADLS_ACUITY_SCORE: 37
ADLS_ACUITY_SCORE: 39
ADLS_ACUITY_SCORE: 40
ADLS_ACUITY_SCORE: 37
ADLS_ACUITY_SCORE: 40
ADLS_ACUITY_SCORE: 37
ADLS_ACUITY_SCORE: 38
ADLS_ACUITY_SCORE: 39
ADLS_ACUITY_SCORE: 40

## 2025-05-05 NOTE — PROGRESS NOTES
Interventional Radiology - Progress Note  Inpatient - [facility]  05/05/2025     S: S/p CT guided pelvic abscess drain placement 5/4. Afebrile. WBC trending down, remain high. On Zosyn and fluconazole for antibiotics. No concerns with IR drain. Dressing is c/d/I. Denies pain, chills, n/v, or leaking.     O:  BP (!) 140/60 (BP Location: Left arm)   Pulse 78   Temp 97.8  F (36.6  C) (Oral)   Resp 18   Wt 78.3 kg (172 lb 9.6 oz)   SpO2 93%   BMI 29.17 kg/m    General: Stable. In no acute distress.    Neuro: Alert and oriented x 3. No focal deficits.  Psychiatric: Appropriate mood and affect. Cooperative..  Respiratory: Normal respirations on room air.  Breathing comfortably. Unlabored.  Cardiac: Warm and well perfused.  Abdomen: Soft.  Skin: Warm and dry.    Drains(s)/Tube(s):   - Abscess Drain: Pelvic drain to BEKA bulb with serous output. Dressing clean, dry, and intact. No leaking appreciated from drain exit site. Drain site nontender with palpation. Flushing port 3 way stop cock in place and appropriately positioned. Tubing appears intact and patent. Draining well.     IMAGING:  CT Abdomen Peritoneum Abscess Drain w Cath Place    Narrative  EXAM:  1. PERCUTANEOUS DRAIN PLACEMENT PELVIC ABSCESS  2. CT GUIDANCE  3. CONSCIOUS SEDATION  LOCATION: [facility]  DATE: 5/4/2025    INDICATION: post op pelvic abscess  TECHNIQUE: Dose reduction techniques were used.    PROCEDURE: Informed consent obtained. Site marked. Prior images reviewed. Required items made available. Patient identity confirmed verbally and with arm band. Patient reevaluated immediately before administering sedation. Universal protocol was  followed. Time out performed. The left lower quadrant access site was prepped and draped in sterile fashion. 10 mL of 1% lidocaine was infused into the local soft tissues. Using standard technique and under direct CT guidance, a 12 Slovenian drainage  catheter was  inserted into the fluid collection.    SPECIMEN: 20 mL of turbid yellow fluid was aspirated and sent to lab for cultures and Gram stain.    BLOOD LOSS: Minimal.    The patient tolerated the procedure well. No immediate complications.    SEDATION: Versed 2 mg. Fentanyl 100 mcg. The procedure was performed with administration intravenous conscious sedation with appropriate preoperative, intraoperative, and postoperative evaluation.    15 minutes of supervised face to face conscious sedation time was provided by a radiology nurse under my direct supervision.    Impression  IMPRESSION:  1.  Successful CT-guided drain placement into left pelvic fluid collection/abscess.    Reference CPT Codes: 14172, 53726    LABS:  Recent Labs   Lab 05/05/25  0614 05/04/25  0627 05/02/25  0657 05/02/25  0656 05/01/25  0713   WBC 15.2* 15.4*  --  9.0 9.1   HGB 9.0* 10.0*  --  10.0*  10.0* 10.4*    408  --  403 375   CR 0.80 0.79 0.90  --  0.89   BILITOTAL  --   --   --   --  0.4   ALKPHOS  --   --   --   --  350*   AST  --   --   --   --  23   ALT  --   --   --   --  65*     Culture:  All cultures:  Recent Labs   Lab 05/04/25  1455   CULTURE No growth, less than 1 day      30-Day Micro Results       Collected Updated Procedure Result Status      05/04/2025 1455 05/05/2025 0927 Abscess Aerobic Bacterial Culture Routine With Gram Stain [[id number]]   Abscess from Abdomen    Preliminary result Component Value   Culture No growth, less than 1 day  [P]    Gram Stain Result No organisms seen  [P]     4+ WBC seen  [P]     Predominantly PMNs               05/04/2025 1455 05/04/2025 1527 Anaerobic Bacterial Culture Routine [[id number]]   Abscess from Abdomen    In process Component Value   No component results               05/04/2025 1455 05/04/2025 1527 Fungal or Yeast Culture Routine [[id number]]   Abscess from Abdomen    In process Component Value   No component results               04/28/2025 0141 04/28/2025 1011 MRSA MSSA  PCR, Nasal Swab [[id number]]    Swab from Nares, Bilateral    Final result Component Value   MRSA Target DNA Negative   SA Target DNA Positive                   Antibiotic: Zosyn & Fluconazole  Flushing: 10mL q 8 hours    Drain Outputs (in mL):  5/4 55cc   5/5 5cc                 A: 70 year old female with recent surgery to RLE presented with abdominal pain 4/27; found to have perforated diverticulitis. CT showing new abscess 5/1 - underwent laparoscopic washout with abd drain placement (surgery). CT on 5/4 showing new left pelvic abscess - S/p IR pelvic abscess drain placement 5/4.    P:    - Continue to follow WBC and Cultures, drain OPs and patient's clinical signs/symptoms for improvement.   - Continue drain to BEKA drainage.   - Flush as ordered; always flush in toward patient's body to keep internal portion of drain patent.   - Subtract irrigant from output and record output in I&O every shift/PRN.   - Antibiotics per primary team.  - Drain care education provided to patient. Questions answered.   - Drain discharge instructions entered into D/C navigator.   - Patient to flush drain with 10mL NS daily upon discharge.   - Will continue to follow patient's clinical status, imaging, drain(s) function and drain(s) OPs to determine drain follow up plan.   -Anticipating follow-up CT/abscessogram approximately 7-14 days from drain placement date, once drain outputs are <20mL/day for a consecutive 2 days, and/or pending patient's clinical status and drain function. It is is typically common for patients to discharge with drain in place. Discussed plan for follow-up with patient who expressed understanding.   - Drain follow up can occur as inpatient or outpatient, most often the first drain check will occur as outpatient.  - IR will continue to follow loosely. Please contact IR with drain related questions or concerns.    Total time spent on the date of the encounter: 45 minutes.    Neville Spaulding, JAVIER  [facility]  Interventional Radiology  [phone]

## 2025-05-05 NOTE — PROGRESS NOTES
"[place] [facility] Progress Note    SUBJECTIVE:  Appreciate Dr Shaw care and procedure.  Drain in.  Many white cells, micro P    REVIEW OF SYSTEMS:  Negative unless as listed above.  Social history, Family history, Medications: reviewed.    OBJECTIVE:  BP (!) 140/60 (BP Location: Left arm)   Pulse 78   Temp 97.8  F (36.6  C) (Oral)   Resp 18   Wt 76.2 kg (167 lb 15.9 oz)   SpO2 93%   BMI 28.39 kg/m                PHYSICAL EXAM:  Alert, awake  Vitals tabulated above, reviewed  Neck supple without lymphadenopathy  Sclera normal color, not injected  CARDIOVASCULAR regular rate and rhythm, no murmur  Lungs CLEAR TO AUSCULTATION   Abdomen soft  2 drains one looks like bile, one like seroma  Skin normal  Joints normal  Neurologic exam non focal    Antibiotics:z/fluc    Pertinent labs:    Recent Labs   Lab Test 05/05/25  0614 05/04/25  0627 05/02/25  0656   WBC 15.2* 15.4* 9.0   HGB 9.0* 10.0* 10.0*  10.0*   HCT 29.0* 31.3* 30.7*   MCV 89 88 89    408 403       Lab Results   Component Value Date    RBC 3.47 05/02/2025     Lab Results   Component Value Date    HGB 10.0 05/02/2025    HGB 10.0 05/02/2025     Lab Results   Component Value Date    HCT 30.7 05/02/2025     No components found for: \"MCT\"  Lab Results   Component Value Date    MCV 89 05/02/2025     Lab Results   Component Value Date    MCH 28.8 05/02/2025     Lab Results   Component Value Date    MCHC 32.6 05/02/2025     Lab Results   Component Value Date    RDW 13.0 05/02/2025     Lab Results   Component Value Date     05/02/2025       Last Comprehensive Metabolic Panel:  Sodium   Date Value Ref Range Status   05/05/2025 134 (L) 135 - 145 mmol/L Final     Potassium   Date Value Ref Range Status   05/05/2025 3.2 (L) 3.4 - 5.3 mmol/L Final     Chloride   Date Value Ref Range Status   05/05/2025 100 98 - 107 mmol/L Final     Carbon Dioxide (CO2)   Date Value Ref Range Status   05/05/2025 26 22 - 29 mmol/L Final     Anion Gap   Date " "Value Ref Range Status   05/05/2025 8 7 - 15 mmol/L Final     Glucose   Date Value Ref Range Status   05/05/2025 115 (H) 70 - 99 mg/dL Final     GLUCOSE BY METER POCT   Date Value Ref Range Status   05/03/2025 127 (H) 70 - 99 mg/dL Final     Urea Nitrogen   Date Value Ref Range Status   05/05/2025 5.8 (L) 8.0 - 23.0 mg/dL Final     Creatinine   Date Value Ref Range Status   05/05/2025 0.80 0.51 - 0.95 mg/dL Final     GFR Estimate   Date Value Ref Range Status   05/05/2025 78 >60 mL/min/1.73m2 Final     Comment:     eGFR calculated using 2021 CKD-EPI equation.     Calcium   Date Value Ref Range Status   05/05/2025 7.9 (L) 8.8 - 10.4 mg/dL Final       Liver Function Studies -   Recent Labs   Lab Test 05/01/25  0713   PROTTOTAL 6.6   ALBUMIN 3.5   BILITOTAL 0.4   ALKPHOS 350*   AST 23   ALT 65*       No results found for: \"SED\"    No results found for: \"CRP\"            MICROBIOLOGY DATA:        IMAGING/RADIOLOGY:                                                                       1.  New left pelvic abscess measuring 9.9 x 4.9 cm.  2.  Decreased in size posterior pelvic abscess (2.1 x 6.9 cm, previously 4 x 7.8 cm).  3.  Slight improved sigmoid colonic wall thickening with persistent small to moderate volume pneumoperitoneum.      ASSESSMENT:  Peritonitis, post washout with operative note finding abscess(es)  New abscess now post op, manifested in the classic way with rising white count while on abx.    RECOMMENDATION:  Zosyn/fluc  Drain in, so white count should now drop would think  I did order cxs to be done after IR drains sample  Will follow  SHERRI Frausto MD  Office [phone] option 2 to desk staff  "

## 2025-05-05 NOTE — PLAN OF CARE
Physical Therapy Discharge Summary    Reason for therapy discharge:    All goals and outcomes met, no further needs identified.    Progress towards therapy goal(s). See goals on Care Plan in [facility] electronic health record for goal details.  Goals met    Therapy recommendation(s):    Continued therapy is recommended.  Rationale/Recommendations:  outpatient PT for recent hip surgery.

## 2025-05-05 NOTE — PLAN OF CARE
Goal Outcome Evaluation:      Plan of Care Reviewed With: patient    Overall Patient Progress: improvingOverall Patient Progress: improving    Outcome Evaluation: Pt anxious about pain control and wanting to woken for her PRN's. She's independent in her room to the BSC. 2 BEKA drains with one draining serosanguinous fluid and the other draining milky white fluid. POD #4. Sleeping soundly through most of the night. Will continue to monitor.    /60 (BP Location: Left arm)   Pulse 80   Temp 100.6  F (38.1  C) (Oral)   Resp 18   Wt 76.2 kg (167 lb 15.9 oz)   SpO2 94%   BMI 28.39 kg/m      Problem: Adult Inpatient Plan of Care  Goal: Optimal Comfort and Wellbeing  Outcome: Progressing  Intervention: Monitor Pain and Promote Comfort  Recent Flowsheet Documentation  Taken 5/5/2025 0107 by Alicia Weinberg RN  Pain Management Interventions:   medication (see MAR)   repositioned  Intervention: Provide Person-Centered Care  Recent Flowsheet Documentation  Taken 5/5/2025 0107 by Alicia Weinberg RN  Trust Relationship/Rapport:   care explained   choices provided     Problem: Delirium  Goal: Improved Sleep  Outcome: Progressing  Intervention: Promote Sleep  Recent Flowsheet Documentation  Taken 5/5/2025 0107 by Alicia Weinberg RN  Sleep/Rest Enhancement:   comfort measures   noise level reduced   room darkened     Problem: Intestinal Obstruction  Goal: Optimal Bowel Function  Outcome: Progressing  Intervention: Promote Bowel Function  Recent Flowsheet Documentation  Taken 5/5/2025 0107 by Alicia Weinberg RN  Body Position: position changed independently  Head of Bed (HOB) Positioning: HOB at 20 degrees     Problem: Intestinal Obstruction  Goal: Absence of Infection Signs and Symptoms  Outcome: Progressing

## 2025-05-05 NOTE — PROGRESS NOTES
[facility]    Medicine Progress Note - Hospitalist Service    Date of Admission:  4/27/2025    Assessment & Plan   70-year-old female with recent surgery to RLE presented with abdominal pain and found to have perforated diverticulitis.    #Sigmoid Colon diverticulitis with perforation;  - CT with new abscess 5/1 - Underwent laparoscopic washout with drain placement.  - WBC elevated and CT A/P on 5/4/25 showing new left pelvic abscess.  - IR placed second drain 5/4/25 for new abscess on CT.  Plan  - General surgery and ID following, appreciate management  - Surgery planning to keep drain #1 on discharge  - IR managing drain #2 - Anticipating follow-up CT/abscessogram approximately 7-14 days from drain placement date   - On IV Zosyn and fluconazole - defer to antibiotic management to ID  - On scheduled Tylenol, as needed narcotics.  - Restarted low fiber diet 5/4 after drain placement  - NPO after midnight tonight for repeat surgical procedure.  - Following abscess cultures x2    #Recent RLE tendon repair and trochanteric bursectomy on 4/22/25;  - Initial CT A/P showing Persistent complex gas and fluid collection overlying the right hip   - Previous hospitalist discussed with Ortho PA and they are not concerned for abscess  Plan  Continue activity level as instructed by orthopedic team  -Brace at all times.  - Flatfoot weightbearing to the right lower extremity, crutches to be used when ambulating  - No active hip abduction and internal rotation.  No passive hip external rotation and abduction    #Acute blood loss anemia;  Likely related to recent surgery, sepsis, hemodilution  Hemoglobin fairly stable.    Physical deconditioning due to medical illness; PT    Transaminitis on admission,   - uncertain etiology,  - AST, ALT trending down          Diet: Diet  Low Fiber Diet  NPO for Medical/Clinical Reasons Except for: Meds  Snacks/Supplements Adult: Ensure Enlive; Between Meals    DVT  "Prophylaxis: Pneumatic Compression Devices and holding chemoprophylaxis with anticipated surgery  Luz Catheter: Not present  Lines: None     Cardiac Monitoring: None  Code Status: Full Code      Clinically Significant Risk Factors        # Hypokalemia: Lowest K = 3.2 mmol/L in last 2 days, will replace as needed  # Hyponatremia: Lowest Na = 132 mmol/L in last 2 days, will monitor as appropriate  # Hypochloremia: Lowest Cl = 97 mmol/L in last 2 days, will monitor as appropriate      # Hypoalbuminemia: Lowest albumin = 3.3 g/dL at 4/28/2025  6:14 AM, will monitor as appropriate                # Overweight: Estimated body mass index is 29.17 kg/m  as calculated from the following:    Height as of 4/22/25: 1.638 m (5' 4.5\").    Weight as of this encounter: 78.3 kg (172 lb 9.6 oz).             Social Drivers of Health    Housing Stability: High Risk (4/28/2025)    Housing Stability     Do you have housing? : No     Are you worried about losing your housing?: No   Physical Activity: Insufficiently Active (3/16/2024)    Received from [facility]    Exercise Vital Sign     Days of Exercise per Week: 3 days     Minutes of Exercise per Session: 30 min          Disposition Plan     Medically Ready for Discharge: Anticipated in 2-4 Days             Wiliam Ortega MD  Hospitalist Service  [facility]  Securely message with [email or web address] (more info)  Text page via [facility] Paging/Directory   ______________________________________________________________________    Interval History   - Patient up and walking with PT today.  - Continued to have pain overnight but manageable with pain medications.    Physical Exam   Vital Signs: Temp: 97.7  F (36.5  C) Temp src: Oral BP: (!) 157/77 (notified nurse) Pulse: 86   Resp: 18 SpO2: 96 % O2 Device: None (Room air)    Weight: 172 lbs 9.6 oz    General: Alert and Oriented x3. Answers questions appropriately. Follows commands.  Resp: Breath sounds equal throughout. No " crackles. No wheezing.  Cardio: Regular rate and rhythm. No murmurs. No friction rub.  Abd: Soft. Non-distended. Diffuse abdominal tenderness.  MSK: No areas of ecchymosis or erythema.  Neuro: CN 2-12 grossly intact. Strength 5/5 in all 4 extremities.  Skin:No rashes. No jaundice.     Medical Decision Making       45 MINUTES SPENT BY ME on the date of service doing chart review, history, exam, documentation & further activities per the note.  MANAGEMENT DISCUSSED with the following over the past 24 hours: RN, MARTÍNEZ   Tests ORDERED & REVIEWED in the past 24 hours:  - BMP  - CBC  - Magnesium  - Phosphorus  Medical complexity over the past 24 hours:  - Prescription DRUG MANAGEMENT performed      Data     I have personally reviewed the following data over the past 24 hrs:    15.2 (H)  \   9.0 (L)   / 415     134 (L) 100 5.8 (L) /  115 (H)   3.2 (L) 26 0.80 \       Imaging results reviewed over the past 24 hrs:   Recent Results (from the past 24 hours)   CT Abdomen Peritoneum Abscess Drain w Cath Place    Narrative    EXAM:  1. PERCUTANEOUS DRAIN PLACEMENT PELVIC ABSCESS  2. CT GUIDANCE  3. CONSCIOUS SEDATION  LOCATION: [facility]  DATE: 5/4/2025    INDICATION: post op pelvic abscess  TECHNIQUE: Dose reduction techniques were used.    PROCEDURE: Informed consent obtained. Site marked. Prior images reviewed. Required items made available. Patient identity confirmed verbally and with arm band. Patient reevaluated immediately before administering sedation. Universal protocol was   followed. Time out performed. The left lower quadrant access site was prepped and draped in sterile fashion. 10 mL of 1% lidocaine was infused into the local soft tissues. Using standard technique and under direct CT guidance, a 12 Thai drainage   catheter was inserted into the fluid collection.      SPECIMEN: 20 mL of turbid yellow fluid was aspirated and sent to lab for cultures and Gram stain.    BLOOD LOSS:  Minimal.    The patient tolerated the procedure well. No immediate complications.    SEDATION: Versed 2 mg. Fentanyl 100 mcg. The procedure was performed with administration intravenous conscious sedation with appropriate preoperative, intraoperative, and postoperative evaluation.    15 minutes of supervised face to face conscious sedation time was provided by a radiology nurse under my direct supervision.      Impression    IMPRESSION:  1.  Successful CT-guided drain placement into left pelvic fluid collection/abscess.    Reference CPT Codes: 92178, 15343

## 2025-05-05 NOTE — PROGRESS NOTES
"CLINICAL NUTRITION SERVICES - ASSESSMENT NOTE    RECOMMENDATIONS FOR MDs/PROVIDERS TO ORDER:  Re-start home MVI/M r/t inadequate intakes fruit/vegetables     Registered Dietitian Interventions:  Re-start Ensure enlive BID     Future/Additional Recommendations:  Monitor po, weight, labs, I/Os.      REASON FOR ASSESSMENT  LOS    HPI: Pt with recent surgery to [facility] presented with abdominal pain and found to have perforated diverticulitis.     INFORMATION OBTAINED  Assessed patient in room.    NUTRITION HISTORY  Met with pt at bedside this AM. Pt with some decreased oral intakes and appetite with abdominal pain. Pt denies any recent weight changes, reports she wants to lose some weight. Pt denies any nausea or vomiting. Pt reports abd pain improving since admit. Pt interested in re-starting nutrition supplements ensure enlive BID. Pt with some familiarity with low fiber diet.     CURRENT NUTRITION ORDERS  Diet: Low Fiber    CURRENT INTAKE/TOLERANCE  4/28: None   4/29: 100% x2 meals- jello and lemon ice   4/30: None   5/1: x1 meal ordered, clear liquids   5/2: 100% dinner + x1 ensure  5/3: 100% x2 meals + x2 ensure   5/4: 100% x1 meal  Pt consuming 1581 kcal and 98 g protein 5/3, meets 100% nutrition needs     LABS  Nutrition-relevant labs: Na 134(L), K 3.2(L), BUN 5.8(L)     MEDICATIONS  Nutrition-relevant medications: Scheduled lipitor, toradol, Mg sulfate, zosyn, K Phos  Pt on phos and K replacement protocols     ANTHROPOMETRICS  Height: 5'4.5\"   Admission Weight: 76.2 kg (167 lb 15.9 oz) (04/28/25 0015)   Most Recent Weight: 76.2 kg (167 lb 15.9 oz) (04/28/25 0015)  IBW: 55.7 kg  BMI: Body mass index is 28.39 kg/m .   Weight History: No significant wt changes noted   Wt Readings from Last 10 Encounters:   04/28/25 76.2 kg (167 lb 15.9 oz)   04/22/25 75.8 kg (167 lb)   09/13/24 75.3 kg (166 lb)   1/9/24   79.4 kg (175 lb)     Dosing Weight: 76.2 kg actual wt for kcal, 55.7 kg IBW for protein    ASSESSED NUTRITION " NEEDS  Estimated Energy Needs: 1550+ kcals/day ([place] Jeor x1.2+)  Justification: Increased needs  Estimated Protein Needs: 55-67+ grams protein/day (1 - 1.2+ grams of pro/kg)  Justification: Increased needs  Estimated Fluid Needs: 1550+ mL/day (1 mL/kcal)  Justification: Maintenance    SYSTEM AND PHYSICAL FINDINGS    GI symptoms:   Rounded abd  BM: 100 ml + x2 5/4   LLQ drain, OP: 55 ml 5/4   L-Abd drain, OP: 40 ml 5/4  Skin/wounds:   Incision/surgical sites     MALNUTRITION  % Intake: Decreased intake does not meet criteria  % Weight Loss: None noted  Subcutaneous Fat Loss: None observed  Muscle Loss: Clavicles (pectoralis and deltoids): Mild  Fluid Accumulation/Edema: None noted  Malnutrition Diagnosis: Patient does not meet two of the established criteria necessary for diagnosing malnutrition  Malnutrition Present on Admission: No    NUTRITION DIAGNOSIS  Altered gastrointestinal (GI) function related to diverticulitis as evidenced by need for low fiber diet and education.     INTERVENTIONS  Medical food supplement therapy - Ensure enlive BID  Nutrition education application - Reviewed low fiber diet and eventual transition to regular diet. Provided handout: Fiber- Restricted Nutrition Therapy     GOALS  Electrolytes WNL  Patient to consume % of nutritionally adequate meal trays TID, or the equivalent with supplements/snacks.     MONITORING/EVALUATION  Progress toward goals will be monitored and evaluated per policy.

## 2025-05-05 NOTE — PLAN OF CARE
Problem: Adult Inpatient Plan of Care  Goal: Optimal Comfort and Wellbeing  Outcome: Progressing  Intervention: Monitor Pain and Promote Comfort  Recent Flowsheet Documentation  Taken 5/4/2025 1713 by Rj Conroy RN  Pain Management Interventions: medication (see MAR)  Intervention: Provide Person-Centered Care  Recent Flowsheet Documentation  Taken 5/4/2025 1938 by Rj Conroy RN  Trust Relationship/Rapport: care explained     Problem: Pain Acute  Goal: Optimal Pain Control and Function  Outcome: Progressing  Intervention: Develop Pain Management Plan  Recent Flowsheet Documentation  Taken 5/4/2025 1713 by Rj Conroy RN  Pain Management Interventions: medication (see MAR)  Intervention: Prevent or Manage Pain  Recent Flowsheet Documentation  Taken 5/4/2025 1938 by Rj Conroy RN  Medication Review/Management: medications reviewed   Goal Outcome Evaluation:    Pt requested for her prn Dilaudid q 4 hr as needed for pain. Asking staff to make sure to wake her up even if she's sleeping.  She's wants to stay on top so she will not have to suffer when the pain come.  Received schedule Toradol and schedule Tylenol in between.  Seems to be doing well with pain this evening.  Also gave prn Miralax.

## 2025-05-05 NOTE — PLAN OF CARE
Problem: Adult Inpatient Plan of Care  Goal: Plan of Care Review  Description: The Plan of Care Review/Shift note should be completed every shift.  The Outcome Evaluation is a brief statement about your assessment that the patient is improving, declining, or no change.  This information will be displayed automatically on your shiftnote.  Outcome: Not Progressing   Goal Outcome Evaluation:         Pt is discouraged about new drain placement and possible surgery tomorrow. Pain has been managed with oral dilaudid, scheduled Tylenol and Toradol.

## 2025-05-06 LAB
ANION GAP SERPL CALCULATED.3IONS-SCNC: 8 MMOL/L (ref 7–15)
BACTERIA ABSC ANAEROBE+AEROBE CULT: ABNORMAL
BUN SERPL-MCNC: 6.5 MG/DL (ref 8–23)
CALCIUM SERPL-MCNC: 8.3 MG/DL (ref 8.8–10.4)
CHLORIDE SERPL-SCNC: 100 MMOL/L (ref 98–107)
CREAT SERPL-MCNC: 0.77 MG/DL (ref 0.51–0.95)
EGFRCR SERPLBLD CKD-EPI 2021: 82 ML/MIN/1.73M2
ERYTHROCYTE [DISTWIDTH] IN BLOOD BY AUTOMATED COUNT: 13.5 % (ref 10–15)
GLUCOSE SERPL-MCNC: 111 MG/DL (ref 70–99)
HCO3 SERPL-SCNC: 25 MMOL/L (ref 22–29)
HCT VFR BLD AUTO: 26.2 % (ref 35–47)
HGB BLD-MCNC: 8.6 G/DL (ref 11.7–15.7)
MAGNESIUM SERPL-MCNC: 2 MG/DL (ref 1.7–2.3)
MCH RBC QN AUTO: 28.7 PG (ref 26.5–33)
MCHC RBC AUTO-ENTMCNC: 32.8 G/DL (ref 31.5–36.5)
MCV RBC AUTO: 87 FL (ref 78–100)
PHOSPHATE SERPL-MCNC: 2.4 MG/DL (ref 2.5–4.5)
PLATELET # BLD AUTO: 391 10E3/UL (ref 150–450)
POTASSIUM SERPL-SCNC: 3.6 MMOL/L (ref 3.4–5.3)
RBC # BLD AUTO: 3 10E6/UL (ref 3.8–5.2)
SODIUM SERPL-SCNC: 133 MMOL/L (ref 135–145)
WBC # BLD AUTO: 12.6 10E3/UL (ref 4–11)

## 2025-05-06 PROCEDURE — 250N000013 HC RX MED GY IP 250 OP 250 PS 637: Performed by: NURSE PRACTITIONER

## 2025-05-06 PROCEDURE — 36415 COLL VENOUS BLD VENIPUNCTURE: CPT | Performed by: STUDENT IN AN ORGANIZED HEALTH CARE EDUCATION/TRAINING PROGRAM

## 2025-05-06 PROCEDURE — 99232 SBSQ HOSP IP/OBS MODERATE 35: CPT | Performed by: INTERNAL MEDICINE

## 2025-05-06 PROCEDURE — 80048 BASIC METABOLIC PNL TOTAL CA: CPT | Performed by: STUDENT IN AN ORGANIZED HEALTH CARE EDUCATION/TRAINING PROGRAM

## 2025-05-06 PROCEDURE — 250N000013 HC RX MED GY IP 250 OP 250 PS 637

## 2025-05-06 PROCEDURE — 84100 ASSAY OF PHOSPHORUS: CPT | Performed by: STUDENT IN AN ORGANIZED HEALTH CARE EDUCATION/TRAINING PROGRAM

## 2025-05-06 PROCEDURE — 120N000001 HC R&B MED SURG/OB

## 2025-05-06 PROCEDURE — 250N000013 HC RX MED GY IP 250 OP 250 PS 637: Performed by: INTERNAL MEDICINE

## 2025-05-06 PROCEDURE — 99232 SBSQ HOSP IP/OBS MODERATE 35: CPT | Mod: 24

## 2025-05-06 PROCEDURE — 250N000013 HC RX MED GY IP 250 OP 250 PS 637: Performed by: EMERGENCY MEDICINE

## 2025-05-06 PROCEDURE — 83735 ASSAY OF MAGNESIUM: CPT | Performed by: STUDENT IN AN ORGANIZED HEALTH CARE EDUCATION/TRAINING PROGRAM

## 2025-05-06 PROCEDURE — 99233 SBSQ HOSP IP/OBS HIGH 50: CPT | Performed by: EMERGENCY MEDICINE

## 2025-05-06 PROCEDURE — G0545 PR INHRENT VISIT TO INPT/OBS W CNFRM/SUSPCT INFCT DIS BY INFCT DIS SPCIALST: HCPCS | Performed by: INTERNAL MEDICINE

## 2025-05-06 PROCEDURE — 250N000011 HC RX IP 250 OP 636: Mod: JZ

## 2025-05-06 PROCEDURE — 250N000011 HC RX IP 250 OP 636

## 2025-05-06 PROCEDURE — 85027 COMPLETE CBC AUTOMATED: CPT | Performed by: STUDENT IN AN ORGANIZED HEALTH CARE EDUCATION/TRAINING PROGRAM

## 2025-05-06 RX ORDER — KETOROLAC TROMETHAMINE 15 MG/ML
15 INJECTION, SOLUTION INTRAMUSCULAR; INTRAVENOUS EVERY 6 HOURS
Status: COMPLETED | OUTPATIENT
Start: 2025-05-06 | End: 2025-05-07

## 2025-05-06 RX ADMIN — LEVOTHYROXINE SODIUM 75 MCG: 0.03 TABLET ORAL at 06:31

## 2025-05-06 RX ADMIN — KETOROLAC TROMETHAMINE 15 MG: 15 INJECTION, SOLUTION INTRAMUSCULAR; INTRAVENOUS at 10:40

## 2025-05-06 RX ADMIN — SIMETHICONE 80 MG: 80 TABLET, CHEWABLE ORAL at 20:00

## 2025-05-06 RX ADMIN — KETOROLAC TROMETHAMINE 15 MG: 15 INJECTION, SOLUTION INTRAMUSCULAR; INTRAVENOUS at 22:19

## 2025-05-06 RX ADMIN — HYDROMORPHONE HYDROCHLORIDE 2 MG: 2 TABLET ORAL at 02:22

## 2025-05-06 RX ADMIN — HYDROMORPHONE HYDROCHLORIDE 2 MG: 2 TABLET ORAL at 06:30

## 2025-05-06 RX ADMIN — ATORVASTATIN CALCIUM 20 MG: 10 TABLET, FILM COATED ORAL at 20:00

## 2025-05-06 RX ADMIN — ACETAMINOPHEN 1000 MG: 500 TABLET ORAL at 11:53

## 2025-05-06 RX ADMIN — POTASSIUM & SODIUM PHOSPHATES POWDER PACK 280-160-250 MG 1 PACKET: 280-160-250 PACK at 17:11

## 2025-05-06 RX ADMIN — PIPERACILLIN AND TAZOBACTAM 3.38 G: 3; .375 INJECTION, POWDER, FOR SOLUTION INTRAVENOUS at 17:11

## 2025-05-06 RX ADMIN — ACETAMINOPHEN 500 MG: 500 TABLET ORAL at 06:31

## 2025-05-06 RX ADMIN — PIPERACILLIN AND TAZOBACTAM 3.38 G: 3; .375 INJECTION, POWDER, FOR SOLUTION INTRAVENOUS at 04:53

## 2025-05-06 RX ADMIN — ACETAMINOPHEN 500 MG: 500 TABLET ORAL at 00:04

## 2025-05-06 RX ADMIN — POTASSIUM & SODIUM PHOSPHATES POWDER PACK 280-160-250 MG 1 PACKET: 280-160-250 PACK at 08:13

## 2025-05-06 RX ADMIN — POTASSIUM & SODIUM PHOSPHATES POWDER PACK 280-160-250 MG 1 PACKET: 280-160-250 PACK at 11:53

## 2025-05-06 RX ADMIN — KETOROLAC TROMETHAMINE 15 MG: 15 INJECTION, SOLUTION INTRAMUSCULAR; INTRAVENOUS at 17:11

## 2025-05-06 RX ADMIN — PIPERACILLIN AND TAZOBACTAM 3.38 G: 3; .375 INJECTION, POWDER, FOR SOLUTION INTRAVENOUS at 10:40

## 2025-05-06 RX ADMIN — ACETAMINOPHEN 500 MG: 500 TABLET ORAL at 17:11

## 2025-05-06 RX ADMIN — FLUCONAZOLE 200 MG: 200 TABLET ORAL at 08:13

## 2025-05-06 ASSESSMENT — ACTIVITIES OF DAILY LIVING (ADL)
ADLS_ACUITY_SCORE: 39
ADLS_ACUITY_SCORE: 40
ADLS_ACUITY_SCORE: 39
ADLS_ACUITY_SCORE: 40
ADLS_ACUITY_SCORE: 39
ADLS_ACUITY_SCORE: 40
ADLS_ACUITY_SCORE: 39
ADLS_ACUITY_SCORE: 40
ADLS_ACUITY_SCORE: 39
ADLS_ACUITY_SCORE: 40

## 2025-05-06 NOTE — PLAN OF CARE
Problem: Adult Inpatient Plan of Care  Goal: Absence of Hospital-Acquired Illness or Injury  Outcome: Progressing  Intervention: Identify and Manage Fall Risk  Recent Flowsheet Documentation  Taken 5/6/2025 0815 by Kika Romero RN  Safety Promotion/Fall Prevention: activity supervised     Problem: Adult Inpatient Plan of Care  Goal: Optimal Comfort and Wellbeing  Outcome: Progressing  Intervention: Provide Person-Centered Care  Recent Flowsheet Documentation  Taken 5/6/2025 0815 by Kika Romero RN  Trust Relationship/Rapport: care explained     Problem: Intestinal Obstruction  Goal: Absence of Infection Signs and Symptoms  Outcome: Progressing     Pt pain controlled with scheduled 1000 mg tylenol and toradol.   Pt on low fiber diet, toelrating without nausea or vomiting.

## 2025-05-06 NOTE — PLAN OF CARE
Problem: Adult Inpatient Plan of Care  Goal: Plan of Care Review  Description: The Plan of Care Review/Shift note should be completed every shift.  The Outcome Evaluation is a brief statement about your assessment that the patient is improving, declining, or no change.  This information will be displayed automatically on your shiftnote.  Outcome: Progressing   Goal Outcome Evaluation:       Oral dilaudid and schedule tylenol given. PIV, SL. Up to commode uses walker. Voiding well, No BM this shift. NPO since mid-night.

## 2025-05-06 NOTE — PROGRESS NOTES
"[facility] Progress Note    SUBJECTIVE:  The newest drain has nothing in it.  The older one is bilious in color and thick, pasty character.     REVIEW OF SYSTEMS:  Negative unless as listed above.  Social history, Family history, Medications: reviewed.    OBJECTIVE:  /61 (BP Location: Left arm)   Pulse 73   Temp 98.1  F (36.7  C) (Oral)   Resp 18   Wt 78.3 kg (172 lb 9.6 oz)   SpO2 96%   BMI 29.17 kg/m                PHYSICAL EXAM:  Alert, awake  Vitals tabulated above, reviewed  Neck supple without lymphadenopathy  Sclera normal color, not injected  CARDIOVASCULAR regular rate and rhythm, no murmur  Lungs CLEAR TO AUSCULTATION   Abdomen soft  2 drains one looks like bile, one like seroma  Skin normal  Joints normal  Neurologic exam non focal    Antibiotics:z/fluc    Pertinent labs:    Recent Labs   Lab Test 05/06/25  0527 05/05/25  0614 05/04/25 0627   WBC 12.6* 15.2* 15.4*   HGB 8.6* 9.0* 10.0*   HCT 26.2* 29.0* 31.3*   MCV 87 89 88    415 408       Lab Results   Component Value Date    RBC 3.47 05/02/2025     Lab Results   Component Value Date    HGB 10.0 05/02/2025    HGB 10.0 05/02/2025     Lab Results   Component Value Date    HCT 30.7 05/02/2025     No components found for: \"MCT\"  Lab Results   Component Value Date    MCV 89 05/02/2025     Lab Results   Component Value Date    MCH 28.8 05/02/2025     Lab Results   Component Value Date    MCHC 32.6 05/02/2025     Lab Results   Component Value Date    RDW 13.0 05/02/2025     Lab Results   Component Value Date     05/02/2025       Last Comprehensive Metabolic Panel:  Sodium   Date Value Ref Range Status   05/06/2025 133 (L) 135 - 145 mmol/L Final     Potassium   Date Value Ref Range Status   05/06/2025 3.6 3.4 - 5.3 mmol/L Final     Chloride   Date Value Ref Range Status   05/06/2025 100 98 - 107 mmol/L Final     Carbon Dioxide (CO2)   Date Value Ref Range Status   05/06/2025 25 22 - 29 mmol/L Final     Anion Gap " "  Date Value Ref Range Status   05/06/2025 8 7 - 15 mmol/L Final     Glucose   Date Value Ref Range Status   05/06/2025 111 (H) 70 - 99 mg/dL Final     GLUCOSE BY METER POCT   Date Value Ref Range Status   05/03/2025 127 (H) 70 - 99 mg/dL Final     Urea Nitrogen   Date Value Ref Range Status   05/06/2025 6.5 (L) 8.0 - 23.0 mg/dL Final     Creatinine   Date Value Ref Range Status   05/06/2025 0.77 0.51 - 0.95 mg/dL Final     GFR Estimate   Date Value Ref Range Status   05/06/2025 82 >60 mL/min/1.73m2 Final     Comment:     eGFR calculated using 2021 CKD-EPI equation.     Calcium   Date Value Ref Range Status   05/06/2025 8.3 (L) 8.8 - 10.4 mg/dL Final       Liver Function Studies -   Recent Labs   Lab Test 05/01/25  0713   PROTTOTAL 6.6   ALBUMIN 3.5   BILITOTAL 0.4   ALKPHOS 350*   AST 23   ALT 65*       No results found for: \"SED\"    No results found for: \"CRP\"            MICROBIOLOGY DATA:        IMAGING/RADIOLOGY:                                                                       1.  New left pelvic abscess measuring 9.9 x 4.9 cm.  2.  Decreased in size posterior pelvic abscess (2.1 x 6.9 cm, previously 4 x 7.8 cm).  3.  Slight improved sigmoid colonic wall thickening with persistent small to moderate volume pneumoperitoneum.      ASSESSMENT:  Peritonitis, post washout with operative note finding abscess(es)  New abscess now post op, manifested in the classic way with rising white count while on abx.  Newest abscess with pseudomonas    RECOMMENDATION:  Zosyn/fluc  Drain in, so white count should now drop would think  We very well might have the option of cipro and flagyl for discharge, with a treatment duration I expect will be on the longer side of things.     SHERRI Frausto MD  Office [phone] option 2 to desk staff  "

## 2025-05-06 NOTE — PROGRESS NOTES
Daily Progress Note    Assessment/Plan:  70-year-old female with recent surgery to RLE presented with abdominal pain and found to have perforated diverticulitis.     #Sigmoid Colon diverticulitis with perforation;  - CT with new abscess 5/1 - Underwent laparoscopic washout with drain placement.  - WBC elevated and CT A/P on 5/4/25 showing new left pelvic abscess.  -White count improved today to 12.6  - IR placed second drain 5/4/25 for new abscess on CT.  Plan  - General surgery and ID following, appreciate management  - Surgery planning to keep drain #1 on discharge  - IR managing drain #2 - Anticipating follow-up CT/abscessogram approximately 7-14 days from drain placement date   - On IV Zosyn and fluconazole - defer to antibiotic management to ID  - On scheduled Tylenol, as needed narcotics.  - Restarted low fiber diet 5/4 after drain placement  - repeat surgical procedure tentatively scheduled today.  - Following abscess cultures x2     #Recent RLE tendon repair and trochanteric bursectomy on 4/22/25;  - Initial CT A/P showing Persistent complex gas and fluid collection overlying the right hip   - Previous hospitalist discussed with Ortho PA and they are not concerned for abscess  Plan  Continue activity level as instructed by orthopedic team  -Brace at all times.  - Flatfoot weightbearing to the right lower extremity, crutches to be used when ambulating  - No active hip abduction and internal rotation.  No passive hip external rotation and abduction     #Acute blood loss anemia;  Likely related to recent surgery, sepsis, hemodilution  Hemoglobin fairly stable.     Physical deconditioning due to medical illness; PT     Transaminitis on admission,   - uncertain etiology,  - AST, ALT trending down           Diet: Diet  Low Fiber Diet  NPO for Medical/Clinical Reasons Except for: Meds  Snacks/Supplements Adult: Ensure Enlive; Between Meals    DVT Prophylaxis: Pneumatic Compression Devices and holding  "chemoprophylaxis with anticipated surgery  Luz Catheter: Not present  Lines: None     Cardiac Monitoring: None  Code Status: Full Code    Clinically Significant Risk Factors        # Hypokalemia: Lowest K = 3.2 mmol/L in last 2 days, will replace as needed  # Hyponatremia: Lowest Na = 133 mmol/L in last 2 days, will monitor as appropriate       # Hypoalbuminemia: Lowest albumin = 3.3 g/dL at 4/28/2025  6:14 AM, will monitor as appropriate                # Overweight: Estimated body mass index is 29.17 kg/m  as calculated from the following:    Height as of 4/22/25: 1.638 m (5' 4.5\").    Weight as of this encounter: 78.3 kg (172 lb 9.6 oz).                Code status:Full Code        Barriers to Discharge: IV antibiotics, further procedure planned    Disposition: Anticipate multiday stay    Subjective:  Michelle is new to me today, chart reviewed discussed with staff.  She reports she feels relatively well, pain is well-controlled, no nausea or vomiting.  No current fevers or chills.        Current Medications Reviewed via EHR List    Objective:  Vital signs in last 24 hours:  [unfilled]  .prog  Weight:   @THISENCWEIGHTS(1)@  Weight change:   Body mass index is 29.17 kg/m .    Intake/Output last 3 shifts:  I/O last 3 completed shifts:  In: 959 [P.O.:600; I.V.:9; IV Piggyback:350]  Out: 110 [Drains:110]  Intake/Output this shift:  No intake/output data recorded.    Physical Exam:  General: No apparent distress  CV: Regular rate and rhythm  Lungs: No tachypnea  Abdomen:         Imaging:  Personally Reviewed.  CT Abdomen Peritoneum Abscess Drain w Cath Place    Result Date: 5/4/2025  EXAM: 1. PERCUTANEOUS DRAIN PLACEMENT PELVIC ABSCESS 2. CT GUIDANCE 3. CONSCIOUS SEDATION LOCATION: [facility] DATE: 5/4/2025 INDICATION: post op pelvic abscess TECHNIQUE: Dose reduction techniques were used. PROCEDURE: Informed consent obtained. Site marked. Prior images reviewed. Required items made available. " Patient identity confirmed verbally and with arm band. Patient reevaluated immediately before administering sedation. Universal protocol was followed. Time out performed. The left lower quadrant access site was prepped and draped in sterile fashion. 10 mL of 1% lidocaine was infused into the local soft tissues. Using standard technique and under direct CT guidance, a 12 Cuban drainage catheter was inserted into the fluid collection.  SPECIMEN: 20 mL of turbid yellow fluid was aspirated and sent to lab for cultures and Gram stain. BLOOD LOSS: Minimal. The patient tolerated the procedure well. No immediate complications. SEDATION: Versed 2 mg. Fentanyl 100 mcg. The procedure was performed with administration intravenous conscious sedation with appropriate preoperative, intraoperative, and postoperative evaluation. 15 minutes of supervised face to face conscious sedation time was provided by a radiology nurse under my direct supervision.     IMPRESSION: 1.  Successful CT-guided drain placement into left pelvic fluid collection/abscess. Reference CPT Codes: 66502, 80324    CT Abdomen Pelvis w Contrast    Result Date: 5/4/2025  EXAM: CT ABDOMEN AND PELVIS WITH CONTRAST LOCATION: [facility] DATE: 05/04/2025 INDICATION: Significant WBC increase POD3 with known abdominal infection; assess for new fluid collections. COMPARISON: CT abdomen and pelvis 04/30/2025. TECHNIQUE: CT scan of the abdomen and pelvis was performed following injection of IV contrast. Multiplanar reformats were obtained. Dose reduction techniques were used. CONTRAST: Isovue 370, 82 mL. FINDINGS: LOWER CHEST: Small hiatal hernia. HEPATOBILIARY: Normal. PANCREAS: Normal. SPLEEN: Normal. ADRENAL GLANDS: Normal. KIDNEYS/BLADDER: Normal. BOWEL: Slight improved thickening of the sigmoid colon. Liquid stool throughout the colon. No bowel obstruction. Mural fat deposition within a few loops of terminal ileum likely reflect chronic  inflammation. LYMPH NODES/PERITONEUM: New left pelvic gas and fluid collection measuring 9.9 x 4.9 cm (3/156). Decreased in size posterior pelvic fluid collection measuring 2.1 x 6.9 cm, previously 4 x 7.8 cm (3/178). Surgical drain terminates in the pelvis just anterior to the aforementioned pelvic collection. Small to moderate volume pneumoperitoneum. VASCULATURE: Normal. PELVIC ORGANS: Unremarkable. MUSCULOSKELETAL: Trace abdominal wall subcutaneous gas. Resolved gas within a right lateral thigh fluid collection measuring approximately 4.1 x 2.6 cm.     IMPRESSION: 1.  New left pelvic abscess measuring 9.9 x 4.9 cm. 2.  Decreased in size posterior pelvic abscess (2.1 x 6.9 cm, previously 4 x 7.8 cm). 3.  Slight improved sigmoid colonic wall thickening with persistent small to moderate volume pneumoperitoneum.     CT Abdomen Pelvis w Contrast    Result Date: 4/30/2025  EXAM: CT ABDOMEN PELVIS W CONTRAST LOCATION: [facility] DATE: 4/30/2025 INDICATION: reevaluate colonic perforation. COMPARISON: 4/27/2025 TECHNIQUE: CT scan of the abdomen and pelvis was performed following injection of IV contrast. Multiplanar reformats were obtained. Dose reduction techniques were used. CONTRAST: 82ml isovue 370 FINDINGS: LOWER CHEST: No basilar infiltrates. Small amount of pneumoperitoneum extends into a small hiatal hernia. HEPATOBILIARY: No biliary dilatation. PANCREAS: Unremarkable SPLEEN: Unremarkable ADRENAL GLANDS: Unremarkable KIDNEYS/BLADDER: Bilateral heterogeneous renal enhancement without perinephric stranding. Decompressed bladder. BOWEL: Moderate volume of pneumoperitoneum redemonstrated. Long segment wall thickening in the rectosigmoid, increased. Numerous associated diverticula. Scattered colonic air-fluid levels. No small bowel obstruction. New multilobulated fluid collections in the posterior pelvis and left pelvic sidewall. Dominant pelvic collection measures approximately 7.8 x 4.0 cm,  image 172 series 3. Smaller collections along the left pelvic sidewall measure between 2.9 and 1.8 cm, image 178. 3.5 cm collection along the right pelvic sidewall also noted. LYMPH NODES: No significant retroperitoneal adenopathy. VASCULATURE: No abdominal aortic aneurysm. Patent portal vein. PELVIC ORGANS: Small uterine fibroid. MUSCULOSKELETAL: No acute bony abnormalities. Multilevel spondylosis and moderate facet arthropathies. Small fat-containing umbilical hernia. Gas and fluid collection superficial to the proximal left femur measures up to 5.0 cm, image 204. Adjacent fluid  and gas tracks along the upper right thigh musculature. Overall appearances are similar to prior exam given differences in technique.      IMPRESSION: 1.  New gas and fluid collections in the pelvis compatible with abscess formation. 2.  Persistent pneumoperitoneum. 3.  Increasing rectosigmoid wall thickening. 4.  Persistent complex gas and fluid collection overlying the right hip.    CT External Imaging Abdomen    Result Date: 4/27/2025  Images were obtained from an external facility.  Click PACS Images hyperlink to view images.  Textual results have been scanned into the media tab.    CT ABDOMEN PELVIS WO    Result Date: 4/27/2025  For Patients: As a result of the 21st Century Cures Act, medical imaging exams and procedure reports are released immediately into your electronic medical record. You may view this report before your referring provider. If you have questions, please contact your health care provider. EXAM: CT ABDOMEN PELVIS WO LOCATION: [facility] [place] DATE: 4/27/2025 INDICATION: Lower abdominal pain. COMPARISON: None. TECHNIQUE: CT scan of the abdomen and pelvis was performed without IV contrast. Multiplanar reformats were obtained. Dose reduction techniques were used. CONTRAST: None. FINDINGS: LOWER CHEST: Small esophageal hiatal hernia. 3.1 mm noncalcified pulmonary nodule in the right lower lobe. This has  been stable since prior CT 11/17/2023 and CT 3/30/2017, thus given interval stability this should be benign with no follow-up recommended. HEPATOBILIARY: Normal. PANCREAS: Normal. SPLEEN: Normal. ADRENAL GLANDS: Normal. KIDNEYS/BLADDER: Normal. BOWEL: There is a mild amount of free intraperitoneal gas seen with the origin of the gas likely from the distal sigmoid colon which shows some adjacent stranding of the fat, gas, and minimal free appearing fluid. These findings are likely secondary to sequelae of diverticulitis. No drainable abscess identified. Mild diverticulosis most marked in the sigmoid colon. Small esophageal hiatal hernia. The bowel is otherwise normal to include the appendix. LYMPH NODES: Normal. VASCULATURE: Mild calcification with no aneurysm. PELVIC ORGANS: Normal. MUSCULOSKELETAL: In the subcutaneous fat lateral to the greater trochanteric region of the right hip there is a partially seen gas/fluid collection with the visualized portion measuring approximately 1.6 x 2.1 x 2.8 cm with stranding in the adjacent fat. Underlying changes of inflammation/abscess formation suggested. No destructive changes to suggest osteomyelitis. Presumed prior postoperative changes seen in both hips. Advanced multilevel degenerative changes in the spine.    1.  Findings worrisome for diverticulitis involving the sigmoid colon with no drainable abscess. There is some associated mild intraperitoneal free gas. 2.  Changes worrisome for an abscess in the subcutaneous fat lateral to the lower right greater trochanteric region. 3.  3.1 mm noncalcified pulmonary nodule in the right lower lobe which has been stable dating back to 3/30/2017, thus should be benign with no follow-up recommended. 4.  The intraperitoneal free gas finding was called to provider Ruben at approximately 9:00 PM central standard time 4/27/2025.    MA Screening Bilateral w/ Babak    Result Date: 4/11/2025  EXAM: MAMMOGRAM SCREENING BABAK BILATERAL  "LOCATION: [facility] DATE: 4/10/2025 INDICATION: Asymptomatic. Screening Mammogram. COMPARISON: 4/4/24, 4/3/23 BREAST DENSITY: The breasts are heterogeneously dense, which may obscure small masses. FINDINGS: Tomosynthesis craniocaudal and mediolateral oblique views were obtained. No concerning mammographic findings.    IMPRESSION: ACR BI-RADS 1: Negative Recommended follow-up: Annual Mammography beginning at age 40 or as discussed with your provider. When performed, computer-aided detection was used in the interpretation of this study. A lay language report of this examination will be mailed to the patient. LIFETIME BREAST CANCER RISK ASSESSMENT SCORE: Lifetime risk of developing breast cancer is 6.3% calculated using the Carla Model and information provided by the patient at the time of screening. The average lifetime risk for developing breast cancer is 12.9% for women born in the . For patients with a lifetime breast cancer risk assessment score of less than 20%, annual screening mammography is recommended. For patients with a lifetime risk of greater than 20%, annual screening mammography supplemented with annual Breast MRI is recommended. Patients in this category are encouraged to discuss this recommendation with their healthcare provider to determine if Breast MRI is appropriate and if so, to obtain a referral and confirm coverage with their health insurance. Recommendations are based on the American College of Radiology Appropriateness Criteria. Patients with a BI-RADS category of 0 should follow the recommendations for further evaluation before considering supplemental screening.      Lab Results:  Personally Reviewed.   Fingerstick Blood Glucose: @ACSMXYC86OXT(POCGLUFGR:10)@    Last Hbg A1C: No results found for: \"HGBA1C\"   Lab Results   Component Value Date    INR 1.16 (H) 04/28/2025    INR 1.04 04/27/2025     Recent Results (from the past 24 hours)   Basic metabolic " panel    Collection Time: 05/06/25  5:27 AM   Result Value Ref Range    Sodium 133 (L) 135 - 145 mmol/L    Potassium 3.6 3.4 - 5.3 mmol/L    Chloride 100 98 - 107 mmol/L    Carbon Dioxide (CO2) 25 22 - 29 mmol/L    Anion Gap 8 7 - 15 mmol/L    Urea Nitrogen 6.5 (L) 8.0 - 23.0 mg/dL    Creatinine 0.77 0.51 - 0.95 mg/dL    GFR Estimate 82 >60 mL/min/1.73m2    Calcium 8.3 (L) 8.8 - 10.4 mg/dL    Glucose 111 (H) 70 - 99 mg/dL   CBC with platelets    Collection Time: 05/06/25  5:27 AM   Result Value Ref Range    WBC Count 12.6 (H) 4.0 - 11.0 10e3/uL    RBC Count 3.00 (L) 3.80 - 5.20 10e6/uL    Hemoglobin 8.6 (L) 11.7 - 15.7 g/dL    Hematocrit 26.2 (L) 35.0 - 47.0 %    MCV 87 78 - 100 fL    MCH 28.7 26.5 - 33.0 pg    MCHC 32.8 31.5 - 36.5 g/dL    RDW 13.5 10.0 - 15.0 %    Platelet Count 391 150 - 450 10e3/uL   Magnesium    Collection Time: 05/06/25  5:27 AM   Result Value Ref Range    Magnesium 2.0 1.7 - 2.3 mg/dL   Phosphorus    Collection Time: 05/06/25  5:27 AM   Result Value Ref Range    Phosphorus 2.4 (L) 2.5 - 4.5 mg/dL       Medically Ready for Discharge: Anticipated in 2-4 Days       Advanced Care Planning    Medical Decision Making       50 MINUTES SPENT BY ME on the date of service doing chart review, history, exam, documentation & further activities per the note.      Carlos Valdez MD  Date: 5/6/2025  Time: 7:49 AM  [facility]

## 2025-05-06 NOTE — PLAN OF CARE
Problem: Pain Acute  Goal: Optimal Pain Control and Function  Outcome: Progressing  Intervention: Develop Pain Management Plan  Recent Flowsheet Documentation  Taken 5/5/2025 2220 by Gege Barbour, RN  Pain Management Interventions: medication (see MAR)  Taken 5/5/2025 2028 by Gege Barbour, RN  Pain Management Interventions: emotional support  Intervention: Prevent or Manage Pain  Recent Flowsheet Documentation  Taken 5/5/2025 1901 by Gege Barbour, RN  Medication Review/Management: medications reviewed   Goal Outcome Evaluation:    Problem: Intestinal Obstruction  Goal: Absence of Infection Signs and Symptoms  Outcome: Progressing        Pt receiving antibiotics x 2 this shift. Pt up to the bathroom x 3 with small loose BM and small urine voids. Checked PVR x 1 and noted 125 ml after voiding. Pt requested Dilaudid x 1 at HS stating pain was suddenly up to a 7. Pain had previously been managed by scheduled tylenol.

## 2025-05-06 NOTE — PROGRESS NOTES
General Surgery Progress Note:    Hospital Day # 9    ASSESSMENT:     1. Gluteal tendinitis of right buttock    2. Diverticulitis of large intestine with perforation without abscess, unspecified bleeding status        Michelle Isaac is a 70 year old female s/p laparoscopic lavage and drain placement 5/1 for sigmoid diverticulitis with moderate pneumoperitoneum and pelvic abscesses.  POD 2 with increased leukocytosis, CT demonstrating left pelvic fluid collection s/p IR drain placement.  POD 4 new brown output in surgical drain.    Downtrending leukocytosis today and remains afebrile yesterday and today, nontachycardic and pain levels are well-controlled using less narcotic for the last 24 hours than previously.  Will plan to continue monitoring patient conservatively    PLAN:   -Okay for low fiber diet today  -Continue monitoring output and character of drains in place, IR drain management per IR  -ABX per ID, planning for PO meds  -No surgical intervention planned currently  -CBC in am  - Scheduled Tylenol, Toradol, Dilaudid as needed; p.o. meds only    SUBJECTIVE:   Michelle Isaac was seen on rounds.  States she has used less narcotic medication in the last 24 hours.  Slept well overnight.  Denies any severe abdominal pain.  Is voiding appropriately and passing flatus, pain increases with movement and today is centralized along the left lower quadrant and near the IR drain site.  Is having bowel movements.  Is reassured that she is not having surgery today, discussion regarding potential courses if continuing to be stable and doing well.    VITALS RANGE:  Temp:  [97.5  F (36.4  C)-98.6  F (37  C)] 98.1  F (36.7  C)  Pulse:  [73-86] 73  Resp:  [18] 18  BP: (118-157)/(51-77) 130/61  SpO2:  [96 %-98 %] 96 %    Physical Exam:  General: patient seen resting in bed in no acute distress  Resp: no increased work of breathing, breathing comfortably on room air  Abdomen: Generally soft with apprehensive tenderness in  the left lower quadrant, generally nonperitoneal with second palpation.  Left lower quadrant is generally where her tenderness is currently along the tract of where I think the IR drain is.  Otherwise generally softly distended mildly.  IR drain output is generally serous and clear in nature  Surgical drain output remains darker brown liquid in nature  Output by Drain (mL) 05/04/25 0700 - 05/04/25 1459 05/04/25 1500 - 05/04/25 2259 05/04/25 2300 - 05/05/25 0659 05/05/25 0700 - 05/05/25 1459 05/05/25 1500 - 05/05/25 2259 05/05/25 2300 - 05/06/25 0659 05/06/25 0700 - 05/06/25 1102   Closed/Suction Drain 1 LLQ Bulb 12 Albanian  40 20  35 5    Drain Closed/Suction 1 Left Abdomen Bulb 19 Albanian 20  40 20 40 10        Extremities: No edema, cyanosis, or obvious deformities visualized on exam    No results displayed because visit has over 200 results.           TAMMY Spears [facility] General Surgery  [facility]  [address]  [place] [address]  [facility] [phone]

## 2025-05-07 ENCOUNTER — ANESTHESIA EVENT (OUTPATIENT)
Dept: SURGERY | Facility: HOSPITAL | Age: 70
End: 2025-05-07
Payer: MEDICARE

## 2025-05-07 LAB
ANION GAP SERPL CALCULATED.3IONS-SCNC: 7 MMOL/L (ref 7–15)
BUN SERPL-MCNC: 5.8 MG/DL (ref 8–23)
CALCIUM SERPL-MCNC: 8.6 MG/DL (ref 8.8–10.4)
CHLORIDE SERPL-SCNC: 100 MMOL/L (ref 98–107)
CREAT SERPL-MCNC: 0.75 MG/DL (ref 0.51–0.95)
EGFRCR SERPLBLD CKD-EPI 2021: 84 ML/MIN/1.73M2
ERYTHROCYTE [DISTWIDTH] IN BLOOD BY AUTOMATED COUNT: 13.5 % (ref 10–15)
GLUCOSE SERPL-MCNC: 115 MG/DL (ref 70–99)
HCO3 SERPL-SCNC: 29 MMOL/L (ref 22–29)
HCT VFR BLD AUTO: 28.5 % (ref 35–47)
HGB BLD-MCNC: 9.3 G/DL (ref 11.7–15.7)
MCH RBC QN AUTO: 28.5 PG (ref 26.5–33)
MCHC RBC AUTO-ENTMCNC: 32.6 G/DL (ref 31.5–36.5)
MCV RBC AUTO: 87 FL (ref 78–100)
PHOSPHATE SERPL-MCNC: 3.1 MG/DL (ref 2.5–4.5)
PLATELET # BLD AUTO: 498 10E3/UL (ref 150–450)
POTASSIUM SERPL-SCNC: 3.7 MMOL/L (ref 3.4–5.3)
RBC # BLD AUTO: 3.26 10E6/UL (ref 3.8–5.2)
SODIUM SERPL-SCNC: 136 MMOL/L (ref 135–145)
WBC # BLD AUTO: 12.6 10E3/UL (ref 4–11)

## 2025-05-07 PROCEDURE — G0545 PR INHRENT VISIT TO INPT/OBS W CNFRM/SUSPCT INFCT DIS BY INFCT DIS SPCIALST: HCPCS | Performed by: INTERNAL MEDICINE

## 2025-05-07 PROCEDURE — 84100 ASSAY OF PHOSPHORUS: CPT | Performed by: EMERGENCY MEDICINE

## 2025-05-07 PROCEDURE — 99232 SBSQ HOSP IP/OBS MODERATE 35: CPT | Performed by: INTERNAL MEDICINE

## 2025-05-07 PROCEDURE — 250N000011 HC RX IP 250 OP 636

## 2025-05-07 PROCEDURE — 250N000013 HC RX MED GY IP 250 OP 250 PS 637

## 2025-05-07 PROCEDURE — 250N000011 HC RX IP 250 OP 636: Mod: JZ | Performed by: EMERGENCY MEDICINE

## 2025-05-07 PROCEDURE — 250N000013 HC RX MED GY IP 250 OP 250 PS 637: Performed by: INTERNAL MEDICINE

## 2025-05-07 PROCEDURE — 85027 COMPLETE CBC AUTOMATED: CPT | Performed by: EMERGENCY MEDICINE

## 2025-05-07 PROCEDURE — 36415 COLL VENOUS BLD VENIPUNCTURE: CPT | Performed by: EMERGENCY MEDICINE

## 2025-05-07 PROCEDURE — G0463 HOSPITAL OUTPT CLINIC VISIT: HCPCS

## 2025-05-07 PROCEDURE — 99233 SBSQ HOSP IP/OBS HIGH 50: CPT | Performed by: EMERGENCY MEDICINE

## 2025-05-07 PROCEDURE — 250N000011 HC RX IP 250 OP 636: Performed by: INTERNAL MEDICINE

## 2025-05-07 PROCEDURE — 120N000001 HC R&B MED SURG/OB

## 2025-05-07 PROCEDURE — 250N000011 HC RX IP 250 OP 636: Mod: JZ

## 2025-05-07 PROCEDURE — 99232 SBSQ HOSP IP/OBS MODERATE 35: CPT | Mod: 57

## 2025-05-07 PROCEDURE — 80048 BASIC METABOLIC PNL TOTAL CA: CPT | Performed by: EMERGENCY MEDICINE

## 2025-05-07 RX ORDER — KETOROLAC TROMETHAMINE 15 MG/ML
15 INJECTION, SOLUTION INTRAMUSCULAR; INTRAVENOUS EVERY 6 HOURS PRN
Status: DISCONTINUED | OUTPATIENT
Start: 2025-05-07 | End: 2025-05-09

## 2025-05-07 RX ADMIN — KETOROLAC TROMETHAMINE 15 MG: 15 INJECTION, SOLUTION INTRAMUSCULAR; INTRAVENOUS at 03:59

## 2025-05-07 RX ADMIN — PIPERACILLIN AND TAZOBACTAM 3.38 G: 3; .375 INJECTION, POWDER, FOR SOLUTION INTRAVENOUS at 05:36

## 2025-05-07 RX ADMIN — ACETAMINOPHEN 500 MG: 500 TABLET ORAL at 23:48

## 2025-05-07 RX ADMIN — FLUCONAZOLE 200 MG: 200 TABLET ORAL at 08:34

## 2025-05-07 RX ADMIN — PANTOPRAZOLE SODIUM 40 MG: 40 INJECTION, POWDER, FOR SOLUTION INTRAVENOUS at 19:21

## 2025-05-07 RX ADMIN — SIMETHICONE 80 MG: 80 TABLET, CHEWABLE ORAL at 18:03

## 2025-05-07 RX ADMIN — CALCIUM CARBONATE (ANTACID) CHEW TAB 500 MG 1000 MG: 500 CHEW TAB at 11:30

## 2025-05-07 RX ADMIN — PIPERACILLIN AND TAZOBACTAM 3.38 G: 3; .375 INJECTION, POWDER, FOR SOLUTION INTRAVENOUS at 10:47

## 2025-05-07 RX ADMIN — ACETAMINOPHEN 500 MG: 500 TABLET ORAL at 00:18

## 2025-05-07 RX ADMIN — SIMETHICONE 80 MG: 80 TABLET, CHEWABLE ORAL at 11:30

## 2025-05-07 RX ADMIN — ACETAMINOPHEN 1000 MG: 500 TABLET ORAL at 17:40

## 2025-05-07 RX ADMIN — LEVOTHYROXINE SODIUM 75 MCG: 0.03 TABLET ORAL at 06:24

## 2025-05-07 RX ADMIN — ACETAMINOPHEN 1000 MG: 500 TABLET ORAL at 06:24

## 2025-05-07 RX ADMIN — PIPERACILLIN AND TAZOBACTAM 3.38 G: 3; .375 INJECTION, POWDER, FOR SOLUTION INTRAVENOUS at 18:03

## 2025-05-07 RX ADMIN — KETOROLAC TROMETHAMINE 15 MG: 15 INJECTION, SOLUTION INTRAMUSCULAR; INTRAVENOUS at 17:41

## 2025-05-07 RX ADMIN — ACETAMINOPHEN 1000 MG: 500 TABLET ORAL at 11:30

## 2025-05-07 RX ADMIN — ATORVASTATIN CALCIUM 20 MG: 10 TABLET, FILM COATED ORAL at 22:17

## 2025-05-07 RX ADMIN — PIPERACILLIN AND TAZOBACTAM 3.38 G: 3; .375 INJECTION, POWDER, FOR SOLUTION INTRAVENOUS at 00:02

## 2025-05-07 RX ADMIN — PIPERACILLIN AND TAZOBACTAM 3.38 G: 3; .375 INJECTION, POWDER, FOR SOLUTION INTRAVENOUS at 22:17

## 2025-05-07 RX ADMIN — ACETAMINOPHEN 500 MG: 500 TABLET ORAL at 00:02

## 2025-05-07 RX ADMIN — KETOROLAC TROMETHAMINE 15 MG: 15 INJECTION, SOLUTION INTRAMUSCULAR; INTRAVENOUS at 10:47

## 2025-05-07 ASSESSMENT — ACTIVITIES OF DAILY LIVING (ADL)

## 2025-05-07 NOTE — PROGRESS NOTES
General Surgery Progress Note:    Hospital Day # 10    ASSESSMENT:  1. Gluteal tendinitis of right buttock    2. Diverticulitis of large intestine with perforation without abscess, unspecified bleeding status        Michelle Isaac is a 70 year old female s/p laparoscopic lavage and drain placement 5/1 for sigmoid diverticulitis with moderate pneumoperitoneum and pelvic abscesses.  POD 2 with increased leukocytosis, CT demonstrating left pelvic fluid collection s/p IR drain placement.  POD 4 new brown output in surgical drain.    Leukocytosis is stable but not downtrending as one would expect at this point indicating smoldering inflammation and infection. Discussion with patient regarding options and likelihood of needing OR tomorrow to which she is agreeable.    **Patient canNOT abduct her right leg due to recent hip repair**    PLAN:  -Low fiber diet with protein supplements today; NPO at midnight  -Activity as tolerated deferring to ortho recommendations  -Continue ABX per ID  -Tentatively OR is held for tomorrow for surgical intervention  -CBC in am early  -Scheduled tylenol and toradol, PO meds only and narcotics only PRN  -Continue drains in place  -May have WOC see to donaldo out potential best placements for ostomy site     SUBJECTIVE:   Michelle Isaac was seen on rounds. She is disappointed about her WBC staying elevated. No severe abdominal pains overnight and toradol and tylenol are helping. Soreness continues with using bathroom but better. Tolerating diet, feels movement after. Passing flatus.     Long discussion regarding patient course and options moving forward. Current recommendation is looking at surgical intervention as we are at a smoldering state without great stride in improvement. Patient is amenable.    VITALS RANGE:  Temp:  [98.1  F (36.7  C)-98.7  F (37.1  C)] 98.6  F (37  C)  Pulse:  [73-75] 73  Resp:  [16-18] 16  BP: (119-128)/(58-73) 128/73  SpO2:  [95 %-99 %] 96 %    PHYSICAL  EXAM:  General: patient seen resting in bed in no acute distress  Resp: no increased work of breathing, breathing comfortably on room air  Abdomen: generally soft tenderness LLQ  Drains: serous IR drain, stool brown surgical drain   Output by Drain (mL) 05/05/25 0700 - 05/05/25 1459 05/05/25 1500 - 05/05/25 2259 05/05/25 2300 - 05/06/25 0659 05/06/25 0700 - 05/06/25 1459 05/06/25 1500 - 05/06/25 2259 05/06/25 2300 - 05/07/25 0659 05/07/25 0700 - 05/07/25 0936   Closed/Suction Drain 1 LLQ Bulb 12 Uzbek  35 5  1 5    Drain Closed/Suction 1 Left Abdomen Bulb 19 Uzbek 20 40 10  10 10       Extremities: No edema or cyanosis visualized on exam, no obvious deformities    05/06 0700 - 05/07 0659  In: -   Out: 26 [Drains:26]    No results displayed because visit has over 200 results.           Oanh Martinez PA-C  [facility] General Surgery  [address] [place] [address] [id number] [place] [address] [phone]

## 2025-05-07 NOTE — PLAN OF CARE
Problem: Adult Inpatient Plan of Care  Goal: Plan of Care Review  Description: The Plan of Care Review/Shift note should be completed every shift.  The Outcome Evaluation is a brief statement about your assessment that the patient is improving, declining, or no change.  This information will be displayed automatically on your shiftnote.  Outcome: Progressing   Goal Outcome Evaluation:       Tylenol and Toradol given for pain. Uses bedside commode.  ARACELIS WELLER. [place].

## 2025-05-07 NOTE — PROGRESS NOTES
"[facility] Progress Note    SUBJECTIVE: Drains about the same.  Maybe exploratory tomorrow, might be the better part of valor here.      REVIEW OF SYSTEMS:  Negative unless as listed above.  Social history, Family history, Medications: reviewed.    OBJECTIVE:  /73 (BP Location: Left arm)   Pulse 73   Temp 98.6  F (37  C) (Oral)   Resp 16   Wt 78.3 kg (172 lb 9.6 oz)   SpO2 96%   BMI 29.17 kg/m                PHYSICAL EXAM:  Alert, awake  Vitals tabulated above, reviewed  Neck supple without lymphadenopathy  Sclera normal color, not injected  CARDIOVASCULAR regular rate and rhythm, no murmur  Lungs CLEAR TO AUSCULTATION   Abdomen soft  2 drains one looks like bile, one like seroma  Skin normal  Joints normal  Neurologic exam non focal    Antibiotics:z/fluc    Pertinent labs:    Recent Labs   Lab Test 05/07/25  0541 05/06/25  0527 05/05/25  0614   WBC 12.6* 12.6* 15.2*   HGB 9.3* 8.6* 9.0*   HCT 28.5* 26.2* 29.0*   MCV 87 87 89   * 391 415       Lab Results   Component Value Date    RBC 3.47 05/02/2025     Lab Results   Component Value Date    HGB 10.0 05/02/2025    HGB 10.0 05/02/2025     Lab Results   Component Value Date    HCT 30.7 05/02/2025     No components found for: \"MCT\"  Lab Results   Component Value Date    MCV 89 05/02/2025     Lab Results   Component Value Date    MCH 28.8 05/02/2025     Lab Results   Component Value Date    MCHC 32.6 05/02/2025     Lab Results   Component Value Date    RDW 13.0 05/02/2025     Lab Results   Component Value Date     05/02/2025       Last Comprehensive Metabolic Panel:  Sodium   Date Value Ref Range Status   05/07/2025 136 135 - 145 mmol/L Final     Potassium   Date Value Ref Range Status   05/07/2025 3.7 3.4 - 5.3 mmol/L Final     Chloride   Date Value Ref Range Status   05/07/2025 100 98 - 107 mmol/L Final     Carbon Dioxide (CO2)   Date Value Ref Range Status   05/07/2025 29 22 - 29 mmol/L Final     Anion Gap   Date Value Ref " "Range Status   05/07/2025 7 7 - 15 mmol/L Final     Glucose   Date Value Ref Range Status   05/07/2025 115 (H) 70 - 99 mg/dL Final     GLUCOSE BY METER POCT   Date Value Ref Range Status   05/03/2025 127 (H) 70 - 99 mg/dL Final     Urea Nitrogen   Date Value Ref Range Status   05/07/2025 5.8 (L) 8.0 - 23.0 mg/dL Final     Creatinine   Date Value Ref Range Status   05/07/2025 0.75 0.51 - 0.95 mg/dL Final     GFR Estimate   Date Value Ref Range Status   05/07/2025 84 >60 mL/min/1.73m2 Final     Comment:     eGFR calculated using 2021 CKD-EPI equation.     Calcium   Date Value Ref Range Status   05/07/2025 8.6 (L) 8.8 - 10.4 mg/dL Final       Liver Function Studies -   Recent Labs   Lab Test 05/01/25  0713   PROTTOTAL 6.6   ALBUMIN 3.5   BILITOTAL 0.4   ALKPHOS 350*   AST 23   ALT 65*       No results found for: \"SED\"    No results found for: \"CRP\"            MICROBIOLOGY DATA:  PA and candida lusitanae      IMAGING/RADIOLOGY:                                                                       1.  New left pelvic abscess measuring 9.9 x 4.9 cm.  2.  Decreased in size posterior pelvic abscess (2.1 x 6.9 cm, previously 4 x 7.8 cm).  3.  Slight improved sigmoid colonic wall thickening with persistent small to moderate volume pneumoperitoneum.      ASSESSMENT:  Peritonitis, post washout with operative note finding abscess(es)  New abscess now post op, manifested in the classic way with rising white count while on abx.  Newest abscess with pseudomonas    RECOMMENDATION:  Zosyn/fluc--should cover the micro we know we have  Wash out (open)?  Will follow    SHERRI Frausto MD  Office [phone] option 2 to desk staff  "

## 2025-05-07 NOTE — PROGRESS NOTES
"SPIRITUAL HEALTH SERVICES NOTE  [facility]; P2    Reason for Visit: LOS    SPIRITUAL ASSESSMENT  Hopeful about surgery tomorrow; believes she is in good hands  Feeling cared for - has many friends praying for her    Patient/Family Understanding of Illness and Goals of Care - Saw Michelle due to LOS. She shares that she is scheduled to have surgery tomorrow. When asked how she feels about this, Michelle states that she trusts Dr. Velez's judgement and his decision. Michelle shares that all this started after hip surgery. At first she thought the constipation she was experiencing was due to something she had done wrong. After healing from multiple people though, she says \"It sounds like this would likely have happened no matter what.\" She notes that she still has another 4 weeks of recovery for her hip and feels that if she needs another surgery, this is the time to do it. She does acknowledge some hesitancy about the possibility of an ostomy, saying \"I would really like to avoid that if possible, but if that is what is best, I will do it.\"     Distress and Loss - Michelle acknowledges some sadness when thinking about her mother, who  some years ago.     Strengths, Coping, and Resources - Michelle reports good support around her - her , her daughter, and many friends around the country. She enjoys being outside in nature and appreciates beauty and spending time with friends. She has some projects planned that she can work on while she is recovering.     Meaning, Beliefs, and Spirituality - Michelle believes in the power of prayer. She shared a few examples of times in her life when God provided just the right person when she needed them most. She feels that she is supposed to be here at [facility] right now. She acknowledges some difficulty making sense of this illness, stating \"I have never been sick before. I really don't know why this happened.\" She says she does not need to notify any Gnosticist at " this time as she has many friends around the country praying for her. Prayer shared.     Plan of Care: No further plans for follow-up at this time, but will remain available for further support as patient/family needs/desires.    Lissa Kasper M.Div.  [facility]  Office: [phone] (for non-urgent requests)  Please Vocera or page through [facility] for time-sensitive requests

## 2025-05-07 NOTE — CONSULTS
[facility]  PRE-OP OSTOMY CONSULTATION AND ASSESSMENT    INTAKE    Type of Stoma Planned: Permanent v. Temporary Colostomy    Diagnosis Pertinent to Stoma: Perforation  Date of Diagnosis: May 2025  Type of Surgery: Resection with possible ostomy Surgery Date: 5/8/25  Surgeon: Belle     Hospital: [facility]    Current Living Situation: House  Anticipated Discharge Location Post Hospital Stay: House    EDUCATION ASSESSMENT  Present for Teaching Session: Patient   Present: NA    Significant Vision Issues: No  Hand Dexterity Issues: No    Pertinent Information/Identified Barriers to Independent Care: possible ostomy only - patient very hopeful one won't be needed - no teaching today as patient not interested at this time    Stoma Site Marking    Assessed Patient while: Lying, Sitting, and Standing  Marked in RLQ and LLQ with: Permanent Marker and Covered with Tegaderm    EDUCATION    Teaching Folder Given: NA  Instruction: WOC Role and Pouching Products: Showed pouching system    Total Time Spent with Patient: 20 minutes

## 2025-05-07 NOTE — PLAN OF CARE
Problem: Adult Inpatient Plan of Care  Goal: Optimal Comfort and Wellbeing  Outcome: Progressing  Intervention: Provide Person-Centered Care  Recent Flowsheet Documentation  Taken 5/6/2025 1800 by Palmira Mac RN  Trust Relationship/Rapport: care explained     Problem: Pain Acute  Goal: Optimal Pain Control and Function  Outcome: Progressing  Intervention: Prevent or Manage Pain  Recent Flowsheet Documentation  Taken 5/6/2025 1800 by Palmira Mac RN  Medication Review/Management: medications reviewed   Goal Outcome Evaluation:       Pt is A/ O and in bed resting comfortably, taking scheduled tylenol, no pain reported.

## 2025-05-07 NOTE — PROGRESS NOTES
Daily Progress Note    Assessment/Plan:  70-year-old female with recent surgery to RLE presented with abdominal pain and found to have perforated diverticulitis.  Given persistent white count and slow improvement, will possibly go to the OR tomorrow.     #Sigmoid Colon diverticulitis with perforation;  - CT with new abscess 5/1 - Underwent laparoscopic washout with drain placement.  - WBC elevated and CT A/P on 5/4/25 showing new left pelvic abscess.  -White count is stable at 12.6  - IR placed second drain 5/4/25 for new abscess on CT.  Plan  - General surgery and ID following, appreciate management  - Surgery planning to keep drain #1 on discharge  - IR managing drain #2 - Anticipating follow-up CT/abscessogram approximately 7-14 days from drain placement date   - On IV Zosyn and fluconazole - defer to antibiotic management to ID  - On scheduled Tylenol, as needed narcotics.  - Restarted low fiber diet 5/4 after drain placement  - Following abscess cultures x2     #Recent RLE tendon repair and trochanteric bursectomy on 4/22/25;  - Initial CT A/P showing Persistent complex gas and fluid collection overlying the right hip   - Previous hospitalist discussed with Ortho PA and they are not concerned for abscess  Plan  Continue activity level as instructed by orthopedic team  -Brace at all times.  - Flatfoot weightbearing to the right lower extremity, crutches to be used when ambulating  - No active hip abduction and internal rotation.  No passive hip external rotation and abduction     #Acute blood loss anemia;  Likely related to recent surgery, sepsis, hemodilution  Hemoglobin fairly stable.     Physical deconditioning due to medical illness; PT     Transaminitis on admission,   - uncertain etiology,  - AST, ALT trending down           Diet: Diet  Low Fiber Diet  NPO for Medical/Clinical Reasons Except for: Meds  Snacks/Supplements Adult: Ensure Enlive; Between Meals    DVT Prophylaxis: Pneumatic Compression Devices  "and holding chemoprophylaxis with anticipated surgery  Luz Catheter: Not present  Lines: None     Cardiac Monitoring: None  Code Status: Full Code    Clinically Significant Risk Factors         # Hyponatremia: Lowest Na = 133 mmol/L in last 2 days, will monitor as appropriate       # Hypoalbuminemia: Lowest albumin = 3.3 g/dL at 4/28/2025  6:14 AM, will monitor as appropriate                # Overweight: Estimated body mass index is 29.17 kg/m  as calculated from the following:    Height as of 4/22/25: 1.638 m (5' 4.5\").    Weight as of this encounter: 78.3 kg (172 lb 9.6 oz).                Code status:Full Code        Barriers to Discharge: Perforated diverticulitis    Disposition: Anticipate multiday stay    Subjective:  Michelle states she feels worse this afternoon after drinking the Ensure.  She thinks that she has an intolerance to this.  She developed more bloating and actually started to burp up some of the Ensure.  No fevers or chills.  She is aware that she will be n.p.o. after midnight and will possibly go to the OR tomorrow.        Current Medications Reviewed via EHR List    Objective:  Vital signs in last 24 hours:  [unfilled]  .prog  Weight:   @THISENCWEIGHTS(1)@  Weight change:   Body mass index is 29.17 kg/m .    Intake/Output last 3 shifts:  I/O last 3 completed shifts:  In: -   Out: 26 [Drains:26]  Intake/Output this shift:  No intake/output data recorded.    Physical Exam:  General: Uncomfortable.  CV: Regular rate and rhythm  Lungs: Clear to auscultation  Abdomen: Distended with active bowel sounds        Imaging:  Personally Reviewed.  CT Abdomen Peritoneum Abscess Drain w Cath Place  Result Date: 5/4/2025  EXAM: 1. PERCUTANEOUS DRAIN PLACEMENT PELVIC ABSCESS 2. CT GUIDANCE 3. CONSCIOUS SEDATION LOCATION: [facility] DATE: 5/4/2025 INDICATION: post op pelvic abscess TECHNIQUE: Dose reduction techniques were used. PROCEDURE: Informed consent obtained. Site marked. " Prior images reviewed. Required items made available. Patient identity confirmed verbally and with arm band. Patient reevaluated immediately before administering sedation. Universal protocol was followed. Time out performed. The left lower quadrant access site was prepped and draped in sterile fashion. 10 mL of 1% lidocaine was infused into the local soft tissues. Using standard technique and under direct CT guidance, a 12 Vietnamese drainage catheter was inserted into the fluid collection.  SPECIMEN: 20 mL of turbid yellow fluid was aspirated and sent to lab for cultures and Gram stain. BLOOD LOSS: Minimal. The patient tolerated the procedure well. No immediate complications. SEDATION: Versed 2 mg. Fentanyl 100 mcg. The procedure was performed with administration intravenous conscious sedation with appropriate preoperative, intraoperative, and postoperative evaluation. 15 minutes of supervised face to face conscious sedation time was provided by a radiology nurse under my direct supervision.     IMPRESSION: 1.  Successful CT-guided drain placement into left pelvic fluid collection/abscess. Reference CPT Codes: 12222, 22547    CT Abdomen Pelvis w Contrast  Result Date: 5/4/2025  EXAM: CT ABDOMEN AND PELVIS WITH CONTRAST LOCATION: [facility] DATE: 05/04/2025 INDICATION: Significant WBC increase POD3 with known abdominal infection; assess for new fluid collections. COMPARISON: CT abdomen and pelvis 04/30/2025. TECHNIQUE: CT scan of the abdomen and pelvis was performed following injection of IV contrast. Multiplanar reformats were obtained. Dose reduction techniques were used. CONTRAST: Isovue 370, 82 mL. FINDINGS: LOWER CHEST: Small hiatal hernia. HEPATOBILIARY: Normal. PANCREAS: Normal. SPLEEN: Normal. ADRENAL GLANDS: Normal. KIDNEYS/BLADDER: Normal. BOWEL: Slight improved thickening of the sigmoid colon. Liquid stool throughout the colon. No bowel obstruction. Mural fat deposition within a few  loops of terminal ileum likely reflect chronic inflammation. LYMPH NODES/PERITONEUM: New left pelvic gas and fluid collection measuring 9.9 x 4.9 cm (3/156). Decreased in size posterior pelvic fluid collection measuring 2.1 x 6.9 cm, previously 4 x 7.8 cm (3/178). Surgical drain terminates in the pelvis just anterior to the aforementioned pelvic collection. Small to moderate volume pneumoperitoneum. VASCULATURE: Normal. PELVIC ORGANS: Unremarkable. MUSCULOSKELETAL: Trace abdominal wall subcutaneous gas. Resolved gas within a right lateral thigh fluid collection measuring approximately 4.1 x 2.6 cm.     IMPRESSION: 1.  New left pelvic abscess measuring 9.9 x 4.9 cm. 2.  Decreased in size posterior pelvic abscess (2.1 x 6.9 cm, previously 4 x 7.8 cm). 3.  Slight improved sigmoid colonic wall thickening with persistent small to moderate volume pneumoperitoneum.     CT Abdomen Pelvis w Contrast  Result Date: 4/30/2025  EXAM: CT ABDOMEN PELVIS W CONTRAST LOCATION: [facility] DATE: 4/30/2025 INDICATION: reevaluate colonic perforation. COMPARISON: 4/27/2025 TECHNIQUE: CT scan of the abdomen and pelvis was performed following injection of IV contrast. Multiplanar reformats were obtained. Dose reduction techniques were used. CONTRAST: 82ml isovue 370 FINDINGS: LOWER CHEST: No basilar infiltrates. Small amount of pneumoperitoneum extends into a small hiatal hernia. HEPATOBILIARY: No biliary dilatation. PANCREAS: Unremarkable SPLEEN: Unremarkable ADRENAL GLANDS: Unremarkable KIDNEYS/BLADDER: Bilateral heterogeneous renal enhancement without perinephric stranding. Decompressed bladder. BOWEL: Moderate volume of pneumoperitoneum redemonstrated. Long segment wall thickening in the rectosigmoid, increased. Numerous associated diverticula. Scattered colonic air-fluid levels. No small bowel obstruction. New multilobulated fluid collections in the posterior pelvis and left pelvic sidewall. Dominant pelvic  collection measures approximately 7.8 x 4.0 cm, image 172 series 3. Smaller collections along the left pelvic sidewall measure between 2.9 and 1.8 cm, image 178. 3.5 cm collection along the right pelvic sidewall also noted. LYMPH NODES: No significant retroperitoneal adenopathy. VASCULATURE: No abdominal aortic aneurysm. Patent portal vein. PELVIC ORGANS: Small uterine fibroid. MUSCULOSKELETAL: No acute bony abnormalities. Multilevel spondylosis and moderate facet arthropathies. Small fat-containing umbilical hernia. Gas and fluid collection superficial to the proximal left femur measures up to 5.0 cm, image 204. Adjacent fluid  and gas tracks along the upper right thigh musculature. Overall appearances are similar to prior exam given differences in technique.      IMPRESSION: 1.  New gas and fluid collections in the pelvis compatible with abscess formation. 2.  Persistent pneumoperitoneum. 3.  Increasing rectosigmoid wall thickening. 4.  Persistent complex gas and fluid collection overlying the right hip.    CT External Imaging Abdomen  Result Date: 4/27/2025  Images were obtained from an external facility.  Click PACS Images hyperlink to view images.  Textual results have been scanned into the media tab.    CT ABDOMEN PELVIS WO  Result Date: 4/27/2025  For Patients: As a result of the 21st Century Cures Act, medical imaging exams and procedure reports are released immediately into your electronic medical record. You may view this report before your referring provider. If you have questions, please contact your health care provider. EXAM: CT ABDOMEN PELVIS WO LOCATION: [facility] DATE: 4/27/2025 INDICATION: Lower abdominal pain. COMPARISON: None. TECHNIQUE: CT scan of the abdomen and pelvis was performed without IV contrast. Multiplanar reformats were obtained. Dose reduction techniques were used. CONTRAST: None. FINDINGS: LOWER CHEST: Small esophageal hiatal hernia. 3.1 mm noncalcified  pulmonary nodule in the right lower lobe. This has been stable since prior CT 11/17/2023 and CT 3/30/2017, thus given interval stability this should be benign with no follow-up recommended. HEPATOBILIARY: Normal. PANCREAS: Normal. SPLEEN: Normal. ADRENAL GLANDS: Normal. KIDNEYS/BLADDER: Normal. BOWEL: There is a mild amount of free intraperitoneal gas seen with the origin of the gas likely from the distal sigmoid colon which shows some adjacent stranding of the fat, gas, and minimal free appearing fluid. These findings are likely secondary to sequelae of diverticulitis. No drainable abscess identified. Mild diverticulosis most marked in the sigmoid colon. Small esophageal hiatal hernia. The bowel is otherwise normal to include the appendix. LYMPH NODES: Normal. VASCULATURE: Mild calcification with no aneurysm. PELVIC ORGANS: Normal. MUSCULOSKELETAL: In the subcutaneous fat lateral to the greater trochanteric region of the right hip there is a partially seen gas/fluid collection with the visualized portion measuring approximately 1.6 x 2.1 x 2.8 cm with stranding in the adjacent fat. Underlying changes of inflammation/abscess formation suggested. No destructive changes to suggest osteomyelitis. Presumed prior postoperative changes seen in both hips. Advanced multilevel degenerative changes in the spine.    1.  Findings worrisome for diverticulitis involving the sigmoid colon with no drainable abscess. There is some associated mild intraperitoneal free gas. 2.  Changes worrisome for an abscess in the subcutaneous fat lateral to the lower right greater trochanteric region. 3.  3.1 mm noncalcified pulmonary nodule in the right lower lobe which has been stable dating back to 3/30/2017, thus should be benign with no follow-up recommended. 4.  The intraperitoneal free gas finding was called to provider Ruben at approximately 9:00 PM central standard time 4/27/2025.    MA Screening Bilateral w/ Shalom  Result Date:  "4/11/2025  EXAM: MAMMOGRAM SCREENING BABAK BILATERAL LOCATION: [facility] DATE: 4/10/2025 INDICATION: Asymptomatic. Screening Mammogram. COMPARISON: 4/4/24, 4/3/23 BREAST DENSITY: The breasts are heterogeneously dense, which may obscure small masses. FINDINGS: Tomosynthesis craniocaudal and mediolateral oblique views were obtained. No concerning mammographic findings.    IMPRESSION: ACR BI-RADS 1: Negative Recommended follow-up: Annual Mammography beginning at age 40 or as discussed with your provider. When performed, computer-aided detection was used in the interpretation of this study. A lay language report of this examination will be mailed to the patient. LIFETIME BREAST CANCER RISK ASSESSMENT SCORE: Lifetime risk of developing breast cancer is 6.3% calculated using the Carla Model and information provided by the patient at the time of screening. The average lifetime risk for developing breast cancer is 12.9% for women born in the [place]. For patients with a lifetime breast cancer risk assessment score of less than 20%, annual screening mammography is recommended. For patients with a lifetime risk of greater than 20%, annual screening mammography supplemented with annual Breast MRI is recommended. Patients in this category are encouraged to discuss this recommendation with their healthcare provider to determine if Breast MRI is appropriate and if so, to obtain a referral and confirm coverage with their health insurance. Recommendations are based on the American College of Radiology Appropriateness Criteria. Patients with a BI-RADS category of 0 should follow the recommendations for further evaluation before considering supplemental screening.      Lab Results:  Personally Reviewed.   Fingerstick Blood Glucose: @[id number](POCGLUFGR:10)@    Last Hbg A1C: No results found for: \"HGBA1C\"   Lab Results   Component Value Date    INR 1.16 (H) 04/28/2025    INR 1.04 04/27/2025 "     Recent Results (from the past 24 hours)   Phosphorus    Collection Time: 05/07/25  5:41 AM   Result Value Ref Range    Phosphorus 3.1 2.5 - 4.5 mg/dL   CBC with platelets    Collection Time: 05/07/25  5:41 AM   Result Value Ref Range    WBC Count 12.6 (H) 4.0 - 11.0 10e3/uL    RBC Count 3.26 (L) 3.80 - 5.20 10e6/uL    Hemoglobin 9.3 (L) 11.7 - 15.7 g/dL    Hematocrit 28.5 (L) 35.0 - 47.0 %    MCV 87 78 - 100 fL    MCH 28.5 26.5 - 33.0 pg    MCHC 32.6 31.5 - 36.5 g/dL    RDW 13.5 10.0 - 15.0 %    Platelet Count 498 (H) 150 - 450 10e3/uL   Basic metabolic panel    Collection Time: 05/07/25  5:41 AM   Result Value Ref Range    Sodium 136 135 - 145 mmol/L    Potassium 3.7 3.4 - 5.3 mmol/L    Chloride 100 98 - 107 mmol/L    Carbon Dioxide (CO2) 29 22 - 29 mmol/L    Anion Gap 7 7 - 15 mmol/L    Urea Nitrogen 5.8 (L) 8.0 - 23.0 mg/dL    Creatinine 0.75 0.51 - 0.95 mg/dL    GFR Estimate 84 >60 mL/min/1.73m2    Calcium 8.6 (L) 8.8 - 10.4 mg/dL    Glucose 115 (H) 70 - 99 mg/dL       Medically Ready for Discharge: Anticipated in 2-4 Days       Advanced Care Planning    Medical Decision Making       50 MINUTES SPENT BY ME on the date of service doing chart review, history, exam, documentation & further activities per the note.      Carlos Valdez MD  Date: 5/7/2025  Time: 11:57 AM  [facility]

## 2025-05-07 NOTE — PROGRESS NOTES
Paged Misbah Karimi for P2 [address] Pt has been complaining of stomach gas, gas medication given without help, says has liquid stools, also moth very dry throat, pt want something. Thanks

## 2025-05-08 ENCOUNTER — ANESTHESIA (OUTPATIENT)
Dept: SURGERY | Facility: HOSPITAL | Age: 70
End: 2025-05-08
Payer: MEDICARE

## 2025-05-08 VITALS
DIASTOLIC BLOOD PRESSURE: 61 MMHG | RESPIRATION RATE: 14 BRPM | BODY MASS INDEX: 29.17 KG/M2 | TEMPERATURE: 97.9 F | SYSTOLIC BLOOD PRESSURE: 116 MMHG | WEIGHT: 172.6 LBS | OXYGEN SATURATION: 95 % | HEART RATE: 67 BPM

## 2025-05-08 LAB
BACTERIA ABSC ANAEROBE+AEROBE CULT: ABNORMAL
BACTERIA ABSC ANAEROBE+AEROBE CULT: ABNORMAL
BACTERIA ABSC ANAEROBE+AEROBE CULT: NORMAL
ERYTHROCYTE [DISTWIDTH] IN BLOOD BY AUTOMATED COUNT: 13.5 % (ref 10–15)
GLUCOSE BLDC GLUCOMTR-MCNC: 161 MG/DL (ref 70–99)
GRAM STAIN RESULT: ABNORMAL
GRAM STAIN RESULT: ABNORMAL
HCT VFR BLD AUTO: 28.7 % (ref 35–47)
HGB BLD-MCNC: 9.3 G/DL (ref 11.7–15.7)
HOLD SPECIMEN: NORMAL
MCH RBC QN AUTO: 28.3 PG (ref 26.5–33)
MCHC RBC AUTO-ENTMCNC: 32.4 G/DL (ref 31.5–36.5)
MCV RBC AUTO: 87 FL (ref 78–100)
PLATELET # BLD AUTO: 530 10E3/UL (ref 150–450)
RBC # BLD AUTO: 3.29 10E6/UL (ref 3.8–5.2)
WBC # BLD AUTO: 10.5 10E3/UL (ref 4–11)

## 2025-05-08 PROCEDURE — 258N000003 HC RX IP 258 OP 636: Performed by: PAIN MEDICINE

## 2025-05-08 PROCEDURE — 250N000011 HC RX IP 250 OP 636: Mod: JZ | Performed by: NURSE ANESTHETIST, CERTIFIED REGISTERED

## 2025-05-08 PROCEDURE — 250N000011 HC RX IP 250 OP 636

## 2025-05-08 PROCEDURE — 250N000013 HC RX MED GY IP 250 OP 250 PS 637

## 2025-05-08 PROCEDURE — 250N000011 HC RX IP 250 OP 636: Mod: JZ

## 2025-05-08 PROCEDURE — 36415 COLL VENOUS BLD VENIPUNCTURE: CPT

## 2025-05-08 PROCEDURE — 44145 PARTIAL REMOVAL OF COLON: CPT | Mod: 80 | Performed by: SPECIALIST

## 2025-05-08 PROCEDURE — 272N000001 HC OR GENERAL SUPPLY STERILE: Performed by: SURGERY

## 2025-05-08 PROCEDURE — 250N000011 HC RX IP 250 OP 636: Performed by: ANESTHESIOLOGY

## 2025-05-08 PROCEDURE — 99232 SBSQ HOSP IP/OBS MODERATE 35: CPT | Performed by: HOSPITALIST

## 2025-05-08 PROCEDURE — 0DTN0ZZ RESECTION OF SIGMOID COLON, OPEN APPROACH: ICD-10-PCS | Performed by: SURGERY

## 2025-05-08 PROCEDURE — 999N000141 HC STATISTIC PRE-PROCEDURE NURSING ASSESSMENT: Performed by: SURGERY

## 2025-05-08 PROCEDURE — 250N000009 HC RX 250: Performed by: NURSE ANESTHETIST, CERTIFIED REGISTERED

## 2025-05-08 PROCEDURE — 360N000077 HC SURGERY LEVEL 4, PER MIN: Performed by: SURGERY

## 2025-05-08 PROCEDURE — 120N000001 HC R&B MED SURG/OB

## 2025-05-08 PROCEDURE — 250N000009 HC RX 250: Performed by: SURGERY

## 2025-05-08 PROCEDURE — 0D9W0ZZ DRAINAGE OF PERITONEUM, OPEN APPROACH: ICD-10-PCS | Performed by: SURGERY

## 2025-05-08 PROCEDURE — 250N000025 HC SEVOFLURANE, PER MIN: Performed by: SURGERY

## 2025-05-08 PROCEDURE — 258N000003 HC RX IP 258 OP 636

## 2025-05-08 PROCEDURE — 44145 PARTIAL REMOVAL OF COLON: CPT | Mod: 78 | Performed by: SURGERY

## 2025-05-08 PROCEDURE — 710N000009 HC RECOVERY PHASE 1, LEVEL 1, PER MIN: Performed by: SURGERY

## 2025-05-08 PROCEDURE — 258N000003 HC RX IP 258 OP 636: Performed by: NURSE ANESTHETIST, CERTIFIED REGISTERED

## 2025-05-08 PROCEDURE — 88307 TISSUE EXAM BY PATHOLOGIST: CPT | Mod: TC | Performed by: SURGERY

## 2025-05-08 PROCEDURE — 250N000011 HC RX IP 250 OP 636: Performed by: SURGERY

## 2025-05-08 PROCEDURE — 370N000017 HC ANESTHESIA TECHNICAL FEE, PER MIN: Performed by: SURGERY

## 2025-05-08 PROCEDURE — 250N000009 HC RX 250: Performed by: ANESTHESIOLOGY

## 2025-05-08 PROCEDURE — 85014 HEMATOCRIT: CPT

## 2025-05-08 RX ORDER — LABETALOL HYDROCHLORIDE 5 MG/ML
10 INJECTION, SOLUTION INTRAVENOUS
Status: DISCONTINUED | OUTPATIENT
Start: 2025-05-08 | End: 2025-05-08 | Stop reason: HOSPADM

## 2025-05-08 RX ORDER — METRONIDAZOLE 500 MG/100ML
INJECTION, SOLUTION INTRAVENOUS PRN
Status: DISCONTINUED | OUTPATIENT
Start: 2025-05-08 | End: 2025-05-08

## 2025-05-08 RX ORDER — METRONIDAZOLE 500 MG/100ML
500 INJECTION, SOLUTION INTRAVENOUS
Status: COMPLETED | OUTPATIENT
Start: 2025-05-08 | End: 2025-05-08

## 2025-05-08 RX ORDER — NALOXONE HYDROCHLORIDE 0.4 MG/ML
0.4 INJECTION, SOLUTION INTRAMUSCULAR; INTRAVENOUS; SUBCUTANEOUS
Status: DISCONTINUED | OUTPATIENT
Start: 2025-05-08 | End: 2025-05-09

## 2025-05-08 RX ORDER — PROPOFOL 10 MG/ML
INJECTION, EMULSION INTRAVENOUS PRN
Status: DISCONTINUED | OUTPATIENT
Start: 2025-05-08 | End: 2025-05-08

## 2025-05-08 RX ORDER — SODIUM CHLORIDE, SODIUM LACTATE, POTASSIUM CHLORIDE, CALCIUM CHLORIDE 600; 310; 30; 20 MG/100ML; MG/100ML; MG/100ML; MG/100ML
INJECTION, SOLUTION INTRAVENOUS CONTINUOUS
Status: DISCONTINUED | OUTPATIENT
Start: 2025-05-08 | End: 2025-05-09

## 2025-05-08 RX ORDER — LABETALOL HYDROCHLORIDE 5 MG/ML
INJECTION, SOLUTION INTRAVENOUS PRN
Status: DISCONTINUED | OUTPATIENT
Start: 2025-05-08 | End: 2025-05-08

## 2025-05-08 RX ORDER — SODIUM CHLORIDE, SODIUM LACTATE, POTASSIUM CHLORIDE, CALCIUM CHLORIDE 600; 310; 30; 20 MG/100ML; MG/100ML; MG/100ML; MG/100ML
INJECTION, SOLUTION INTRAVENOUS CONTINUOUS
Status: DISCONTINUED | OUTPATIENT
Start: 2025-05-08 | End: 2025-05-08 | Stop reason: HOSPADM

## 2025-05-08 RX ORDER — CEFAZOLIN SODIUM/WATER 2 G/20 ML
2 SYRINGE (ML) INTRAVENOUS SEE ADMIN INSTRUCTIONS
Status: DISCONTINUED | OUTPATIENT
Start: 2025-05-08 | End: 2025-05-09

## 2025-05-08 RX ORDER — DEXAMETHASONE SODIUM PHOSPHATE 10 MG/ML
INJECTION, SOLUTION INTRAMUSCULAR; INTRAVENOUS PRN
Status: DISCONTINUED | OUTPATIENT
Start: 2025-05-08 | End: 2025-05-08

## 2025-05-08 RX ORDER — LIDOCAINE 40 MG/G
CREAM TOPICAL
Status: DISCONTINUED | OUTPATIENT
Start: 2025-05-08 | End: 2025-05-12 | Stop reason: HOSPADM

## 2025-05-08 RX ORDER — HYDROMORPHONE HCL IN WATER/PF 6 MG/30 ML
0.2 PATIENT CONTROLLED ANALGESIA SYRINGE INTRAVENOUS EVERY 5 MIN PRN
Status: DISCONTINUED | OUTPATIENT
Start: 2025-05-08 | End: 2025-05-08 | Stop reason: HOSPADM

## 2025-05-08 RX ORDER — METRONIDAZOLE 500 MG/100ML
500 INJECTION, SOLUTION INTRAVENOUS
Status: DISCONTINUED | OUTPATIENT
Start: 2025-05-08 | End: 2025-05-08

## 2025-05-08 RX ORDER — FENTANYL CITRATE 50 UG/ML
50 INJECTION, SOLUTION INTRAMUSCULAR; INTRAVENOUS EVERY 5 MIN PRN
Status: DISCONTINUED | OUTPATIENT
Start: 2025-05-08 | End: 2025-05-08 | Stop reason: HOSPADM

## 2025-05-08 RX ORDER — MAGNESIUM HYDROXIDE 1200 MG/15ML
LIQUID ORAL PRN
Status: DISCONTINUED | OUTPATIENT
Start: 2025-05-08 | End: 2025-05-08 | Stop reason: HOSPADM

## 2025-05-08 RX ORDER — ONDANSETRON 2 MG/ML
4 INJECTION INTRAMUSCULAR; INTRAVENOUS EVERY 30 MIN PRN
Status: DISCONTINUED | OUTPATIENT
Start: 2025-05-08 | End: 2025-05-08 | Stop reason: HOSPADM

## 2025-05-08 RX ORDER — ONDANSETRON 4 MG/1
4 TABLET, ORALLY DISINTEGRATING ORAL EVERY 30 MIN PRN
Status: DISCONTINUED | OUTPATIENT
Start: 2025-05-08 | End: 2025-05-08 | Stop reason: HOSPADM

## 2025-05-08 RX ORDER — CEFAZOLIN SODIUM/WATER 2 G/20 ML
2 SYRINGE (ML) INTRAVENOUS
Status: DISCONTINUED | OUTPATIENT
Start: 2025-05-08 | End: 2025-05-09

## 2025-05-08 RX ORDER — NALOXONE HYDROCHLORIDE 0.4 MG/ML
0.1 INJECTION, SOLUTION INTRAMUSCULAR; INTRAVENOUS; SUBCUTANEOUS
Status: DISCONTINUED | OUTPATIENT
Start: 2025-05-08 | End: 2025-05-08 | Stop reason: HOSPADM

## 2025-05-08 RX ORDER — KETOROLAC TROMETHAMINE 30 MG/ML
INJECTION, SOLUTION INTRAMUSCULAR; INTRAVENOUS PRN
Status: DISCONTINUED | OUTPATIENT
Start: 2025-05-08 | End: 2025-05-08

## 2025-05-08 RX ORDER — FENTANYL CITRATE 50 UG/ML
INJECTION, SOLUTION INTRAMUSCULAR; INTRAVENOUS PRN
Status: DISCONTINUED | OUTPATIENT
Start: 2025-05-08 | End: 2025-05-08

## 2025-05-08 RX ORDER — HYDROMORPHONE HCL IN WATER/PF 6 MG/30 ML
0.4 PATIENT CONTROLLED ANALGESIA SYRINGE INTRAVENOUS EVERY 5 MIN PRN
Status: DISCONTINUED | OUTPATIENT
Start: 2025-05-08 | End: 2025-05-08 | Stop reason: HOSPADM

## 2025-05-08 RX ORDER — ONDANSETRON 2 MG/ML
4 INJECTION INTRAMUSCULAR; INTRAVENOUS ONCE
Status: COMPLETED | OUTPATIENT
Start: 2025-05-08 | End: 2025-05-08

## 2025-05-08 RX ORDER — NALOXONE HYDROCHLORIDE 0.4 MG/ML
0.2 INJECTION, SOLUTION INTRAMUSCULAR; INTRAVENOUS; SUBCUTANEOUS
Status: DISCONTINUED | OUTPATIENT
Start: 2025-05-08 | End: 2025-05-09

## 2025-05-08 RX ORDER — DEXAMETHASONE SODIUM PHOSPHATE 4 MG/ML
4 INJECTION, SOLUTION INTRA-ARTICULAR; INTRALESIONAL; INTRAMUSCULAR; INTRAVENOUS; SOFT TISSUE
Status: DISCONTINUED | OUTPATIENT
Start: 2025-05-08 | End: 2025-05-08 | Stop reason: HOSPADM

## 2025-05-08 RX ORDER — BUPIVACAINE HYDROCHLORIDE 5 MG/ML
INJECTION, SOLUTION EPIDURAL; INTRACAUDAL; PERINEURAL
Status: DISCONTINUED | OUTPATIENT
Start: 2025-05-08 | End: 2025-05-08

## 2025-05-08 RX ORDER — HEPARIN SODIUM 5000 [USP'U]/.5ML
5000 INJECTION, SOLUTION INTRAVENOUS; SUBCUTANEOUS
Status: COMPLETED | OUTPATIENT
Start: 2025-05-08 | End: 2025-05-08

## 2025-05-08 RX ORDER — FENTANYL CITRATE 50 UG/ML
25 INJECTION, SOLUTION INTRAMUSCULAR; INTRAVENOUS EVERY 5 MIN PRN
Status: DISCONTINUED | OUTPATIENT
Start: 2025-05-08 | End: 2025-05-08 | Stop reason: HOSPADM

## 2025-05-08 RX ORDER — LIDOCAINE HYDROCHLORIDE 20 MG/ML
INJECTION, SOLUTION INFILTRATION; PERINEURAL PRN
Status: DISCONTINUED | OUTPATIENT
Start: 2025-05-08 | End: 2025-05-08

## 2025-05-08 RX ADMIN — PROPOFOL 50 MG: 10 INJECTION, EMULSION INTRAVENOUS at 08:03

## 2025-05-08 RX ADMIN — DEXMEDETOMIDINE HYDROCHLORIDE 20 MCG: 100 INJECTION, SOLUTION INTRAVENOUS at 08:34

## 2025-05-08 RX ADMIN — FLUCONAZOLE 200 MG: 200 TABLET ORAL at 13:11

## 2025-05-08 RX ADMIN — METRONIDAZOLE 500 MG: 500 INJECTION, SOLUTION INTRAVENOUS at 07:51

## 2025-05-08 RX ADMIN — SODIUM CHLORIDE, SODIUM LACTATE, POTASSIUM CHLORIDE, AND CALCIUM CHLORIDE: .6; .31; .03; .02 INJECTION, SOLUTION INTRAVENOUS at 20:46

## 2025-05-08 RX ADMIN — PIPERACILLIN AND TAZOBACTAM 3.38 G: 3; .375 INJECTION, POWDER, FOR SOLUTION INTRAVENOUS at 13:17

## 2025-05-08 RX ADMIN — LIDOCAINE HYDROCHLORIDE 5 ML: 20 INJECTION, SOLUTION INFILTRATION; PERINEURAL at 07:59

## 2025-05-08 RX ADMIN — BUPIVACAINE HYDROCHLORIDE 30 ML: 5 INJECTION, SOLUTION EPIDURAL; INTRACAUDAL; PERINEURAL at 12:23

## 2025-05-08 RX ADMIN — ACETAMINOPHEN 1000 MG: 500 TABLET ORAL at 12:14

## 2025-05-08 RX ADMIN — HYDROMORPHONE HYDROCHLORIDE 2 MG: 2 TABLET ORAL at 13:32

## 2025-05-08 RX ADMIN — ACETAMINOPHEN 1000 MG: 500 TABLET ORAL at 05:57

## 2025-05-08 RX ADMIN — ROCURONIUM 20 MG: 50 INJECTION, SOLUTION INTRAVENOUS at 09:29

## 2025-05-08 RX ADMIN — KETOROLAC TROMETHAMINE 30 MG: 30 INJECTION, SOLUTION INTRAMUSCULAR at 10:23

## 2025-05-08 RX ADMIN — HEPARIN SODIUM 5000 UNITS: 5000 INJECTION, SOLUTION INTRAVENOUS; SUBCUTANEOUS at 07:37

## 2025-05-08 RX ADMIN — DEXAMETHASONE SODIUM PHOSPHATE 10 MG: 10 INJECTION, SOLUTION INTRAMUSCULAR; INTRAVENOUS at 08:14

## 2025-05-08 RX ADMIN — HYDROMORPHONE HYDROCHLORIDE 0.5 MG: 1 INJECTION, SOLUTION INTRAMUSCULAR; INTRAVENOUS; SUBCUTANEOUS at 08:37

## 2025-05-08 RX ADMIN — SODIUM CHLORIDE, SODIUM LACTATE, POTASSIUM CHLORIDE, AND CALCIUM CHLORIDE: .6; .31; .03; .02 INJECTION, SOLUTION INTRAVENOUS at 10:21

## 2025-05-08 RX ADMIN — HYDROMORPHONE HYDROCHLORIDE 0.5 MG: 1 INJECTION, SOLUTION INTRAMUSCULAR; INTRAVENOUS; SUBCUTANEOUS at 08:30

## 2025-05-08 RX ADMIN — HYDROMORPHONE HYDROCHLORIDE 2 MG: 2 TABLET ORAL at 21:46

## 2025-05-08 RX ADMIN — SIMETHICONE 80 MG: 80 TABLET, CHEWABLE ORAL at 20:15

## 2025-05-08 RX ADMIN — HYDROMORPHONE HYDROCHLORIDE 4 MG: 4 TABLET ORAL at 17:39

## 2025-05-08 RX ADMIN — ROCURONIUM 50 MG: 50 INJECTION, SOLUTION INTRAVENOUS at 08:00

## 2025-05-08 RX ADMIN — ATORVASTATIN CALCIUM 20 MG: 10 TABLET, FILM COATED ORAL at 21:10

## 2025-05-08 RX ADMIN — PIPERACILLIN AND TAZOBACTAM 3.38 G: 3; .375 INJECTION, POWDER, FOR SOLUTION INTRAVENOUS at 18:03

## 2025-05-08 RX ADMIN — SODIUM CHLORIDE, SODIUM LACTATE, POTASSIUM CHLORIDE, AND CALCIUM CHLORIDE: .6; .31; .03; .02 INJECTION, SOLUTION INTRAVENOUS at 07:44

## 2025-05-08 RX ADMIN — PIPERACILLIN AND TAZOBACTAM 3.38 G: 3; .375 INJECTION, POWDER, FOR SOLUTION INTRAVENOUS at 05:58

## 2025-05-08 RX ADMIN — ONDANSETRON 4 MG: 2 INJECTION INTRAMUSCULAR; INTRAVENOUS at 07:38

## 2025-05-08 RX ADMIN — PHENYLEPHRINE HYDROCHLORIDE 0.4 MCG/KG/MIN: 10 INJECTION INTRAVENOUS at 09:25

## 2025-05-08 RX ADMIN — PROPOFOL 150 MG: 10 INJECTION, EMULSION INTRAVENOUS at 07:59

## 2025-05-08 RX ADMIN — PROCHLORPERAZINE EDISYLATE 5 MG: 5 INJECTION INTRAMUSCULAR; INTRAVENOUS at 10:50

## 2025-05-08 RX ADMIN — LEVOTHYROXINE SODIUM 75 MCG: 0.03 TABLET ORAL at 05:57

## 2025-05-08 RX ADMIN — HYDROMORPHONE HYDROCHLORIDE 2 MG: 2 TABLET ORAL at 13:11

## 2025-05-08 RX ADMIN — PROPOFOL 60 MG: 10 INJECTION, EMULSION INTRAVENOUS at 08:27

## 2025-05-08 RX ADMIN — METRONIDAZOLE 500 MG: 500 INJECTION, SOLUTION INTRAVENOUS at 07:35

## 2025-05-08 RX ADMIN — KETOROLAC TROMETHAMINE 15 MG: 15 INJECTION, SOLUTION INTRAMUSCULAR; INTRAVENOUS at 12:14

## 2025-05-08 RX ADMIN — ACETAMINOPHEN 1000 MG: 500 TABLET ORAL at 18:12

## 2025-05-08 RX ADMIN — KETOROLAC TROMETHAMINE 15 MG: 15 INJECTION, SOLUTION INTRAMUSCULAR; INTRAVENOUS at 21:01

## 2025-05-08 RX ADMIN — SUGAMMADEX 200 MG: 100 INJECTION, SOLUTION INTRAVENOUS at 10:23

## 2025-05-08 RX ADMIN — LABETALOL HYDROCHLORIDE 5 MG: 5 INJECTION, SOLUTION INTRAVENOUS at 08:39

## 2025-05-08 RX ADMIN — FENTANYL CITRATE 50 MCG: 50 INJECTION, SOLUTION INTRAMUSCULAR; INTRAVENOUS at 08:00

## 2025-05-08 RX ADMIN — FENTANYL CITRATE 50 MCG: 50 INJECTION, SOLUTION INTRAMUSCULAR; INTRAVENOUS at 07:51

## 2025-05-08 ASSESSMENT — ACTIVITIES OF DAILY LIVING (ADL)
ADLS_ACUITY_SCORE: 42
ADLS_ACUITY_SCORE: 40
ADLS_ACUITY_SCORE: 40
ADLS_ACUITY_SCORE: 42
ADLS_ACUITY_SCORE: 40
ADLS_ACUITY_SCORE: 42
ADLS_ACUITY_SCORE: 40
ADLS_ACUITY_SCORE: 42
ADLS_ACUITY_SCORE: 40
ADLS_ACUITY_SCORE: 40
ADLS_ACUITY_SCORE: 42
ADLS_ACUITY_SCORE: 40
ADLS_ACUITY_SCORE: 42
ADLS_ACUITY_SCORE: 40
ADLS_ACUITY_SCORE: 40
ADLS_ACUITY_SCORE: 42
ADLS_ACUITY_SCORE: 42

## 2025-05-08 NOTE — BRIEF OP NOTE
[facility]    Brief Operative Note    Pre-operative diagnosis: Diverticulitis of large intestine with perforation without abscess, unspecified bleeding status [K57.20]  Post-operative diagnosis Same as pre-operative diagnosis    Procedure: COLECTOMY, SIGMOID, OPEN; DRAINAGE OF INTRAABDOMINAL ABSCESS, N/A - Abdomen    Surgeon: Surgeons and Role:     * Zohaib Velez MD - Primary     * Cher Odonnell MD - Assisting  Anesthesia: General   Estimated Blood Loss: 75 c    Drains: Michael-Glover  Specimens:   ID Type Source Tests Collected by Time Destination   1 : Sigmoid colon Tissue Large Intestine, Colon, Sigmoid SURGICAL PATHOLOGY EXAM Zohaib Velez MD 5/8/2025  8:51 AM    2 : Section of sigmoid colon Tissue Large Intestine, Colon, Sigmoid SURGICAL PATHOLOGY EXAM Zohaib Velez MD 5/8/2025  9:12 AM      Findings:   Inflamed sigmoid colon with large abscess cavity .  Complications: None.  Implants: * No implants in log *

## 2025-05-08 NOTE — ANESTHESIA PROCEDURE NOTES
TAP Procedure Note    Pre-Procedure   Staff -        Anesthesiologist:  Yvan Ram MD       Performed By: anesthesiologist       Referred By: Zohaib Velez MD       Location: OR       Procedure Start/Stop Times: 5/8/2025 10:15 PM and 5/8/2025 10:25 PM       Pre-Anesthestic Checklist: patient identified, IV checked, site marked, risks and benefits discussed, informed consent, monitors and equipment checked, pre-op evaluation, at physician/surgeon's request and post-op pain management  Timeout:       Correct Patient: Yes        Correct Procedure: Yes        Correct Site: Yes        Correct Position: Yes        Correct Laterality: Yes        Site Marked: Yes  Procedure Documentation  Procedure: TAP         Diagnosis: ABDOMINAL PAIN       Laterality: bilateral       Patient Position: supine       Skin prep: Chloraprep       Local skin infiltration: Nonw.        Needle Gauge: 22.        Needle Length (Inches): 4        1. Ultrasound was used to identify targeted nerve, plexus, vascular marker, or fascial plane and place a needle adjacent to it in real-time.       2. Ultrasound was used to visualize the spread of anesthetic in close proximity to the above referenced structure.       4. The visualized anatomic structures appeared normal.       5. There were no apparent abnormal pathologic findings.    Assessment/Narrative         The placement was negative for: blood aspirated and site bleedingInjection painful: N/a - performed under GA.       : N/a - performed under GA.       Bolus given via needle..        Secured via.        Insertion/Infusion Method: Single Shot (Single shot on each side)       Complications: none       Injection made incrementally with aspirations every 5 mL.    Medication(s) Administered   Bupivacaine 0.5% PF (Infiltration) - Infiltration   30 mL - 5/8/2025 12:23:00 PM  Medication Administration Time: 5/8/2025 10:15 PM      FOR [facility] ([facility]) ONLY:   Pain Team Contact  "information: please page the Pain Team Via [facility]. Search \"Pain\". During daytime hours, please page the attending first. At night please page the resident first.      "

## 2025-05-08 NOTE — ANESTHESIA CARE TRANSFER NOTE
Patient: Michelle Isaac    Procedure: Procedure(s):  COLECTOMY, SIGMOID, OPEN; DRAINAGE OF INTRAABDOMINAL ABSCESS       Diagnosis: Diverticulitis of large intestine with perforation without abscess, unspecified bleeding status [K57.20]  Diagnosis Additional Information: No value filed.    Anesthesia Type:   No value filed.     Note:    Oropharynx: oropharynx clear of all foreign objects and spontaneously breathing  Level of Consciousness: drowsy  Oxygen Supplementation: face mask    Independent Airway: airway patency satisfactory and stable    Vital Signs Stable: post-procedure vital signs reviewed and stable  Report to RN Given: handoff report given  Patient transferred to: PACU    Handoff Report: Identifed the Patient, Identified the Reponsible Provider, Reviewed the pertinent medical history, Discussed the surgical course, Reviewed Intra-OP anesthesia mangement and issues during anesthesia, Set expectations for post-procedure period and Allowed opportunity for questions and acknowledgement of understanding      Vitals:  Vitals Value Taken Time   /70 05/08/25 10:34   Temp 36  C (96.8  F) 05/08/25 10:38   Pulse 74 05/08/25 10:38   Resp 15 05/08/25 10:38   SpO2 100 % 05/08/25 10:38   Vitals shown include unfiled device data.    Electronically Signed By: JAVIER Monson CRNA  May 8, 2025  10:40 AM

## 2025-05-08 NOTE — PROGRESS NOTES
"[facility]    Medicine Progress Note - Hospitalist Service    Date of Admission:  4/27/2025    Assessment & Plan   70-year-old female with recent tendon surgery to RLE presented with abdominal pain and found to have perforated diverticulitis.    #Sigmoid colon diverticulitis with perforation and abscess  #Sepsis  Laparoscopic washout with drain placement 5/1  Persistent leukocytosis prompted repeat CT showing new abscess  IR placed second drain 5/4  s/p open sigmoid colectomy, drainage of intra-abdominal abscess 5/8  Pain and nausea control  ID following: fluconazole and zosyn  Advance diet per GenSurg recs    #Acute blood loss anemia  Monitor and transfuse for Hgb < 7    #Recent RLE tendon repair and trochanteric bursectomy, 4/22  Ortho consulted, not concerned for abscess  Activity per ortho: Brace at all times, flatfoot weightbearing RLE, crutches when ambulating, no active hip abduction or internal rotation, no passive hip external rotation, abduction      DVT PPx: PCDs        Diet: Diet  Snacks/Supplements Adult: Ensure Enlive; Between Meals  Clear Liquid Diet    Luz Catheter: PRESENT, indication:    Lines: None     Cardiac Monitoring: None  Code Status: Full Code      Clinically Significant Risk Factors               # Hypoalbuminemia: Lowest albumin = 3.3 g/dL at 4/28/2025  6:14 AM, will monitor as appropriate                # Overweight: Estimated body mass index is 29.17 kg/m  as calculated from the following:    Height as of 4/22/25: 1.638 m (5' 4.5\").    Weight as of this encounter: 78.3 kg (172 lb 9.6 oz).             Social Drivers of Health    Housing Stability: High Risk (4/28/2025)    Housing Stability     Do you have housing? : No     Are you worried about losing your housing?: No   Physical Activity: Insufficiently Active (3/16/2024)    Received from [facility]    Exercise Vital Sign     Days of Exercise per Week: 3 days     Minutes of Exercise per Session: 30 min        "   Disposition Plan     Medically Ready for Discharge: Anticipated in 2-4 Days             Chava Henao DO  Hospitalist Service  [facility]  Securely message with [employer] (more info)  Text page via [employer] Paging/Directory   ______________________________________________________________________    Interval History   Having pain at surgical site. No nausea    Physical Exam   Vital Signs: Temp: 97.8  F (36.6  C) Temp src: Oral BP: (!) 146/64 Pulse: 64   Resp: 18 SpO2: 97 % O2 Device: None (Room air) Oxygen Delivery: 2 LPM  Weight: 172 lbs 9.6 oz    General Appearance:  No acute distress  ENT: Dry mucous membranes  Respiratory: Clear to auscultation bilaterally  Cardiovascular: Regular rate and rhythm  Neuro: Alert and oriented x 3, normal speech      Medical Decision Making             Data

## 2025-05-08 NOTE — PROGRESS NOTES
General Surgery Progress Note    Patient's course has been slow with ongoing concerns for inadequate drainage.  Her improving leukocytosis stalled out yesterday but is now better today.  Her platelets continue to rise.  She had bloating and poor p.o. tolerance yesterday.     We had a very jennie discussion yesterday about further watching and waiting versus more definitive management with a sigmoid colectomy.  Ultimately we decided to proceed with open sigmoid colectomy.  Patient and and I still feel this is a reasonable course of action this morning even given the improvement in her labs.  We will attempt reconnection but colostomy is definitely possible.    Reviewed the details of surgery including the risk of bleeding, infection, injury to other structures such as ureter, and anastomotic complications if we make an anastomosis.  Patient consents proceed.  We also discussed how to manage her right leg in the setting of recent surgery.  Patient's questions were answered.  Appropriate consent form signed.    Zohaib Velez MD  General Surgeon  [facility]  Surgery [address]  [place] [address]  Office: [phone]

## 2025-05-08 NOTE — ANESTHESIA PREPROCEDURE EVALUATION
"Anesthesia Pre-Procedure Evaluation    Patient: Michelle Isaac   MRN: [id number] : 1955          Procedure : Procedure(s):  COLECTOMY, SIGMOID, OPEN         Past Medical History:   Diagnosis Date    Thyroid disease       Past Surgical History:   Procedure Laterality Date    ABDUCTOR REPAIR, HIP Right 2025    Procedure: OPEN RIGHT HIP GLUTEUS MEDIUS TENDON REPAIR,;  Surgeon: Kaleb Issa MD;  [facility] Main [facility]    BURSECTOMY HIP Left 2024    Procedure: TROCHANTERIC BURSECTOMY;  Surgeon: Kaleb Issa MD;  [facility]    BURSECTOMY HIP Right 2025    Procedure: TROCHANTERIC BURSECTOMY RIGHT;  Surgeon: Kaleb Issa MD;  Location: [facility] Main OR    LAPAROSCOPY DIAGNOSTIC (GYN) N/A 2025    Procedure: LAPAROSCOPIC LAVAGE; DRAINAGE INTRA-ABDOMINAL ABSCESS;  Surgeon: Zohaib Velez MD;  Location: [facility] Main OR    REPAIR TENDON HAMSTRING Left 2024    Procedure: OPEN LEFT HIP GLUTEUS MEDIUS TENDON REPAIR WITH ILIOTIBIAL BAND LENGTHENING;  Surgeon: Kaleb Issa MD;  Location: [facility] Main OR      Allergies   Allergen Reactions    Ciprofloxacin Hives and Rash     \"Many years ago\"    Oxycodone Other (See Comments)     Does NOT like-makes her feel weird, dizzy, weak      Social History     Tobacco Use    Smoking status: Never    Smokeless tobacco: Never   Substance Use Topics    Alcohol use: Not Currently      Wt Readings from Last 1 Encounters:   25 78.3 kg (172 lb 9.6 oz)        Anesthesia Evaluation            ROS/MED HX  ENT/Pulmonary:  - neg pulmonary ROS     Neurologic:       Cardiovascular:     (+) Dyslipidemia - -   -  - -                                      METS/Exercise Tolerance:     Hematologic:       Musculoskeletal:       GI/Hepatic: Comment: Divertculitis      Renal/Genitourinary:       Endo:     (+)          thyroid problem,            Psychiatric/Substance Use:       Infectious Disease:       Malignancy:     " "  Other:              Physical Exam  Airway  Mallampati: II  TM distance: >3 FB  Neck ROM: full  Mouth opening: >= 4 cm    Cardiovascular   Rhythm: regular  Rate: normal rate     Dental   (+) Completely normal teeth      Pulmonary Breath sounds clear to auscultation        Neurological   Other Findings       OUTSIDE LABS:  CBC:   Lab Results   Component Value Date    WBC 10.5 05/08/2025    WBC 12.6 (H) 05/07/2025    HGB 9.3 (L) 05/08/2025    HGB 9.3 (L) 05/07/2025    HCT 28.7 (L) 05/08/2025    HCT 28.5 (L) 05/07/2025     (H) 05/08/2025     (H) 05/07/2025     BMP:   Lab Results   Component Value Date     05/07/2025     (L) 05/06/2025    POTASSIUM 3.7 05/07/2025    POTASSIUM 3.6 05/06/2025    CHLORIDE 100 05/07/2025    CHLORIDE 100 05/06/2025    CO2 29 05/07/2025    CO2 25 05/06/2025    BUN 5.8 (L) 05/07/2025    BUN 6.5 (L) 05/06/2025    CR 0.75 05/07/2025    CR 0.77 05/06/2025     (H) 05/07/2025     (H) 05/06/2025     COAGS:   Lab Results   Component Value Date    PTT 31 04/27/2025    INR 1.16 (H) 04/28/2025     POC: No results found for: \"BGM\", \"HCG\", \"HCGS\"  HEPATIC:   Lab Results   Component Value Date    ALBUMIN 3.5 05/01/2025    PROTTOTAL 6.6 05/01/2025    ALT 65 (H) 05/01/2025    AST 23 05/01/2025    ALKPHOS 350 (H) 05/01/2025    BILITOTAL 0.4 05/01/2025     OTHER:   Lab Results   Component Value Date    LACT 1.4 04/27/2025    ZACHARIAH 8.6 (L) 05/07/2025    PHOS 3.1 05/07/2025    MAG 2.0 05/06/2025       Anesthesia Plan    ASA Status:  2    Anesthesia Type: general.   Induction: intravenous.   Techniques and Equipment:       - Monitoring Plan: standard ASA monitoring.     Consents    Anesthesia Plan(s) and associated risks, benefits, and realistic alternatives discussed. Questions answered and patient/representative(s) expressed understanding.     - Discussed: anesthesiologist     - Discussed with:  Patient       Blood Consent:      - Discussed with: not discussed. " "    Postoperative Care    Pain management: non-narcotic analgesics, plan for postoperative opioid use, peripheral nerve block.        Comments:    Other Comments: Active gardening and does stairs in 3 level home, > 4 METs. She is concerned about pain, made a multimodal plan with her. Was dizzy with oxycodone. Consented for TAP block if needed for pain management.             Yvan Ram MD    I have reviewed the pertinent notes and labs in the chart from the past 30 days and (re)examined the patient.  Any updates or changes from those notes are reflected in this note.    Clinically Significant Risk Factors               # Hypoalbuminemia: Lowest albumin = 3.3 g/dL at 4/28/2025  6:14 AM, will monitor as appropriate                # Overweight: Estimated body mass index is 29.17 kg/m  as calculated from the following:    Height as of 4/22/25: 1.638 m (5' 4.5\").    Weight as of this encounter: 78.3 kg (172 lb 9.6 oz).                   "

## 2025-05-08 NOTE — ANESTHESIA PROCEDURE NOTES
Airway       Patient location during procedure: OR       Procedure Start/Stop Times: 5/8/2025 8:02 AM  Staff -        CRNA: Sarbjit Najera APRN CRNA       Other Anesthesia Staff: Sheila Lobato       Performed By: CRNA and SRNA  Consent for Airway        Urgency: elective  Indications and Patient Condition       Indications for airway management: derik-procedural       Induction type:intravenous       Mask difficulty assessment: 1 - vent by mask    Final Airway Details       Final airway type: endotracheal airway       Successful airway: ETT - single  Endotracheal Airway Details        ETT size (mm): 7.0       Cuffed: yes       Successful intubation technique: video laryngoscopy       VL Blade Size: Glidescope 3       Grade View of Cords: 1       Adjucts: stylet       Position: Right       Measured from: lips       Secured at (cm): 22       Bite block used: None    Post intubation assessment        Placement verified by: capnometry, equal breath sounds and chest rise        Number of attempts at approach: 1       Number of other approaches attempted: 0       Secured with: tape       Ease of procedure: easy       Dentition: Intact and Unchanged    Medication(s) Administered   Medication Administration Time: 5/8/2025 8:02 AM

## 2025-05-08 NOTE — PLAN OF CARE
Goal Outcome Evaluation:    Pt came back from surgery at 1150 today. Had an open sigmoid colectomy with Dr. Velez, colorectal. Midline incision/Gluteal incision/BEKA dsg CDI. BEKA patent and draining. No need to flush per Dr. Velez. Serosanguinous output noted. Luz patent and draining. Pt did complain of abdominal pain related to surgery. PRN Dilaudid and Toradol given. No nausea noted. Tolerating clear liquid diet. Afebrile. IV Zosyn given. IVF. Pt is alert and orientated. Up with an assist of one with transfers and ambulation. Family at the bedside. Will continue to monitor.

## 2025-05-08 NOTE — PROGRESS NOTES
Pt had liquid stools and undigested food carrot from the night before, pt also had incontinent episode. Karlene pt had jello.

## 2025-05-08 NOTE — ANESTHESIA POSTPROCEDURE EVALUATION
Patient: Michelle Isaac    Procedure: Procedure(s):  COLECTOMY, SIGMOID, OPEN; DRAINAGE OF INTRAABDOMINAL ABSCESS       Anesthesia Type:  No value filed.    Note:  Disposition: Outpatient   Postop Pain Control: Challenging   PONV: No   Neuro/Psych: Uneventful            Sign Out: Acceptable/Baseline neuro status   Airway/Respiratory: Uneventful            Sign Out: Acceptable/Baseline resp. status   CV/Hemodynamics: Uneventful            Sign Out: Acceptable CV status; No obvious hypovolemia; No obvious fluid overload   Other NRE: NONE   DID A NON-ROUTINE EVENT OCCUR? No           Last vitals:  Vitals Value Taken Time   /57 05/08/25 11:30   Temp 36.5  C (97.7  F) 05/08/25 11:30   Pulse 64 05/08/25 11:30   Resp 18 05/08/25 11:30   SpO2 96 % 05/08/25 11:30       Electronically Signed By: Yvan Ram MD  May 8, 2025  12:51 PM

## 2025-05-08 NOTE — PLAN OF CARE
Problem: Adult Inpatient Plan of Care  Goal: Plan of Care Review  Description: The Plan of Care Review/Shift note should be completed every shift.  The Outcome Evaluation is a brief statement about your assessment that the patient is improving, declining, or no change.  This information will be displayed automatically on your shiftnote.  Outcome: Progressing  Flowsheets (Taken 5/7/2025 2357)  Plan of Care Reviewed With: patient  Overall Patient Progress: improving     Problem: Pain Acute  Goal: Optimal Pain Control and Function  Outcome: Progressing  Intervention: Prevent or Manage Pain  Recent Flowsheet Documentation  Taken 5/7/2025 1600 by Sandee Thomas, RN  Medication Review/Management: medications reviewed     Problem: Intestinal Obstruction  Goal: Optimal Bowel Function  Outcome: Progressing  Intervention: Promote Bowel Function  Recent Flowsheet Documentation  Taken 5/7/2025 1600 by Sandee Thomas, RN  Body Position:   position changed independently   position maintained  Head of Bed (HOB) Positioning: HOB at 30 degrees  Positioning/Transfer Devices:   pillows   in use   Goal Outcome Evaluation:      Plan of Care Reviewed With: patient    Overall Patient Progress: improvingOverall Patient Progress: improving     Pt was complaining of gas more than pain doctor called Protonix ordered and given, Pt said felt much better and had BM some undigested food. Pt has commode by the bed says will use that tonight. BEKA S IN PLACE # 1 had 15 ml , #2 had 0. Plan of care discussed pt aware will be NPO tonight. Pt had jello for dinner.

## 2025-05-08 NOTE — PLAN OF CARE
Goal Outcome Evaluation:         Problem: Adult Inpatient Plan of Care  Goal: Optimal Comfort and Wellbeing  Outcome: Progressing  Intervention: Monitor Pain and Promote Comfort  Recent Flowsheet Documentation  Taken 5/7/2025 2348 by Cristy Ha RN  Pain Management Interventions: medication (see MAR)  Intervention: Provide Person-Centered Care  Recent Flowsheet Documentation  Taken 5/8/2025 0000 by Cristy Ha RN  Trust Relationship/Rapport: care explained     Problem: Delirium  Goal: Improved Sleep  Outcome: Progressing     Problem: Pain Acute  Goal: Optimal Pain Control and Function  Outcome: Progressing  Intervention: Develop Pain Management Plan  Recent Flowsheet Documentation  Taken 5/7/2025 2348 by Cristy Ha RN  Pain Management Interventions: medication (see MAR)  Intervention: Prevent or Manage Pain  Recent Flowsheet Documentation  Taken 5/8/2025 0000 by Cristy Ha RN  Medication Review/Management: medications reviewed         Pt AxO x4, VSS on RA pain 1/10. IND in room and to commode. BEKA drains minimal to no output clear green color. Pt picked up for surgery this AM.

## 2025-05-09 LAB
ANION GAP SERPL CALCULATED.3IONS-SCNC: 10 MMOL/L (ref 7–15)
BACTERIA ABSC ANAEROBE+AEROBE CULT: ABNORMAL
BASOPHILS # BLD AUTO: 0 10E3/UL (ref 0–0.2)
BASOPHILS NFR BLD AUTO: 0 %
BUN SERPL-MCNC: 7.7 MG/DL (ref 8–23)
CALCIUM SERPL-MCNC: 8.7 MG/DL (ref 8.8–10.4)
CHLORIDE SERPL-SCNC: 99 MMOL/L (ref 98–107)
CREAT SERPL-MCNC: 0.79 MG/DL (ref 0.51–0.95)
EGFRCR SERPLBLD CKD-EPI 2021: 79 ML/MIN/1.73M2
EOSINOPHIL # BLD AUTO: 0 10E3/UL (ref 0–0.7)
EOSINOPHIL NFR BLD AUTO: 0 %
ERYTHROCYTE [DISTWIDTH] IN BLOOD BY AUTOMATED COUNT: 13.6 % (ref 10–15)
GLUCOSE SERPL-MCNC: 118 MG/DL (ref 70–99)
HCO3 SERPL-SCNC: 26 MMOL/L (ref 22–29)
HCT VFR BLD AUTO: 24.6 % (ref 35–47)
HGB BLD-MCNC: 7.9 G/DL (ref 11.7–15.7)
IMM GRANULOCYTES # BLD: 0.2 10E3/UL
IMM GRANULOCYTES NFR BLD: 1 %
LYMPHOCYTES # BLD AUTO: 2 10E3/UL (ref 0.8–5.3)
LYMPHOCYTES NFR BLD AUTO: 14 %
MCH RBC QN AUTO: 28.2 PG (ref 26.5–33)
MCHC RBC AUTO-ENTMCNC: 32.1 G/DL (ref 31.5–36.5)
MCV RBC AUTO: 88 FL (ref 78–100)
MONOCYTES # BLD AUTO: 0.8 10E3/UL (ref 0–1.3)
MONOCYTES NFR BLD AUTO: 6 %
NEUTROPHILS # BLD AUTO: 10.9 10E3/UL (ref 1.6–8.3)
NEUTROPHILS NFR BLD AUTO: 78 %
NRBC # BLD AUTO: 0 10E3/UL
NRBC BLD AUTO-RTO: 0 /100
PATH REPORT.COMMENTS IMP SPEC: NORMAL
PATH REPORT.COMMENTS IMP SPEC: NORMAL
PATH REPORT.FINAL DX SPEC: NORMAL
PATH REPORT.GROSS SPEC: NORMAL
PATH REPORT.MICROSCOPIC SPEC OTHER STN: NORMAL
PATH REPORT.RELEVANT HX SPEC: NORMAL
PHOTO IMAGE: NORMAL
PLATELET # BLD AUTO: 607 10E3/UL (ref 150–450)
POTASSIUM SERPL-SCNC: 4.3 MMOL/L (ref 3.4–5.3)
RBC # BLD AUTO: 2.8 10E6/UL (ref 3.8–5.2)
SODIUM SERPL-SCNC: 135 MMOL/L (ref 135–145)
WBC # BLD AUTO: 13.9 10E3/UL (ref 4–11)

## 2025-05-09 PROCEDURE — 82565 ASSAY OF CREATININE: CPT | Performed by: HOSPITALIST

## 2025-05-09 PROCEDURE — 88307 TISSUE EXAM BY PATHOLOGIST: CPT | Mod: 26 | Performed by: PATHOLOGY

## 2025-05-09 PROCEDURE — 85025 COMPLETE CBC W/AUTO DIFF WBC: CPT | Performed by: HOSPITALIST

## 2025-05-09 PROCEDURE — 250N000013 HC RX MED GY IP 250 OP 250 PS 637

## 2025-05-09 PROCEDURE — 99232 SBSQ HOSP IP/OBS MODERATE 35: CPT | Performed by: INTERNAL MEDICINE

## 2025-05-09 PROCEDURE — 250N000011 HC RX IP 250 OP 636: Mod: JZ

## 2025-05-09 PROCEDURE — 258N000003 HC RX IP 258 OP 636

## 2025-05-09 PROCEDURE — 250N000013 HC RX MED GY IP 250 OP 250 PS 637: Performed by: HOSPITALIST

## 2025-05-09 PROCEDURE — 120N000001 HC R&B MED SURG/OB

## 2025-05-09 PROCEDURE — 250N000011 HC RX IP 250 OP 636

## 2025-05-09 PROCEDURE — 36415 COLL VENOUS BLD VENIPUNCTURE: CPT | Performed by: HOSPITALIST

## 2025-05-09 PROCEDURE — 999N000198 HC STATISTIC WOC PT EDUCATION, 16-30 MIN

## 2025-05-09 PROCEDURE — 99024 POSTOP FOLLOW-UP VISIT: CPT

## 2025-05-09 PROCEDURE — 99232 SBSQ HOSP IP/OBS MODERATE 35: CPT | Performed by: HOSPITALIST

## 2025-05-09 RX ORDER — NALOXONE HYDROCHLORIDE 0.4 MG/ML
0.2 INJECTION, SOLUTION INTRAMUSCULAR; INTRAVENOUS; SUBCUTANEOUS
Status: DISCONTINUED | OUTPATIENT
Start: 2025-05-09 | End: 2025-05-09

## 2025-05-09 RX ORDER — KETOROLAC TROMETHAMINE 15 MG/ML
15 INJECTION, SOLUTION INTRAMUSCULAR; INTRAVENOUS EVERY 6 HOURS
Status: DISCONTINUED | OUTPATIENT
Start: 2025-05-09 | End: 2025-05-09

## 2025-05-09 RX ORDER — METHOCARBAMOL 500 MG/1
500 TABLET, FILM COATED ORAL 3 TIMES DAILY
Status: DISCONTINUED | OUTPATIENT
Start: 2025-05-09 | End: 2025-05-09

## 2025-05-09 RX ORDER — PANTOPRAZOLE SODIUM 40 MG/1
40 TABLET, DELAYED RELEASE ORAL
Status: DISCONTINUED | OUTPATIENT
Start: 2025-05-09 | End: 2025-05-12

## 2025-05-09 RX ORDER — NALOXONE HYDROCHLORIDE 0.4 MG/ML
0.4 INJECTION, SOLUTION INTRAMUSCULAR; INTRAVENOUS; SUBCUTANEOUS
Status: DISCONTINUED | OUTPATIENT
Start: 2025-05-09 | End: 2025-05-09

## 2025-05-09 RX ORDER — NALOXONE HYDROCHLORIDE 0.4 MG/ML
0.4 INJECTION, SOLUTION INTRAMUSCULAR; INTRAVENOUS; SUBCUTANEOUS
Status: DISCONTINUED | OUTPATIENT
Start: 2025-05-09 | End: 2025-05-12 | Stop reason: HOSPADM

## 2025-05-09 RX ORDER — METHOCARBAMOL 500 MG/1
500 TABLET, FILM COATED ORAL 3 TIMES DAILY
Status: DISCONTINUED | OUTPATIENT
Start: 2025-05-09 | End: 2025-05-12 | Stop reason: HOSPADM

## 2025-05-09 RX ORDER — NALOXONE HYDROCHLORIDE 0.4 MG/ML
0.2 INJECTION, SOLUTION INTRAMUSCULAR; INTRAVENOUS; SUBCUTANEOUS
Status: DISCONTINUED | OUTPATIENT
Start: 2025-05-09 | End: 2025-05-12 | Stop reason: HOSPADM

## 2025-05-09 RX ORDER — KETOROLAC TROMETHAMINE 15 MG/ML
15 INJECTION, SOLUTION INTRAMUSCULAR; INTRAVENOUS EVERY 6 HOURS
Status: DISCONTINUED | OUTPATIENT
Start: 2025-05-09 | End: 2025-05-11

## 2025-05-09 RX ADMIN — ATORVASTATIN CALCIUM 20 MG: 10 TABLET, FILM COATED ORAL at 20:15

## 2025-05-09 RX ADMIN — METHOCARBAMOL 500 MG: 500 TABLET ORAL at 13:31

## 2025-05-09 RX ADMIN — ACETAMINOPHEN 1000 MG: 500 TABLET ORAL at 12:54

## 2025-05-09 RX ADMIN — PIPERACILLIN AND TAZOBACTAM 3.38 G: 3; .375 INJECTION, POWDER, FOR SOLUTION INTRAVENOUS at 06:33

## 2025-05-09 RX ADMIN — FLUCONAZOLE 200 MG: 200 TABLET ORAL at 10:03

## 2025-05-09 RX ADMIN — SODIUM CHLORIDE, SODIUM LACTATE, POTASSIUM CHLORIDE, AND CALCIUM CHLORIDE: .6; .31; .03; .02 INJECTION, SOLUTION INTRAVENOUS at 09:03

## 2025-05-09 RX ADMIN — KETOROLAC TROMETHAMINE 15 MG: 15 INJECTION, SOLUTION INTRAMUSCULAR; INTRAVENOUS at 20:15

## 2025-05-09 RX ADMIN — ACETAMINOPHEN 1000 MG: 500 TABLET ORAL at 06:36

## 2025-05-09 RX ADMIN — PANTOPRAZOLE SODIUM 40 MG: 40 TABLET, DELAYED RELEASE ORAL at 13:31

## 2025-05-09 RX ADMIN — METHOCARBAMOL 500 MG: 500 TABLET ORAL at 20:15

## 2025-05-09 RX ADMIN — HYDROMORPHONE HYDROCHLORIDE 2 MG: 2 TABLET ORAL at 01:54

## 2025-05-09 RX ADMIN — PIPERACILLIN AND TAZOBACTAM 3.38 G: 3; .375 INJECTION, POWDER, FOR SOLUTION INTRAVENOUS at 12:48

## 2025-05-09 RX ADMIN — LEVOTHYROXINE SODIUM 75 MCG: 0.03 TABLET ORAL at 06:37

## 2025-05-09 RX ADMIN — PIPERACILLIN AND TAZOBACTAM 3.38 G: 3; .375 INJECTION, POWDER, FOR SOLUTION INTRAVENOUS at 00:20

## 2025-05-09 RX ADMIN — PIPERACILLIN AND TAZOBACTAM 3.38 G: 3; .375 INJECTION, POWDER, FOR SOLUTION INTRAVENOUS at 18:22

## 2025-05-09 RX ADMIN — ACETAMINOPHEN 1000 MG: 500 TABLET ORAL at 00:19

## 2025-05-09 RX ADMIN — KETOROLAC TROMETHAMINE 15 MG: 15 INJECTION, SOLUTION INTRAMUSCULAR; INTRAVENOUS at 04:10

## 2025-05-09 RX ADMIN — HYDROMORPHONE HYDROCHLORIDE 2 MG: 2 TABLET ORAL at 06:37

## 2025-05-09 RX ADMIN — ACETAMINOPHEN 1000 MG: 500 TABLET ORAL at 18:27

## 2025-05-09 RX ADMIN — METHOCARBAMOL 500 MG: 500 TABLET ORAL at 10:23

## 2025-05-09 ASSESSMENT — ACTIVITIES OF DAILY LIVING (ADL)
ADLS_ACUITY_SCORE: 42

## 2025-05-09 NOTE — PROGRESS NOTES
[facility]    5.9.25 Chart reviewed and spoke with patient. No stoma needed. WOC will s/o at this time.    PRE-OP OSTOMY CONSULTATION AND ASSESSMENT    INTAKE    Type of Stoma Planned: Permanent v. Temporary Colostomy    Diagnosis Pertinent to Stoma: Perforation  Date of Diagnosis: May 2025  Type of Surgery: Resection with possible ostomy Surgery Date: 5/8/25  Surgeon: Belle     Hospital: [facility]    Current Living Situation: House  Anticipated Discharge Location Post Hospital Stay: House    EDUCATION ASSESSMENT  Present for Teaching Session: Patient   Present: NA    Significant Vision Issues: No  Hand Dexterity Issues: No    Pertinent Information/Identified Barriers to Independent Care: possible ostomy only - patient very hopeful one won't be needed - no teaching today as patient not interested at this time    Stoma Site Marking    Assessed Patient while: Lying, Sitting, and Standing  Marked in RLQ and LLQ with: Permanent Marker and Covered with Tegaderm    EDUCATION    Teaching Folder Given: NA  Instruction: WOC Role and Pouching Products: Showed pouching system    Total Time Spent with Patient: 20 minutes

## 2025-05-09 NOTE — PLAN OF CARE
Problem: Pain Acute  Goal: Optimal Pain Control and Function  Outcome: Progressing  Intervention: Prevent or Manage Pain  Recent Flowsheet Documentation  Taken 5/9/2025 0100 by Ronnie Jacobsen RN  Medication Review/Management: medications reviewed     Problem: Surgery Nonspecified  Goal: Absence of Bleeding  Outcome: Progressing  Goal: Effective Bowel Elimination  Outcome: Progressing  Goal: Fluid and Electrolyte Balance  Outcome: Progressing  Goal: Blood Glucose Level Within Targeted Range  Outcome: Progressing  Goal: Absence of Infection Signs and Symptoms  Outcome: Progressing  Goal: Anesthesia/Sedation Recovery  Outcome: Progressing  Intervention: Optimize Anesthesia Recovery  Recent Flowsheet Documentation  Taken 5/9/2025 0100 by Ronnie Jacobsen, RN  Safety Promotion/Fall Prevention:   activity supervised   assistive device/personal items within reach   lighting adjusted   increased rounding and observation   increase visualization of patient   room near nurse's station   nonskid shoes/slippers when out of bed   patient and family education   room organization consistent  Goal: Optimal Pain Control and Function  Outcome: Progressing  Goal: Nausea and Vomiting Relief  Outcome: Progressing  Goal: Effective Urinary Elimination  Outcome: Progressing  Goal: Effective Oxygenation and Ventilation  Outcome: Progressing  Intervention: Optimize Oxygenation and Ventilation  Recent Flowsheet Documentation  Taken 5/9/2025 0100 by Ronnie Jacobsen, RN  Activity Management:   activity adjusted per tolerance   activity encouraged     Problem: Surgery Nonspecified  Goal: Effective Urinary Elimination  Outcome: Progressing   Goal Outcome Evaluation:        Pt AxO x4,c/o abd pain found some relief with prn dilaudid, Toradol and schedule Tylenol. Ice pack applied to abd. Dressing c/d/I. BEKA drains patent with bright red output..LR running at 100ml/hr . Luz intact with clear output.  /61 (BP Location: Left arm)   Pulse 67    Temp 97.9  F (36.6  C) (Oral)   Resp 14   Wt 78.3 kg (172 lb 9.6 oz)   SpO2 95%   BMI 29.17 kg/m

## 2025-05-09 NOTE — PROGRESS NOTES
"[facility]    Medicine Progress Note - Hospitalist Service    Date of Admission:  4/27/2025    Assessment & Plan   70-year-old female with recent tendon surgery to RLE presented with abdominal pain and found to have perforated diverticulitis.    #Sigmoid colon diverticulitis with perforation and abscess  #Sepsis  Laparoscopic washout with drain placement 5/1  Persistent leukocytosis prompted repeat CT showing new abscess  IR placed second drain 5/4  s/p open sigmoid colectomy, drainage of intra-abdominal abscess 5/8  Pain and nausea control  ID following: fluconazole and zosyn  Advance diet per [facility]ristian[facility]ah[facility] recs    #Acute blood loss anemia  Monitor and transfuse for Hgb < 7    #Recent RLE tendon repair and trochanteric bursectomy, 4/22  Ortho consulted, not concerned for abscess  Activity per ortho: Brace at all times, flatfoot weightbearing RLE, crutches when ambulating, no active hip abduction or internal rotation, no passive hip external rotation, abduction  PT, OT      DVT PPx: PCDs          Diet: Diet  Snacks/Supplements Adult: Ensure Enlive; Between Meals  Clear Liquid Diet    Luz Catheter: PRESENT, indication: Surgical procedure  Lines: None     Cardiac Monitoring: None  Code Status: Full Code      Clinically Significant Risk Factors               # Hypoalbuminemia: Lowest albumin = 3.3 g/dL at 4/28/2025  6:14 AM, will monitor as appropriate                # Overweight: Estimated body mass index is 29.17 kg/m  as calculated from the following:    Height as of 4/22/25: 1.638 m (5' 4.5\").    Weight as of this encounter: 78.3 kg (172 lb 9.6 oz).             Social Drivers of Health    Housing Stability: High Risk (4/28/2025)    Housing Stability     Do you have housing? : No     Are you worried about losing your housing?: No   Physical Activity: Insufficiently Active (3/16/2024)    [facility]    Exercise Vital Sign     Days of Exercise per Week: 3 days     Minutes of Exercise " per Session: 30 min          Disposition Plan     Medically Ready for Discharge: Anticipated in 2-4 Days             Chava Henao DO  Hospitalist Service  [facility]  Securely message with [employer] (more info)  Text page via [employer] Paging/Directory   ______________________________________________________________________    Interval History   Feeling better today. Passing little gas.    Physical Exam   Vital Signs: Temp: 97.5  F (36.4  C) Temp src: Oral BP: 132/61 Pulse: 74   Resp: 14 SpO2: 97 % O2 Device: None (Room air)    Weight: 172 lbs 9.6 oz    General Appearance:  No acute distress  Respiratory: Clear to auscultation bilaterally  Cardiovascular: Regular rate and rhythm  GI: Normal bowel sounds, abdomen is soft, distended with no rebound.  Midline incision covered with dressing with scant bloody drainage    Medical Decision Making             Data

## 2025-05-09 NOTE — PROGRESS NOTES
"[facility] Progress Note    ASSESSMENT:  Peritonitis, post washout (first non open, yesterday open) with operative note finding abscess(es)  New abscess now post op, manifested in the classic way with rising white count while on abx.  Newest abscess with pseudomonas  Abscesses found may 8th    RECOMMENDATION:  Likely to need minimum 2 wks augmentin and fluc at Discharge date which maybe will be Monday, we will see that day.      Dr Frausto        SUBJECTIVE: Had surgery.  Able to avoid ostomy.  White count up, but that is supposed to be the case post op.     REVIEW OF SYSTEMS:  Negative unless as listed above.  Social history, Family history, Medications: reviewed.    OBJECTIVE:  /58 (BP Location: Left arm)   Pulse 71   Temp 98.5  F (36.9  C) (Oral)   Resp 14   Wt 78.3 kg (172 lb 9.6 oz)   SpO2 96%   BMI 29.17 kg/m                PHYSICAL EXAM:  Alert, awake  Vitals tabulated above, reviewed  Neck supple without lymphadenopathy  Sclera normal color, not injected  CARDIOVASCULAR regular rate and rhythm, no murmur  Lungs CLEAR TO AUSCULTATION   Abdomen soft  Midline gut wound  Skin normal  Joints normal  Neurologic exam non focal    Antibiotics:z/fluc    Pertinent labs:    Recent Labs   Lab Test 05/09/25  0627 05/08/25  0557 05/07/25  0541   WBC 13.9* 10.5 12.6*   HGB 7.9* 9.3* 9.3*   HCT 24.6* 28.7* 28.5*   MCV 88 87 87   * 530* 498*       Lab Results   Component Value Date    RBC 3.47 05/02/2025     Lab Results   Component Value Date    HGB 10.0 05/02/2025    HGB 10.0 05/02/2025     Lab Results   Component Value Date    HCT 30.7 05/02/2025     No components found for: \"MCT\"  Lab Results   Component Value Date    MCV 89 05/02/2025     Lab Results   Component Value Date    MCH 28.8 05/02/2025     Lab Results   Component Value Date    MCHC 32.6 05/02/2025     Lab Results   Component Value Date    RDW 13.0 05/02/2025     Lab Results   Component Value Date     05/02/2025 " "      Last Comprehensive Metabolic Panel:  Sodium   Date Value Ref Range Status   05/09/2025 135 135 - 145 mmol/L Final     Potassium   Date Value Ref Range Status   05/09/2025 4.3 3.4 - 5.3 mmol/L Final     Chloride   Date Value Ref Range Status   05/09/2025 99 98 - 107 mmol/L Final     Carbon Dioxide (CO2)   Date Value Ref Range Status   05/09/2025 26 22 - 29 mmol/L Final     Anion Gap   Date Value Ref Range Status   05/09/2025 10 7 - 15 mmol/L Final     Glucose   Date Value Ref Range Status   05/09/2025 118 (H) 70 - 99 mg/dL Final     GLUCOSE BY METER POCT   Date Value Ref Range Status   05/08/2025 161 (H) 70 - 99 mg/dL Final     Urea Nitrogen   Date Value Ref Range Status   05/09/2025 7.7 (L) 8.0 - 23.0 mg/dL Final     Creatinine   Date Value Ref Range Status   05/09/2025 0.79 0.51 - 0.95 mg/dL Final     GFR Estimate   Date Value Ref Range Status   05/09/2025 79 >60 mL/min/1.73m2 Final     Comment:     eGFR calculated using 2021 CKD-EPI equation.     Calcium   Date Value Ref Range Status   05/09/2025 8.7 (L) 8.8 - 10.4 mg/dL Final       Liver Function Studies -   Recent Labs   Lab Test 05/01/25  0713   PROTTOTAL 6.6   ALBUMIN 3.5   BILITOTAL 0.4   ALKPHOS 350*   AST 23   ALT 65*       No results found for: \"SED\"    No results found for: \"CRP\"            MICROBIOLOGY DATA:  PA and candida lusitanae      IMAGING/RADIOLOGY:                                                                       1.  New left pelvic abscess measuring 9.9 x 4.9 cm.  2.  Decreased in size posterior pelvic abscess (2.1 x 6.9 cm, previously 4 x 7.8 cm).  3.  Slight improved sigmoid colonic wall thickening with persistent small to moderate volume pneumoperitoneum.    "

## 2025-05-09 NOTE — PROGRESS NOTES
"CLINICAL NUTRITION SERVICES - F/U    INFORMATION OBTAINED  s/p open sigmoid colectomy, drainage of intra-abdominal abscess 5/8     Pt reported tolerating Ensure scheduled until felt \"blocked\" and refluxed some of it prior to surgery. For this reason she doesn't want to try it again until after a while. Agreeable to trial Protein Gelatein now though.     Pt had many F/U questions about her gradual diet advancement post surgery, then also we got into a conversation about things to try to address her report of chronic bloating, which she added her mom had too. Bloating has been ongoing for a long time, seems to resolve at times but then come back in force and cause remains unclear.    CURRENT NUTRITION ORDERS  Diet: CLD  Snacks/Supplements: Formerly Ensure Enlive BID    CURRENT INTAKE/TOLERANCE  Reduced PO leading up to surgery 5/8 and CLD since     NEW FINDINGS  GI symptoms: pt feels bloated  Skin/wounds: surg site doesn't hurt /pt  Nutrition-relevant labs: Reviewed  Nutrition-relevant medications: Reviewed    INTERVENTIONS  Replace Ensure BID with Protein Gelatein Plus BID (10am+2PM): these will offer pt 160 kcal and 40 g protein /d.  [facility]: pt may order nutrition supplements PRN, and pt should be able to order small/frequent meals.  Q&A w/pt RE temporary low fiber diet, and gradual advancement.  Suggest pt try doing a food journal and look for causes of bloating.     MONITORING/EVALUATION  Progress toward goals will be monitored and evaluated per policy.    "

## 2025-05-09 NOTE — PROGRESS NOTES
"General Surgery Progress Note:    Hospital Day # 12    ASSESSMENT:  1. Gluteal tendinitis of right buttock    2. Diverticulitis of large intestine with perforation without abscess, unspecified bleeding status        Michelle Isaac is a 70 year old female s/p laparoscopic lavage and drain placement 5/1 for perforated sigmoid diverticulitis, interval CT scan revealing pelvic fluid collection s/p IR drain placement with dark brown output and is s/p open sigmoidectomy on 05/08/2025.     POD1, reporting midline incisional pain as well as lower pelvic \"gas\" pains that is improving with flatus as well as multimodal pain control. Labs notable for worsening leukocytosis without fever, Hgb with slight downtrend without tachycardia. On exam, she is fairly distended and appropriate tender to mid and lower abdomen. Incisions CDI. Drain with serosanguinous OP. Plan to continue CLD, white removal today and pain control.      **Patient canNOT abduct her right leg due to recent hip repair**     PLAN:  -CLD as tolerated, may upgrade later today   -Activity as tolerated deferring to ortho recommendations  -PT/OT  -Saline lock IVF  -Continue ABX per ID  -CBC trend daily  -Scheduled tylenol, toradol and robaxin, PRN PO dilaudid  -Continue drain in place  -Surgery to follow     SUBJECTIVE:   She is reporting feeling okay, midline incisional pain as well left lower abdominal pain reporting as \"gas\".  Has had 3+ episodes this morning with slight improvement.  Pain further well-controlled with scheduled Tylenol, Toradol and as needed oral Dilaudid.  White catheter replaced with light yellow urine.  Not gotten out of bed since surgery with plans for further mobility today however fairly anxious, like PT and OT to see her today.     Patient Vitals for the past 24 hrs:   BP Temp Temp src Pulse Resp SpO2   05/09/25 0721 129/60 97.5  F (36.4  C) Oral 71 14 97 %   05/08/25 2347 116/61 97.9  F (36.6  C) Oral 67 14 95 %   05/08/25 1957 122/57 " 98.1  F (36.7  C) Oral 70 13 97 %   05/08/25 1900 116/62 97.7  F (36.5  C) Oral 68 16 93 %   05/08/25 1800 138/63 98.2  F (36.8  C) Oral 74 17 96 %   05/08/25 1704 120/56 97.6  F (36.4  C) Oral 74 20 95 %   05/08/25 1559 131/69 98.6  F (37  C) Oral 72 20 --   05/08/25 1448 119/66 98.6  F (37  C) Oral 77 18 96 %   05/08/25 1350 117/59 98  F (36.7  C) Oral 58 20 99 %   05/08/25 1250 125/59 97.6  F (36.4  C) Oral 68 16 99 %   05/08/25 1225 (!) 146/64 97.8  F (36.6  C) Oral 64 18 97 %   05/08/25 1148 136/62 97.5  F (36.4  C) Oral 58 20 95 %   05/08/25 1130 120/57 97.7  F (36.5  C) Temporal 64 18 96 %   05/08/25 1115 123/59 -- -- 70 17 100 %   05/08/25 1100 136/63 -- -- 63 16 100 %   05/08/25 1045 (!) 147/70 96.8  F (36  C) Temporal 71 16 100 %         PHYSICAL EXAM:  General: patient seen resting in bed, no acute distress  Resp: no respiratory distress, breathing comfortably on room air  Abdomen: Soft, moderately distended.  Tender in mid and bilateral lower abdomen with voluntary guarding.  Appropriately tender without peritoneal signs.  Left abdominal drain with small volume serosanguineous output, bulb holding suction.  Mid sagittal surgical incisions clean dry intact with overlying surgical dressing without evidence of saturation.  Output by Drain (mL) 05/07/25 0700 - 05/07/25 1459 05/07/25 1500 - 05/07/25 2259 05/07/25 2300 - 05/08/25 0659 05/08/25 0700 - 05/08/25 1459 05/08/25 1500 - 05/08/25 2259 05/08/25 2300 - 05/09/25 0659 05/09/25 0700 - 05/09/25 1000   Drain Closed/Suction 1 Medial LLQ Bulb 19 Malay    30 195 25       Extremities: warm and well perfused    05/08 0700 - 05/09 0659  In: 2281.67 [I.V.:2281.67]  Out: 2100 [Urine:1775; Drains:250]    No results displayed because visit has over 200 results.          TAMMY Sandhu [facility]  Surgery [address] [address]?  Office: [phone]

## 2025-05-09 NOTE — PLAN OF CARE
Problem: Pain Acute  Goal: Optimal Pain Control and Function  Intervention: Develop Pain Management Plan  Recent Flowsheet Documentation  Taken 5/9/2025 1255 by Laury Santos, RN  Pain Management Interventions: medication (see MAR)  Taken 5/9/2025 0909 by Laury Santos, RN  Pain Management Interventions:   declines   distraction   emotional support     Problem: Surgery Nonspecified  Goal: Effective Bowel Elimination  Outcome: Progressing     Problem: Surgery Nonspecified  Goal: Effective Urinary Elimination  Outcome: Progressing      Goal Outcome Evaluation:         A/O.   Surgical pain managed with scheduled tylenol and scheduled robaxin, no prn meds given this shift.    Tolerating clear liquid diet. Denies nausea/vomiting.   Passing gas. Active bowel sounds. Abdomen rounded and distended.   Surgical dressing clean, dry and intact.     BEKA with serosanguinous output.    White catheter removed this afternoon. Voiding after white removal, PVR 0 ml.  Had a smear BM on evening and wiped red blood from rectum, IWONA as toilet was full with toilet paper. Surgery PA aware.     Up at the edge of the bed. Hip brace in place. Ambulating in the hallway with assist x 1 and walker.

## 2025-05-09 NOTE — PLAN OF CARE
Problem: Adult Inpatient Plan of Care  Goal: Plan of Care Review  Description: The Plan of Care Review/Shift note should be completed every shift.  The Outcome Evaluation is a brief statement about your assessment that the patient is improving, declining, or no change.  This information will be displayed automatically on your shiftnote.  5/8/2025 2219 by Sandee Thomas RN  Outcome: Progressing  Flowsheets (Taken 5/8/2025 2219)  Plan of Care Reviewed With: patient  Overall Patient Progress: improving  5/8/2025 2218 by Sandee Thomas RN  Outcome: Progressing  Flowsheets (Taken 5/8/2025 2218)  Plan of Care Reviewed With: patient  Overall Patient Progress: improving     Problem: Pain Acute  Goal: Optimal Pain Control and Function  5/8/2025 2219 by Sandee Thomas RN  Outcome: Progressing  5/8/2025 2218 by Sandee Thomas RN  Outcome: Progressing  Intervention: Develop Pain Management Plan  Recent Flowsheet Documentation  Taken 5/8/2025 2101 by Sandee Thomas, RN  Pain Management Interventions: medication (see MAR)  Taken 5/8/2025 1900 by Sandee Thomas, RN  Pain Management Interventions: medication (see MAR)  Intervention: Prevent or Manage Pain  Recent Flowsheet Documentation  Taken 5/8/2025 1600 by Sandee Thomas RN  Sensory Stimulation Regulation: care clustered  Medication Review/Management: medications reviewed     Problem: Intestinal Obstruction  Goal: Optimal Bowel Function  Outcome: Progressing  Goal: Optimal Pain Control and Function  Outcome: Progressing  Intervention: Prevent or Manage Pain  Recent Flowsheet Documentation  Taken 5/8/2025 2101 by Sandee Thomas, RN  Pain Management Interventions: medication (see MAR)  Taken 5/8/2025 1900 by Sandee Thomas, RN  Pain Management Interventions: medication (see MAR)     Problem: Surgery Nonspecified  Goal: Absence of Bleeding  Outcome: Progressing  Goal: Optimal Pain Control and Function  Outcome: Progressing  Intervention: Prevent or Manage  Pain  Recent Flowsheet Documentation  Taken 5/8/2025 2101 by Sandee Thomas, RN  Pain Management Interventions: medication (see MAR)  Taken 5/8/2025 1900 by Sadnee Thomas, RN  Pain Management Interventions: medication (see MAR)  Goal: Effective Oxygenation and Ventilation  Outcome: Progressing  Intervention: Optimize Oxygenation and Ventilation  Recent Flowsheet Documentation  Taken 5/8/2025 1600 by Sandee Thomas, RN  Cough And Deep Breathing: done independently per patient  Activity Management:   activity adjusted per tolerance   activity encouraged   Goal Outcome Evaluation:      Plan of Care Reviewed With: patient    Overall Patient Progress: improvingOverall Patient Progress: improving     Pt had surgery today, says could not get up off bed, says her doctor said can rest and not wear the brace tonight. Pt says is ready for tomorrow. Was complaining of dizziness at 2000 vitals stable ate some jello x2 and lemon ice  and said feels better. Pain has been controled with pain medications and ice. Pt has one BEKA and draining well serosanguinous drain. Plan of care reviewed, pt says was scared after the block wears out, pt encouraged to call for pain meds and that we will be checking.  Updates written on the board for pain medications times.  was here visiting. Pt has not slept just snoozing for a little bit.

## 2025-05-10 ENCOUNTER — APPOINTMENT (OUTPATIENT)
Dept: PHYSICAL THERAPY | Facility: HOSPITAL | Age: 70
DRG: 853 | End: 2025-05-10
Payer: MEDICARE

## 2025-05-10 LAB
HCT VFR BLD AUTO: 21.7 % (ref 35–47)
HGB BLD-MCNC: 7 G/DL (ref 11.7–15.7)

## 2025-05-10 PROCEDURE — 97164 PT RE-EVAL EST PLAN CARE: CPT | Mod: GP

## 2025-05-10 PROCEDURE — 36415 COLL VENOUS BLD VENIPUNCTURE: CPT | Performed by: HOSPITALIST

## 2025-05-10 PROCEDURE — 250N000013 HC RX MED GY IP 250 OP 250 PS 637

## 2025-05-10 PROCEDURE — 250N000011 HC RX IP 250 OP 636: Mod: JZ

## 2025-05-10 PROCEDURE — 250N000013 HC RX MED GY IP 250 OP 250 PS 637: Performed by: HOSPITALIST

## 2025-05-10 PROCEDURE — 99233 SBSQ HOSP IP/OBS HIGH 50: CPT | Performed by: STUDENT IN AN ORGANIZED HEALTH CARE EDUCATION/TRAINING PROGRAM

## 2025-05-10 PROCEDURE — 97116 GAIT TRAINING THERAPY: CPT | Mod: GP

## 2025-05-10 PROCEDURE — 250N000011 HC RX IP 250 OP 636

## 2025-05-10 PROCEDURE — 120N000001 HC R&B MED SURG/OB

## 2025-05-10 PROCEDURE — 99232 SBSQ HOSP IP/OBS MODERATE 35: CPT | Performed by: HOSPITALIST

## 2025-05-10 PROCEDURE — 97530 THERAPEUTIC ACTIVITIES: CPT | Mod: GP

## 2025-05-10 PROCEDURE — 85014 HEMATOCRIT: CPT | Performed by: HOSPITALIST

## 2025-05-10 RX ADMIN — KETOROLAC TROMETHAMINE 15 MG: 15 INJECTION, SOLUTION INTRAMUSCULAR; INTRAVENOUS at 08:46

## 2025-05-10 RX ADMIN — ACETAMINOPHEN 1000 MG: 500 TABLET ORAL at 06:15

## 2025-05-10 RX ADMIN — KETOROLAC TROMETHAMINE 15 MG: 15 INJECTION, SOLUTION INTRAMUSCULAR; INTRAVENOUS at 02:57

## 2025-05-10 RX ADMIN — PANTOPRAZOLE SODIUM 40 MG: 40 TABLET, DELAYED RELEASE ORAL at 08:45

## 2025-05-10 RX ADMIN — FLUCONAZOLE 200 MG: 200 TABLET ORAL at 08:45

## 2025-05-10 RX ADMIN — KETOROLAC TROMETHAMINE 15 MG: 15 INJECTION, SOLUTION INTRAMUSCULAR; INTRAVENOUS at 14:36

## 2025-05-10 RX ADMIN — ACETAMINOPHEN 1000 MG: 500 TABLET ORAL at 12:50

## 2025-05-10 RX ADMIN — PIPERACILLIN AND TAZOBACTAM 3.38 G: 3; .375 INJECTION, POWDER, FOR SOLUTION INTRAVENOUS at 12:12

## 2025-05-10 RX ADMIN — PIPERACILLIN AND TAZOBACTAM 3.38 G: 3; .375 INJECTION, POWDER, FOR SOLUTION INTRAVENOUS at 18:22

## 2025-05-10 RX ADMIN — PIPERACILLIN AND TAZOBACTAM 3.38 G: 3; .375 INJECTION, POWDER, FOR SOLUTION INTRAVENOUS at 06:15

## 2025-05-10 RX ADMIN — KETOROLAC TROMETHAMINE 15 MG: 15 INJECTION, SOLUTION INTRAMUSCULAR; INTRAVENOUS at 20:52

## 2025-05-10 RX ADMIN — METHOCARBAMOL 500 MG: 500 TABLET ORAL at 08:44

## 2025-05-10 RX ADMIN — LEVOTHYROXINE SODIUM 75 MCG: 0.03 TABLET ORAL at 06:15

## 2025-05-10 RX ADMIN — ACETAMINOPHEN 1000 MG: 500 TABLET ORAL at 00:17

## 2025-05-10 RX ADMIN — ACETAMINOPHEN 1000 MG: 500 TABLET ORAL at 18:28

## 2025-05-10 RX ADMIN — METHOCARBAMOL 500 MG: 500 TABLET ORAL at 20:52

## 2025-05-10 RX ADMIN — METHOCARBAMOL 500 MG: 500 TABLET ORAL at 14:40

## 2025-05-10 RX ADMIN — PIPERACILLIN AND TAZOBACTAM 3.38 G: 3; .375 INJECTION, POWDER, FOR SOLUTION INTRAVENOUS at 00:17

## 2025-05-10 RX ADMIN — ATORVASTATIN CALCIUM 20 MG: 10 TABLET, FILM COATED ORAL at 20:52

## 2025-05-10 ASSESSMENT — ACTIVITIES OF DAILY LIVING (ADL)
ADLS_ACUITY_SCORE: 42
ADLS_ACUITY_SCORE: 41
ADLS_ACUITY_SCORE: 42
ADLS_ACUITY_SCORE: 41
ADLS_ACUITY_SCORE: 42
ADLS_ACUITY_SCORE: 41
ADLS_ACUITY_SCORE: 41
ADLS_ACUITY_SCORE: 42
ADLS_ACUITY_SCORE: 41
ADLS_ACUITY_SCORE: 41
ADLS_ACUITY_SCORE: 42

## 2025-05-10 NOTE — PROGRESS NOTES
General Surgery Progress Note:    Hospital Day # 13    ASSESSMENT:  1. Gluteal tendinitis of right buttock    2. Diverticulitis of large intestine with perforation without abscess, unspecified bleeding status        Michelle Isaac is a 70 year old female s/p laparoscopic lavage and drain placement 5/1 for perforated sigmoid diverticulitis, interval CT scan revealing pelvic fluid collection s/p IR drain placement with dark brown output and is s/p open sigmoidectomy on 05/08/2025.      PLAN:  -We will advance her to full liquid diet.  She completed treatment which she would like to either drink.  Otherwise I am not going to advance her beyond full liquid diet due to her continued abdominal distention.  -Activity as tolerated deferring to ortho recommendations  -PT/OT  -Continue ABX per ID  -CBC trend daily  -Scheduled tylenol, toradol and robaxin, PRN PO dilaudid  -Continue drain in place  -Surgery to follow     SUBJECTIVE:   Patient was seen at bedside today.  Patient states that she is afebrile.  No chest pain no shortness of breath.  Patient still very distended.  She is up and moving to the bathroom and voiding.  No significant return of bowel function.  Patient does state that she had a slightly bloody bowel movement but it has been subsiding.    Patient Vitals for the past 24 hrs:   BP Temp Temp src Pulse Resp SpO2   05/10/25 0755 123/60 98.6  F (37  C) Oral 75 18 93 %   05/10/25 0418 136/64 98  F (36.7  C) Oral 74 16 96 %   05/10/25 0107 132/63 98.6  F (37  C) Oral 74 16 96 %   05/09/25 1916 137/61 98.4  F (36.9  C) Oral 83 14 96 %   05/09/25 1523 118/58 98.5  F (36.9  C) Oral 71 14 96 %   05/09/25 1120 132/61 97.7  F (36.5  C) Oral 74 14 97 %         PHYSICAL EXAM:  General: patient seen resting in bed, no acute distress  Resp: no respiratory distress, breathing comfortably on room air  Abdomen: Soft, moderately distended.  Appropriate tenderness to palpation.  Abdominal drain 0 site.  Output by Drain (mL)  05/08/25 0700 - 05/08/25 1459 05/08/25 1500 - 05/08/25 2259 05/08/25 2300 - 05/09/25 0659 05/09/25 0700 - 05/09/25 1459 05/09/25 1500 - 05/09/25 2259 05/09/25 2300 - 05/10/25 0659 05/10/25 0700 - 05/10/25 1008   Drain Closed/Suction 1 Medial LLQ Bulb 19 Persian 30 195 25 40 10 10 25      Extremities: warm and well perfused    05/09 0700 - 05/10 0659  In: 1156.67 [P.O.:600; I.V.:556.67]  Out: 2060 [Urine:2000; Drains:60]    No results displayed because visit has over 200 results.      Lane Faye DO  General Surgeon  [facility]  Surgery [facility] - [address]  [place] [address]?  Office: [phone]  Employed by - [employer]  Pager: [phone]

## 2025-05-10 NOTE — PROGRESS NOTES
Occupational Therapy: Orders received. Chart reviewed and discussed with care team/PT. Occupational Therapy not indicated due to patient being at/near baseline for ADL's and transfers/mobility. Patient has all necessary DME. No additional OT needs identified. Defer discharge recommendations to PT. Will complete orders.     Curtis Crow, OTR   May 10, 2025

## 2025-05-10 NOTE — PLAN OF CARE
Problem: Adult Inpatient Plan of Care  Goal: Optimal Comfort and Wellbeing  Intervention: Monitor Pain and Promote Comfort  Recent Flowsheet Documentation  Taken 5/10/2025 1250 by Laury Santos, RN  Pain Management Interventions: medication (see MAR)  Taken 5/10/2025 0841 by Laury Santos, RN  Pain Management Interventions: medication (see MAR)     Problem: Surgery Nonspecified  Goal: Effective Bowel Elimination  Outcome: Progressing     Problem: Surgery Nonspecified  Goal: Effective Urinary Elimination  Outcome: Progressing   Goal Outcome Evaluation:         A/O.   C/o mild surgical abdominal pain that worsens with activity. Pain control with scheduled pain medications.   Denies nausea/emesis.     Diet advanced to Full liquid diet.   Passing gas. Had one medium loose stool, no blood noted.   Up to the commode independently, voiding good amount of UO.   Hip Brace in place. Working with PT today.     Surgical dressing C/D/I. BEKA drain with serosanguinous output.

## 2025-05-10 NOTE — PLAN OF CARE
Problem: Adult Inpatient Plan of Care  Goal: Optimal Comfort and Wellbeing  Outcome: Progressing     Problem: Delirium  Goal: Improved Sleep  Outcome: Progressing     Problem: Pain Acute  Goal: Optimal Pain Control and Function  Outcome: Progressing     Problem: Intestinal Obstruction  Goal: Optimal Pain Control and Function  Outcome: Progressing     Problem: Surgery Nonspecified  Goal: Absence of Bleeding  Outcome: Progressing  Goal: Effective Urinary Elimination  Outcome: Progressing   Goal Outcome Evaluation:       Pt is A/O x4. C/O surgical pain. Schedule APAP, Robaxin and Toradol administer and somewhat effective. Pt voiding good. BEKA patent with 10 ml output. VSS, continue to monitor. Serenity Villafana RN

## 2025-05-10 NOTE — PROGRESS NOTES
"[facility] Progress Note    ASSESSMENT:  Peritonitis, post washout (first non open, yesterday open) with operative note finding abscess(es)  New abscess now post op, manifested in the classic way with rising white count while on abx.  Newest abscess with pseudomonas  Abscesses found may 8th.  Status post sigmoid colectomy.  No new cultures collected.    RECOMMENDATION:  Likely to need minimum 2 wks augmentin and fluc and Levaquin (growth of Pseudomonas) at Discharge date which maybe will be Monday, we will see that day.      Jac Sanchez MD        SUBJECTIVE:   New to me today.  Cultures reviewed.  On IV Zosyn.    REVIEW OF SYSTEMS:  Negative unless as listed above.  Social history, Family history, Medications: reviewed.    OBJECTIVE:  BP (!) 146/66 (BP Location: Left arm, Patient Position: Semi-Gauthier's, Cuff Size: Adult Regular)   Pulse 78   Temp 98.4  F (36.9  C) (Oral)   Resp 18   Wt 78.3 kg (172 lb 9.6 oz)   SpO2 97%   BMI 29.17 kg/m                PHYSICAL EXAM:  Alert, awake  Vitals tabulated above, reviewed  Neck supple without lymphadenopathy  Sclera normal color, not injected  CARDIOVASCULAR regular rate and rhythm, no murmur  Lungs CLEAR TO AUSCULTATION   Abdomen soft  Midline gut wound  Skin normal  Joints normal  Neurologic exam non focal    Antibiotics:z/fluc    Pertinent labs:    Recent Labs   Lab Test 05/10/25  0608 05/09/25  0627 05/08/25  0557 05/07/25  0541   WBC  --  13.9* 10.5 12.6*   HGB 7.0* 7.9* 9.3* 9.3*   HCT 21.7* 24.6* 28.7* 28.5*   MCV  --  88 87 87   PLT  --  607* 530* 498*       Lab Results   Component Value Date    RBC 3.47 05/02/2025     Lab Results   Component Value Date    HGB 10.0 05/02/2025    HGB 10.0 05/02/2025     Lab Results   Component Value Date    HCT 30.7 05/02/2025     No components found for: \"MCT\"  Lab Results   Component Value Date    MCV 89 05/02/2025     Lab Results   Component Value Date    MCH 28.8 05/02/2025     Lab Results " "  Component Value Date    MCHC 32.6 05/02/2025     Lab Results   Component Value Date    RDW 13.0 05/02/2025     Lab Results   Component Value Date     05/02/2025       Last Comprehensive Metabolic Panel:  Sodium   Date Value Ref Range Status   05/09/2025 135 135 - 145 mmol/L Final     Potassium   Date Value Ref Range Status   05/09/2025 4.3 3.4 - 5.3 mmol/L Final     Chloride   Date Value Ref Range Status   05/09/2025 99 98 - 107 mmol/L Final     Carbon Dioxide (CO2)   Date Value Ref Range Status   05/09/2025 26 22 - 29 mmol/L Final     Anion Gap   Date Value Ref Range Status   05/09/2025 10 7 - 15 mmol/L Final     Glucose   Date Value Ref Range Status   05/09/2025 118 (H) 70 - 99 mg/dL Final     GLUCOSE BY METER POCT   Date Value Ref Range Status   05/08/2025 161 (H) 70 - 99 mg/dL Final     Urea Nitrogen   Date Value Ref Range Status   05/09/2025 7.7 (L) 8.0 - 23.0 mg/dL Final     Creatinine   Date Value Ref Range Status   05/09/2025 0.79 0.51 - 0.95 mg/dL Final     GFR Estimate   Date Value Ref Range Status   05/09/2025 79 >60 mL/min/1.73m2 Final     Comment:     eGFR calculated using 2021 CKD-EPI equation.     Calcium   Date Value Ref Range Status   05/09/2025 8.7 (L) 8.8 - 10.4 mg/dL Final       Liver Function Studies -   Recent Labs   Lab Test 05/01/25  0713   PROTTOTAL 6.6   ALBUMIN 3.5   BILITOTAL 0.4   ALKPHOS 350*   AST 23   ALT 65*       No results found for: \"SED\"    No results found for: \"CRP\"            MICROBIOLOGY DATA:  PA and candida lusitanae      IMAGING/RADIOLOGY:                                                                       1.  New left pelvic abscess measuring 9.9 x 4.9 cm.  2.  Decreased in size posterior pelvic abscess (2.1 x 6.9 cm, previously 4 x 7.8 cm).  3.  Slight improved sigmoid colonic wall thickening with persistent small to moderate volume pneumoperitoneum.    "

## 2025-05-10 NOTE — PROGRESS NOTES
"[facility]    Medicine Progress Note - Hospitalist Service    Date of Admission:  4/27/2025    Assessment & Plan   70-year-old female with recent tendon surgery to RLE presented with abdominal pain and found to have perforated diverticulitis.    #Sigmoid colon diverticulitis with perforation and abscess  #Sepsis  Laparoscopic washout with drain placement 5/1  Persistent leukocytosis prompted repeat CT showing new abscess  IR placed second drain 5/4  s/p open sigmoid colectomy, drainage of intra-abdominal abscess 5/8  Pain and nausea control  ID following: fluconazole and zosyn  GS advanced to full liquid, but no further 2/2 abd distension    #Acute blood loss anemia  Monitor and transfuse for Hgb < 7    #Recent RLE tendon repair and trochanteric bursectomy, 4/22  Ortho consulted, not concerned for abscess  Activity per ortho: Brace at all times, flatfoot weightbearing RLE, crutches when ambulating, no active hip abduction or internal rotation, no passive hip external rotation, abduction  PT, OT      DVT PPx: PCDs          Diet: Diet  Snacks/Supplements Adult: Gelatein Plus; Between Meals  Full Liquid Diet    Luz Catheter: Not present  Lines: None     Cardiac Monitoring: None  Code Status: Full Code      Clinically Significant Risk Factors               # Hypoalbuminemia: Lowest albumin = 3.3 g/dL at 4/28/2025  6:14 AM, will monitor as appropriate                # Overweight: Estimated body mass index is 29.17 kg/m  as calculated from the following:    Height as of 4/22/25: 1.638 m (5' 4.5\").    Weight as of this encounter: 78.3 kg (172 lb 9.6 oz).             Social Drivers of Health    Housing Stability: High Risk (4/28/2025)    Housing Stability     Do you have housing? : No     Are you worried about losing your housing?: No   Physical Activity: Insufficiently Active (3/16/2024)    Received from [facility]    Exercise Vital Sign     Days of Exercise per Week: 3 days     Minutes of " Exercise per Session: 30 min          Disposition Plan     Medically Ready for Discharge: Anticipated in 2-4 Days             Chava Henao DO  Hospitalist Service  [facility]  Securely message with [employer] (more info)  Text page via [employer] Paging/Directory   ______________________________________________________________________    Interval History   Passing flatus, continued abd bloating    Physical Exam   Vital Signs: Temp: 98.6  F (37  C) Temp src: Oral BP: 123/60 Pulse: 75   Resp: 18 SpO2: 93 % O2 Device: None (Room air)    Weight: 172 lbs 9.6 oz    General Appearance:  No acute distress  Respiratory: Nonlabored breathing  GI: Normal bowel sounds, abdomen is soft, distended with no rebound      Medical Decision Making             Data

## 2025-05-11 ENCOUNTER — APPOINTMENT (OUTPATIENT)
Dept: PHYSICAL THERAPY | Facility: HOSPITAL | Age: 70
DRG: 853 | End: 2025-05-11
Payer: MEDICARE

## 2025-05-11 LAB
ANION GAP SERPL CALCULATED.3IONS-SCNC: 7 MMOL/L (ref 7–15)
BACTERIA ABSC ANAEROBE+AEROBE CULT: NORMAL
BASOPHILS # BLD AUTO: 0.1 10E3/UL (ref 0–0.2)
BASOPHILS NFR BLD AUTO: 1 %
BUN SERPL-MCNC: 6.7 MG/DL (ref 8–23)
CALCIUM SERPL-MCNC: 8.2 MG/DL (ref 8.8–10.4)
CHLORIDE SERPL-SCNC: 102 MMOL/L (ref 98–107)
CREAT SERPL-MCNC: 0.73 MG/DL (ref 0.51–0.95)
EGFRCR SERPLBLD CKD-EPI 2021: 87 ML/MIN/1.73M2
EOSINOPHIL # BLD AUTO: 0.7 10E3/UL (ref 0–0.7)
EOSINOPHIL NFR BLD AUTO: 7 %
ERYTHROCYTE [DISTWIDTH] IN BLOOD BY AUTOMATED COUNT: 13.6 % (ref 10–15)
GLUCOSE SERPL-MCNC: 114 MG/DL (ref 70–99)
HCO3 SERPL-SCNC: 28 MMOL/L (ref 22–29)
HCT VFR BLD AUTO: 22.6 % (ref 35–47)
HGB BLD-MCNC: 7.1 G/DL (ref 11.7–15.7)
IMM GRANULOCYTES # BLD: 0.2 10E3/UL
IMM GRANULOCYTES NFR BLD: 2 %
LYMPHOCYTES # BLD AUTO: 1.7 10E3/UL (ref 0.8–5.3)
LYMPHOCYTES NFR BLD AUTO: 16 %
MAGNESIUM SERPL-MCNC: 1.7 MG/DL (ref 1.7–2.3)
MCH RBC QN AUTO: 28.2 PG (ref 26.5–33)
MCHC RBC AUTO-ENTMCNC: 31.4 G/DL (ref 31.5–36.5)
MCV RBC AUTO: 90 FL (ref 78–100)
MONOCYTES # BLD AUTO: 0.7 10E3/UL (ref 0–1.3)
MONOCYTES NFR BLD AUTO: 7 %
NEUTROPHILS # BLD AUTO: 7.3 10E3/UL (ref 1.6–8.3)
NEUTROPHILS NFR BLD AUTO: 69 %
NRBC # BLD AUTO: 0 10E3/UL
NRBC BLD AUTO-RTO: 0 /100
PLATELET # BLD AUTO: 562 10E3/UL (ref 150–450)
POTASSIUM SERPL-SCNC: 3.1 MMOL/L (ref 3.4–5.3)
RBC # BLD AUTO: 2.52 10E6/UL (ref 3.8–5.2)
SODIUM SERPL-SCNC: 137 MMOL/L (ref 135–145)
WBC # BLD AUTO: 10.7 10E3/UL (ref 4–11)

## 2025-05-11 PROCEDURE — 80048 BASIC METABOLIC PNL TOTAL CA: CPT | Performed by: HOSPITALIST

## 2025-05-11 PROCEDURE — 250N000013 HC RX MED GY IP 250 OP 250 PS 637

## 2025-05-11 PROCEDURE — 99233 SBSQ HOSP IP/OBS HIGH 50: CPT | Performed by: HOSPITALIST

## 2025-05-11 PROCEDURE — 250N000011 HC RX IP 250 OP 636

## 2025-05-11 PROCEDURE — 36415 COLL VENOUS BLD VENIPUNCTURE: CPT | Performed by: HOSPITALIST

## 2025-05-11 PROCEDURE — 120N000001 HC R&B MED SURG/OB

## 2025-05-11 PROCEDURE — 250N000013 HC RX MED GY IP 250 OP 250 PS 637: Performed by: HOSPITALIST

## 2025-05-11 PROCEDURE — 97110 THERAPEUTIC EXERCISES: CPT | Mod: GP

## 2025-05-11 PROCEDURE — 99024 POSTOP FOLLOW-UP VISIT: CPT

## 2025-05-11 PROCEDURE — 85025 COMPLETE CBC W/AUTO DIFF WBC: CPT | Performed by: HOSPITALIST

## 2025-05-11 PROCEDURE — 83735 ASSAY OF MAGNESIUM: CPT | Performed by: HOSPITALIST

## 2025-05-11 PROCEDURE — 250N000011 HC RX IP 250 OP 636: Mod: JZ

## 2025-05-11 RX ORDER — IBUPROFEN 400 MG/1
400 TABLET, FILM COATED ORAL EVERY 6 HOURS PRN
Status: DISCONTINUED | OUTPATIENT
Start: 2025-05-11 | End: 2025-05-12 | Stop reason: HOSPADM

## 2025-05-11 RX ORDER — KETOROLAC TROMETHAMINE 15 MG/ML
15 INJECTION, SOLUTION INTRAMUSCULAR; INTRAVENOUS EVERY 6 HOURS PRN
Status: DISCONTINUED | OUTPATIENT
Start: 2025-05-11 | End: 2025-05-12 | Stop reason: HOSPADM

## 2025-05-11 RX ORDER — POTASSIUM CHLORIDE 1500 MG/1
60 TABLET, EXTENDED RELEASE ORAL ONCE
Status: COMPLETED | OUTPATIENT
Start: 2025-05-11 | End: 2025-05-11

## 2025-05-11 RX ORDER — MAGNESIUM OXIDE 400 MG/1
400 TABLET ORAL 2 TIMES DAILY
Status: DISCONTINUED | OUTPATIENT
Start: 2025-05-11 | End: 2025-05-12 | Stop reason: HOSPADM

## 2025-05-11 RX ADMIN — PIPERACILLIN AND TAZOBACTAM 3.38 G: 3; .375 INJECTION, POWDER, FOR SOLUTION INTRAVENOUS at 18:12

## 2025-05-11 RX ADMIN — MAGNESIUM OXIDE TAB 400 MG (241.3 MG ELEMENTAL MG) 400 MG: 400 (241.3 MG) TAB at 21:18

## 2025-05-11 RX ADMIN — FLUCONAZOLE 200 MG: 200 TABLET ORAL at 09:35

## 2025-05-11 RX ADMIN — MAGNESIUM OXIDE TAB 400 MG (241.3 MG ELEMENTAL MG) 400 MG: 400 (241.3 MG) TAB at 12:15

## 2025-05-11 RX ADMIN — KETOROLAC TROMETHAMINE 15 MG: 15 INJECTION, SOLUTION INTRAMUSCULAR; INTRAVENOUS at 02:59

## 2025-05-11 RX ADMIN — PIPERACILLIN AND TAZOBACTAM 3.38 G: 3; .375 INJECTION, POWDER, FOR SOLUTION INTRAVENOUS at 12:24

## 2025-05-11 RX ADMIN — PIPERACILLIN AND TAZOBACTAM 3.38 G: 3; .375 INJECTION, POWDER, FOR SOLUTION INTRAVENOUS at 06:21

## 2025-05-11 RX ADMIN — KETOROLAC TROMETHAMINE 15 MG: 15 INJECTION, SOLUTION INTRAMUSCULAR; INTRAVENOUS at 09:27

## 2025-05-11 RX ADMIN — ACETAMINOPHEN 1000 MG: 500 TABLET ORAL at 13:02

## 2025-05-11 RX ADMIN — METHOCARBAMOL 500 MG: 500 TABLET ORAL at 15:07

## 2025-05-11 RX ADMIN — ACETAMINOPHEN 500 MG: 500 TABLET ORAL at 06:20

## 2025-05-11 RX ADMIN — PANTOPRAZOLE SODIUM 40 MG: 40 TABLET, DELAYED RELEASE ORAL at 06:21

## 2025-05-11 RX ADMIN — ACETAMINOPHEN 1000 MG: 500 TABLET ORAL at 00:18

## 2025-05-11 RX ADMIN — POTASSIUM CHLORIDE 60 MEQ: 1500 TABLET, EXTENDED RELEASE ORAL at 09:35

## 2025-05-11 RX ADMIN — ACETAMINOPHEN 1000 MG: 500 TABLET ORAL at 18:16

## 2025-05-11 RX ADMIN — PIPERACILLIN AND TAZOBACTAM 3.38 G: 3; .375 INJECTION, POWDER, FOR SOLUTION INTRAVENOUS at 00:18

## 2025-05-11 RX ADMIN — ATORVASTATIN CALCIUM 20 MG: 10 TABLET, FILM COATED ORAL at 21:18

## 2025-05-11 RX ADMIN — METHOCARBAMOL 500 MG: 500 TABLET ORAL at 09:35

## 2025-05-11 RX ADMIN — METHOCARBAMOL 500 MG: 500 TABLET ORAL at 21:19

## 2025-05-11 RX ADMIN — LEVOTHYROXINE SODIUM 75 MCG: 0.03 TABLET ORAL at 06:21

## 2025-05-11 ASSESSMENT — ACTIVITIES OF DAILY LIVING (ADL)
ADLS_ACUITY_SCORE: 42
ADLS_ACUITY_SCORE: 42
ADLS_ACUITY_SCORE: 46
ADLS_ACUITY_SCORE: 42
ADLS_ACUITY_SCORE: 46

## 2025-05-11 NOTE — PLAN OF CARE
Problem: Pain Acute  Goal: Optimal Pain Control and Function  Intervention: Develop Pain Management Plan  Recent Flowsheet Documentation  Taken 5/11/2025 1509 by Laury Santos, RN  Pain Management Interventions: medication (see MAR)  Taken 5/11/2025 1300 by Laury Santos, RN  Pain Management Interventions: medication (see MAR)  Taken 5/11/2025 0927 by Laury Santos, RN  Pain Management Interventions: medication (see MAR)  Taken 5/11/2025 0900 by Laury Santos, RN  Pain Management Interventions: medication (see MAR)     Problem: Intestinal Obstruction  Goal: Optimal Bowel Function  Outcome: Progressing   Goal Outcome Evaluation:         A/O.   C/o dull abdominal pain and headache managed with scheduled pain medications.     Denies nausea. Tolerating full liquid diet in the morning.   Diet advanced to low fiber.   Patient did not tolerate protein shake well in the evening, as it made her feel more bloated and distended and have more cramping. Encouraged to go slow with rest of the diet tonight.     Up to the commode and the bathroom with SBA, walker and Hip brace in place.   Voiding good amount of UO and having some BM as well.     BEKA with serosanguinous output. Surgical dressing C/D/I.

## 2025-05-11 NOTE — PLAN OF CARE
"  Problem: Adult Inpatient Plan of Care  Goal: Optimal Comfort and Wellbeing  Outcome: Progressing  Intervention: Monitor Pain and Promote Comfort  Recent Flowsheet Documentation  Taken 5/11/2025 0020 by Alfonso Plaza RN  Pain Management Interventions: medication (see MAR)  Taken 5/10/2025 2050 by Alfonso Plaza RN  Pain Management Interventions: medication (see MAR)     Problem: Delirium  Goal: Improved Attention and Thought Clarity  Outcome: Progressing     Problem: Pain Acute  Goal: Optimal Pain Control and Function  Outcome: Progressing  Intervention: Develop Pain Management Plan  Recent Flowsheet Documentation  Taken 5/11/2025 0020 by Alfonso Plaza RN  Pain Management Interventions: medication (see MAR)  Taken 5/10/2025 2050 by Alfonso Plaza RN  Pain Management Interventions: medication (see MAR)  Intervention: Prevent or Manage Pain  Recent Flowsheet Documentation  Taken 5/11/2025 0020 by Alfonso Plaza RN  Medication Review/Management: medications reviewed  Taken 5/10/2025 2050 by Alfonso Plaza RN  Medication Review/Management: medications reviewed     Problem: Intestinal Obstruction  Goal: Optimal Bowel Function  Outcome: Progressing  Intervention: Promote Bowel Function  Recent Flowsheet Documentation  Taken 5/11/2025 0357 by Alfonso Plaza RN  Body Position: position changed independently  Taken 5/11/2025 0020 by Alfonso Plaza RN  Body Position: position changed independently  Head of Bed (HOB) Positioning: HOB at 20-30 degrees  Taken 5/10/2025 2050 by Alfonso Plaza RN  Body Position: position changed independently  Head of Bed (HOB) Positioning: HOB at 20-30 degrees     Problem: Surgery Nonspecified  Goal: Effective Urinary Elimination  Outcome: Progressing   Goal Outcome Evaluation:    A/Ox4. VSS.    Reporting mild abdominal pain and headache. Also reporting worse pain when urinating, describes as \"bladder pain\" but denies sensation of spasms. Pain controlled " with scheduled tylenol and toradol.     Voiding frequently and having small bowel movement. Some bowel movements appear to have small dark red/black specks. Had one large brown BM overnight.     Hip brace in place at all times.     BEKA patent with serosanguinous output. Dressings C/D/I.           Alfonso Plaza RN

## 2025-05-11 NOTE — PROGRESS NOTES
"[facility]    Medicine Progress Note - Hospitalist Service    Date of Admission:  4/27/2025    Assessment & Plan   70-year-old female with recent tendon surgery to RLE presented with abdominal pain and found to have perforated diverticulitis.    #Sigmoid colon diverticulitis with perforation and abscess  #Sepsis  Laparoscopic washout with drain placement 5/1  Persistent leukocytosis prompted repeat CT showing new abscess  IR placed second drain 5/4  s/p open sigmoid colectomy, drainage of intra-abdominal abscess 5/8  Pain and nausea control  ID following: fluconazole and zosyn, planning for 2w Augmentin and Diflucan at DC  GS advanced to full liquid, but no further 2/2 abd distension for now    #Acute blood loss anemia  Monitor and transfuse for Hgb < 7    #Recent RLE tendon repair and trochanteric bursectomy, 4/22  Ortho consulted, not concerned for abscess  Activity per ortho: Brace at all times, flatfoot weightbearing RLE, crutches when ambulating, no active hip abduction or internal rotation, no passive hip external rotation, abduction  PT, OT      DVT PPx: PCDs          Diet: Diet  Snacks/Supplements Adult: Gelatein Plus; Between Meals  Full Liquid Diet  Snacks/Supplements Adult: Ensure Enlive; With Meals    Luz Catheter: Not present  Lines: None     Cardiac Monitoring: None  Code Status: Full Code      Clinically Significant Risk Factors        # Hypokalemia: Lowest K = 3.1 mmol/L in last 2 days, will replace as needed        # Hypoalbuminemia: Lowest albumin = 3.3 g/dL at 4/28/2025  6:14 AM, will monitor as appropriate                # Overweight: Estimated body mass index is 29.17 kg/m  as calculated from the following:    Height as of 4/22/25: 1.638 m (5' 4.5\").    Weight as of this encounter: 78.3 kg (172 lb 9.6 oz).             Social Drivers of Health    Housing Stability: High Risk (4/28/2025)    Housing Stability     Do you have housing? : No     Are you worried about losing " your housing?: No   Physical Activity: Insufficiently Active (3/16/2024)    Received from [facility]    Exercise Vital Sign     Days of Exercise per Week: 3 days     Minutes of Exercise per Session: 30 min          Disposition Plan     Medically Ready for Discharge: Anticipated Tomorrow             Chava Henao, DO  Hospitalist Service  [facility]  Securely message with [employer] (more info)  Text page via [employer] Paging/Directory   ______________________________________________________________________    Interval History   Had large loose BM ~3am, less bloating    Physical Exam   Vital Signs: Temp: 98  F (36.7  C) Temp src: Oral BP: 128/64 Pulse: 77   Resp: 17 SpO2: 97 % O2 Device: None (Room air)    Weight: 172 lbs 9.6 oz    General Appearance:  No acute distress  Respiratory: Clear to auscultation bilaterally  Cardiovascular: Regular rate and rhythm  GI: Normal bowel sounds, abdomen is soft, less distended with no rebound    Medical Decision Making       50 MINUTES SPENT BY ME on the date of service doing chart review, history, exam, documentation & further activities per the note.      Data

## 2025-05-12 ENCOUNTER — APPOINTMENT (OUTPATIENT)
Dept: PHYSICAL THERAPY | Facility: HOSPITAL | Age: 70
DRG: 853 | End: 2025-05-12
Payer: MEDICARE

## 2025-05-12 VITALS
WEIGHT: 172.6 LBS | HEART RATE: 86 BPM | TEMPERATURE: 98.4 F | BODY MASS INDEX: 29.17 KG/M2 | OXYGEN SATURATION: 97 % | RESPIRATION RATE: 18 BRPM | DIASTOLIC BLOOD PRESSURE: 63 MMHG | SYSTOLIC BLOOD PRESSURE: 124 MMHG

## 2025-05-12 LAB — POTASSIUM SERPL-SCNC: 3.6 MMOL/L (ref 3.4–5.3)

## 2025-05-12 PROCEDURE — 84132 ASSAY OF SERUM POTASSIUM: CPT | Performed by: HOSPITALIST

## 2025-05-12 PROCEDURE — 250N000013 HC RX MED GY IP 250 OP 250 PS 637

## 2025-05-12 PROCEDURE — 36415 COLL VENOUS BLD VENIPUNCTURE: CPT | Performed by: HOSPITALIST

## 2025-05-12 PROCEDURE — 99207 PR NO CHARGE LOS: CPT | Performed by: INTERNAL MEDICINE

## 2025-05-12 PROCEDURE — 99239 HOSP IP/OBS DSCHRG MGMT >30: CPT | Performed by: HOSPITALIST

## 2025-05-12 PROCEDURE — 97530 THERAPEUTIC ACTIVITIES: CPT | Mod: GP

## 2025-05-12 PROCEDURE — 250N000013 HC RX MED GY IP 250 OP 250 PS 637: Performed by: HOSPITALIST

## 2025-05-12 PROCEDURE — 250N000011 HC RX IP 250 OP 636

## 2025-05-12 RX ORDER — FLUCONAZOLE 200 MG/1
200 TABLET ORAL DAILY
Qty: 14 TABLET | Refills: 0 | Status: SHIPPED | OUTPATIENT
Start: 2025-05-13 | End: 2025-05-27

## 2025-05-12 RX ORDER — DIPHENHYDRAMINE HCL 25 MG
25 CAPSULE ORAL ONCE
Status: COMPLETED | OUTPATIENT
Start: 2025-05-12 | End: 2025-05-12

## 2025-05-12 RX ORDER — POLYETHYLENE GLYCOL 3350 17 G/17G
17 POWDER, FOR SOLUTION ORAL DAILY
COMMUNITY
Start: 2025-05-12

## 2025-05-12 RX ORDER — IBUPROFEN 400 MG/1
400 TABLET, FILM COATED ORAL EVERY 6 HOURS PRN
Status: SHIPPED
Start: 2025-05-12

## 2025-05-12 RX ORDER — SIMETHICONE 80 MG
80 TABLET,CHEWABLE ORAL EVERY 6 HOURS PRN
COMMUNITY
Start: 2025-05-12

## 2025-05-12 RX ORDER — ACETAMINOPHEN 500 MG
500-1000 TABLET ORAL EVERY 8 HOURS
Status: DISCONTINUED | OUTPATIENT
Start: 2025-05-12 | End: 2025-05-12 | Stop reason: HOSPADM

## 2025-05-12 RX ORDER — LEVOFLOXACIN 500 MG/1
500 TABLET, FILM COATED ORAL EVERY 24 HOURS
Qty: 14 TABLET | Refills: 0 | Status: SHIPPED | OUTPATIENT
Start: 2025-05-12 | End: 2025-05-26

## 2025-05-12 RX ORDER — LEVOFLOXACIN 500 MG/1
500 TABLET, FILM COATED ORAL EVERY 24 HOURS
Status: DISCONTINUED | OUTPATIENT
Start: 2025-05-12 | End: 2025-05-12 | Stop reason: HOSPADM

## 2025-05-12 RX ORDER — METHOCARBAMOL 500 MG/1
500 TABLET, FILM COATED ORAL 3 TIMES DAILY
Qty: 90 TABLET | Refills: 0 | Status: SHIPPED | OUTPATIENT
Start: 2025-05-12

## 2025-05-12 RX ADMIN — PIPERACILLIN AND TAZOBACTAM 3.38 G: 3; .375 INJECTION, POWDER, FOR SOLUTION INTRAVENOUS at 06:02

## 2025-05-12 RX ADMIN — METHOCARBAMOL 500 MG: 500 TABLET ORAL at 13:52

## 2025-05-12 RX ADMIN — METHOCARBAMOL 500 MG: 500 TABLET ORAL at 08:10

## 2025-05-12 RX ADMIN — IBUPROFEN 400 MG: 400 TABLET, FILM COATED ORAL at 08:09

## 2025-05-12 RX ADMIN — LEVOFLOXACIN 500 MG: 500 TABLET, FILM COATED ORAL at 16:30

## 2025-05-12 RX ADMIN — MAGNESIUM OXIDE TAB 400 MG (241.3 MG ELEMENTAL MG) 400 MG: 400 (241.3 MG) TAB at 08:10

## 2025-05-12 RX ADMIN — LEVOTHYROXINE SODIUM 75 MCG: 0.03 TABLET ORAL at 06:02

## 2025-05-12 RX ADMIN — ACETAMINOPHEN 1000 MG: 500 TABLET ORAL at 06:02

## 2025-05-12 RX ADMIN — FLUCONAZOLE 200 MG: 200 TABLET ORAL at 08:10

## 2025-05-12 RX ADMIN — ACETAMINOPHEN 1000 MG: 500 TABLET ORAL at 13:56

## 2025-05-12 RX ADMIN — PIPERACILLIN AND TAZOBACTAM 3.38 G: 3; .375 INJECTION, POWDER, FOR SOLUTION INTRAVENOUS at 00:16

## 2025-05-12 RX ADMIN — PIPERACILLIN AND TAZOBACTAM 3.38 G: 3; .375 INJECTION, POWDER, FOR SOLUTION INTRAVENOUS at 12:10

## 2025-05-12 RX ADMIN — ACETAMINOPHEN 1000 MG: 500 TABLET ORAL at 00:16

## 2025-05-12 RX ADMIN — DIPHENHYDRAMINE HYDROCHLORIDE 25 MG: 25 CAPSULE ORAL at 15:54

## 2025-05-12 RX ADMIN — PANTOPRAZOLE SODIUM 40 MG: 40 TABLET, DELAYED RELEASE ORAL at 06:17

## 2025-05-12 ASSESSMENT — ACTIVITIES OF DAILY LIVING (ADL)
ADLS_ACUITY_SCORE: 45
ADLS_ACUITY_SCORE: 45
ADLS_ACUITY_SCORE: 46
ADLS_ACUITY_SCORE: 45
ADLS_ACUITY_SCORE: 41
ADLS_ACUITY_SCORE: 46
ADLS_ACUITY_SCORE: 46
ADLS_ACUITY_SCORE: 41
ADLS_ACUITY_SCORE: 46
ADLS_ACUITY_SCORE: 41
ADLS_ACUITY_SCORE: 46
ADLS_ACUITY_SCORE: 41
ADLS_ACUITY_SCORE: 46
ADLS_ACUITY_SCORE: 45

## 2025-05-12 NOTE — PROGRESS NOTES
CLINICAL NUTRITION SERVICES - REASSESSMENT NOTE     RECOMMENDATIONS FOR MDs/PROVIDERS TO ORDER:    Registered Dietitian Interventions:  Discontinue all supplements  Nutrition education for diet after discharge continued    Future/Additional Recommendations:  F/U PO, GI     INFORMATION OBTAINED  Ensure shakes were reordered for pt over wknd, she reports not tolerating these: they cause bloating and seem to push toward nausea.  Does not favor protein gelatein.  Pt had more questions RE how to best execute adequate protein diet at home.    CURRENT NUTRITION ORDERS  Diet: LowFi    CURRENT INTAKE/TOLERANCE  PO intakes significantly reduced since surgery 5/8, not tolerating or favoring supplements  BRK this AM: PO significantly improved from prior days     NEW FINDINGS  GI symptoms: Reviewed    Skin/wounds: Reviewed    Nutrition-relevant labs: Reviewed  Nutrition-relevant medications: Reviewed  Weight:  Date/Time Weight Weight Method   05/05/25 1052 78.3 kg (172 lb 9.6 oz) Standing scale   04/28/25 0015 76.2 kg (167 lb 15.9 oz) Bed scale       ASSESSED NUTRITION NEEDS  Dosing Weight: 76.2 kg (prior) actual wt for kcal (not sig change), 55.7 kg IBW for protein   Estimated Energy Needs: 1550+ kcals/day ([place] Jeor x1.2+)  Justification: Increased needs  Estimated Protein Needs: 55-67+ grams protein/day (1 - 1.2+ grams of pro/kg)  Justification: Increased needs  Estimated Fluid Needs: 1550+ mL/day (1 mL/kcal)  Justification: Maintenance    MALNUTRITION  Malnutrition Diagnosis: Patient does not meet two of the established criteria necessary for diagnosing malnutrition  Malnutrition Present on Admission: No    EVALUATION OF THE PROGRESS TOWARD GOALS   Previous Goals  Electrolytes WNL   Evaluation: Progressing    Patient to consume % of nutritionally adequate meal trays TID, or the equivalent with supplements/snacks.   Evaluation: Unable to meet    Previous Nutrition Diagnosis  Altered gastrointestinal (GI) function  related to diverticulitis as evidenced by need for low fiber diet and education.   Evaluation: No change    NUTRITION DIAGNOSIS  Altered gastrointestinal (GI) function related to diverticulitis as evidenced by need for low fiber diet and education.     INTERVENTIONS  Discontinue all supplements  Nutrition education for diet after discharge continued    GOALS  Patient to consume % of nutritionally adequate meal trays TID, or the equivalent with supplements/snacks.     MONITORING/EVALUATION  Progress toward goals will be monitored and evaluated per policy.

## 2025-05-12 NOTE — PROGRESS NOTES
[facility] Progress Note  05/12/2025     Chart reviewed only  Patient discharged prior to being seen by ID on 5/12/2025   Please see previous notes by Dr. Frausto and Dr. Sanchez    ASSESSMENT:  Peritonitis, post washout (first non open, yesterday open) with operative note finding abscess(es)  New abscess now post op, manifested in the classic way with rising white count while on abx.  Newest abscess with pseudomonas  Abscesses found may 8th.  Status post sigmoid colectomy.  No new cultures collected.    RECOMMENDATION:  Likely to need minimum 2 wks augmentin and fluc and Levaquin (growth of Pseudomonas) at Discharge date   Follow up with Dr. Frausto or Dr. Sanchez on discharge.   Please see previous notes by Dr. Sanchez  Chart reviewed-patient not seen as discharged prior to being seen by ID.    Lennie Faust MD  [facility]  Answering Service: [phone]  On-Call ID provider: [phone], option: 9      SUBJECTIVE:   Chart reviewed  Patient has been discharged    Previous note  New to me today.  Cultures reviewed.  On IV Zosyn.    REVIEW OF SYSTEMS:  Negative unless as listed above.  Social history, Family history, Medications: reviewed.    OBJECTIVE:  /58 (BP Location: Left arm)   Pulse 75   Temp 98.2  F (36.8  C) (Oral)   Resp 18   Wt 78.3 kg (172 lb 9.6 oz)   SpO2 97%   BMI 29.17 kg/m              Prior note  PHYSICAL EXAM:  Alert, awake  Vitals tabulated above, reviewed  Neck supple without lymphadenopathy  Sclera normal color, not injected  CARDIOVASCULAR regular rate and rhythm, no murmur  Lungs CLEAR TO AUSCULTATION   Abdomen soft  Midline gut wound  Skin normal  Joints normal  Neurologic exam non focal    Antibiotics:z/fluc    Pertinent labs:    Recent Labs   Lab Test 05/11/25  0645 05/10/25  0608 05/09/25  0627 05/08/25  0557   WBC 10.7  --  13.9* 10.5   HGB 7.1* 7.0* 7.9* 9.3*   HCT 22.6* 21.7* 24.6* 28.7*   MCV 90  --  88 87   *  --  607* 530*  "      Lab Results   Component Value Date    RBC 3.47 05/02/2025     Lab Results   Component Value Date    HGB 10.0 05/02/2025    HGB 10.0 05/02/2025     Lab Results   Component Value Date    HCT 30.7 05/02/2025     No components found for: \"MCT\"  Lab Results   Component Value Date    MCV 89 05/02/2025     Lab Results   Component Value Date    MCH 28.8 05/02/2025     Lab Results   Component Value Date    MCHC 32.6 05/02/2025     Lab Results   Component Value Date    RDW 13.0 05/02/2025     Lab Results   Component Value Date     05/02/2025       Last Comprehensive Metabolic Panel:  Sodium   Date Value Ref Range Status   05/11/2025 137 135 - 145 mmol/L Final     Potassium   Date Value Ref Range Status   05/12/2025 3.6 3.4 - 5.3 mmol/L Final     Chloride   Date Value Ref Range Status   05/11/2025 102 98 - 107 mmol/L Final     Carbon Dioxide (CO2)   Date Value Ref Range Status   05/11/2025 28 22 - 29 mmol/L Final     Anion Gap   Date Value Ref Range Status   05/11/2025 7 7 - 15 mmol/L Final     Glucose   Date Value Ref Range Status   05/11/2025 114 (H) 70 - 99 mg/dL Final     GLUCOSE BY METER POCT   Date Value Ref Range Status   05/08/2025 161 (H) 70 - 99 mg/dL Final     Urea Nitrogen   Date Value Ref Range Status   05/11/2025 6.7 (L) 8.0 - 23.0 mg/dL Final     Creatinine   Date Value Ref Range Status   05/11/2025 0.73 0.51 - 0.95 mg/dL Final     GFR Estimate   Date Value Ref Range Status   05/11/2025 87 >60 mL/min/1.73m2 Final     Comment:     eGFR calculated using 2021 CKD-EPI equation.     Calcium   Date Value Ref Range Status   05/11/2025 8.2 (L) 8.8 - 10.4 mg/dL Final       Liver Function Studies -   Recent Labs   Lab Test 05/01/25  0713   PROTTOTAL 6.6   ALBUMIN 3.5   BILITOTAL 0.4   ALKPHOS 350*   AST 23   ALT 65*       No results found for: \"SED\"    No results found for: \"CRP\"            MICROBIOLOGY DATA:  PA and candida lusitanae      IMAGING/RADIOLOGY:                                                     "                   1.  New left pelvic abscess measuring 9.9 x 4.9 cm.  2.  Decreased in size posterior pelvic abscess (2.1 x 6.9 cm, previously 4 x 7.8 cm).  3.  Slight improved sigmoid colonic wall thickening with persistent small to moderate volume pneumoperitoneum.

## 2025-05-12 NOTE — PROGRESS NOTES
General Surgery Progress Note:    Hospital Day # 15    ASSESSMENT:  1. Gluteal tendinitis of right buttock    2. Diverticulitis of large intestine with perforation without abscess, unspecified bleeding status      Michelle Isaac is a 70 year old female s/p laparoscopic lavage and drain placement 5/1 for perforated sigmoid diverticulitis, interval CT scan revealing pelvic fluid collection s/p IR drain placement with dark brown output and is s/p open sigmoidectomy on 05/08/2025.      She is POD4 from sigmoidectomy, tolerating LFD. Pain well controlled with oral analgesics. Having multiple non bloody BM. Drain down trending with serosanguinous OP, holding suction. Labs with resolving leukocytosis yesterday but persistent stable anemia. AM CBC pending. She is appearing appropriate for discharge from a surgical perspective this afternoon pending medical clearance.      PLAN:  -Continue LFD as tolerated    -Diet reviewed, education to continue to go slowly  -Activity as tolerated deferring to ortho recommendations  -Continue ABX per ID  -Multimodal oral pain control  -Plan for drain removal on day of discharge  -Appearing appropriate for discharge as early as today when deemed medically appropriate  -Postoperative instructions placed in AVS  -She will follow up next week for postoperative check and stable removal  -Surgery to follow     SUBJECTIVE:   She is doing better with pain well-controlled with oral Tylenol and Robaxin no longer requiring IV Toradol.  Has been able to get up out of bed every 2 hours to go to the bathroom with having loose non bloody bowel movements.  Has been tolerating low fiber diet with eggs and omelettes had some abdominal distention with dairy protein supplement that she attributes secondary to being lactose intolerant.  Voiding without difficulties.  Denies fever, chills, nausea, vomiting, dysuria, hematuria.  She was would like to be discharged later today if possible.  Multiple questions  concerning low fiber diet discussed with verbalized understanding.      Patient Vitals for the past 24 hrs:   BP Temp Temp src Pulse Resp SpO2   05/12/25 0732 116/58 98.2  F (36.8  C) Oral 75 18 97 %   05/12/25 0300 133/61 98.1  F (36.7  C) Oral 80 16 95 %   05/11/25 2320 122/58 98  F (36.7  C) Oral 74 20 95 %   05/11/25 1509 124/59 97.7  F (36.5  C) Axillary 78 18 98 %         PHYSICAL EXAM:  General: patient seen resting in bed, no acute distress  Resp: no respiratory distress, breathing comfortably on room air.   Abdomen: soft, distended with minimal mid sagital and LLQ TTP without rebound or guarding. Incisions CDI with staples. LLQ abdominal drain with small volume serosanguinous fluid, bulb holding suction.   Output by Drain (mL) 05/10/25 0700 - 05/10/25 1459 05/10/25 1500 - 05/10/25 2259 05/10/25 2300 - 05/11/25 0659 05/11/25 0700 - 05/11/25 1459 05/11/25 1500 - 05/11/25 2259 05/11/25 2300 - 05/12/25 0659 05/12/25 0700 - 05/12/25 1114   Drain Closed/Suction 1 Medial LLQ Bulb 19 Albanian 65 30 40 35 10 35 20      Extremities: warm and well perfused    05/11 0700 - 05/12 0659  In: 1560 [P.O.:1560]  Out: 1380 [Urine:850; Drains:80]    No results displayed because visit has over 200 results.          TAMMY Sandhu [facility]  Surgery [address]  [address]  [place] [address]?  Office: [phone]

## 2025-05-12 NOTE — PROGRESS NOTES
Patient discharging to home, transportation provided by . Reviewed discharge instructions with patient including incision care, medications, activity, follow up care, when to call provider and low fiber diet. Discussed importance of taking abx as prescribed. Pt noted that her daughter is an RN and will be setting up a schedule with all her new medications. Pt eager to go home. Stated she does not me to call anyone else with her discharge instructions. Stable for discharge.     **Pt no longer has BEKA drain in so I crossed that information off in the AVS**     Chayo Pimentel, RN 5/12/2025 6:17 PM

## 2025-05-12 NOTE — PLAN OF CARE
Problem: Adult Inpatient Plan of Care  Goal: Plan of Care Review  Description: The Plan of Care Review/Shift note should be completed every shift.  The Outcome Evaluation is a brief statement about your assessment that the patient is improving, declining, or no change.  This information will be displayed automatically on your shiftnote.  Outcome: Progressing  Flowsheets (Taken 5/12/2025 0206)  Plan of Care Reviewed With: patient  Overall Patient Progress: no change     Problem: Intestinal Obstruction  Goal: Optimal Pain Control and Function  Outcome: Progressing  Intervention: Prevent or Manage Pain  Recent Flowsheet Documentation  Taken 5/11/2025 2141 by Kwadwo Rhodes RN  Pain Management Interventions: medication (see MAR)   Goal Outcome Evaluation:      Plan of Care Reviewed With: patient    Overall Patient Progress: no changeOverall Patient Progress: no change    Pt is AO. Pt reported pain 5-6/10, pain managed well with scheduled Tylenol. Lab sites clean, dry and intact. BEKA has minimal serosanguinous output. No other concern at this time. Pt call appropriately.    Hannah Rhodes RN.

## 2025-05-12 NOTE — DISCHARGE SUMMARY
"[facility]  Hospitalist Discharge Summary      Date of Admission:  4/27/2025  Date of Discharge:  5/12/2025  Discharging Provider: Chava Henao DO  Discharge Service: Hospitalist Service    Discharge Diagnoses   Sigmoid diverticulitis with perforation and abscess  Sepsis  Acute blood loss anemia  Recent RLE tendon repair and trochanteric bursectomy    Clinically Significant Risk Factors     # Overweight: Estimated body mass index is 29.17 kg/m  as calculated from the following:    Height as of 4/22/25: 1.638 m (5' 4.5\").    Weight as of this encounter: 78.3 kg (172 lb 9.6 oz).       Follow-ups Needed After Discharge   Follow-up Appointments       Follow Up      Follow-up with general surgery in clinic as scheduled        Hospital Follow-up with Existing Primary Care Provider (PCP)          Schedule Primary Care visit within: 7 Days   Recommended labs and Imaging (to be ordered by Primary Care Provider): bmp, cbc               Unresulted Labs Ordered in the Past 30 Days of this Admission       Date and Time Order Name Status Description    5/4/2025 12:55 PM Fungal or Yeast Culture Routine Preliminary         These results [place] [facility] [place]    Discharge Disposition   Discharged to home  Condition at discharge: Stable    Hospital Course   Patient is a 70-year-old female with recent tendon surgery to RLE who presented with abdominal pain and found to have perforated diverticulitis. General surgery consulted and initially did laparoscopic washout with IR drain placement. Patient's clinical status was not improving and was taken back to OR for open sigmoid colectomy and drainage of intra-abdominal abscess.  Now with improvement and tolerating low fiber diet.  ID consulted and patient has been receiving IV zosyn and diflucan. Abx switched to augmentin, levaquin and diflucan for 2 more weeks.     #Sigmoid colon diverticulitis with perforation and abscess  #Sepsis  Laparoscopic " washout with drain placement 5/1  Persistent leukocytosis prompted repeat CT showing new abscess  IR placed second drain 5/4  s/p open sigmoid colectomy, drainage of intra-abdominal abscess 5/8  Tolerating low fiber diet    #Acute blood loss anemia  Monitor and transfuse for Hgb < 7    #Recent RLE tendon repair and trochanteric bursectomy, 4/22  Ortho consulted, not concerned for abscess  Activity per ortho: Brace at all times, flatfoot weightbearing RLE, crutches when ambulating, no active hip abduction or internal rotation, no passive hip external rotation, abduction  PT, OT      Consultations This Hospital Stay   PHARMACY TO DOSE VANCO  SURGERY GENERAL IP CONSULT  ORTHOPEDIC SURGERY IP CONSULT  PHYSICAL THERAPY ADULT IP CONSULT  OCCUPATIONAL THERAPY ADULT IP CONSULT  INFECTIOUS DISEASES IP CONSULT  NUTRITION SERVICES ADULT IP CONSULT  INTERVENTIONAL RADIOLOGY ADULT/PEDS IP CONSULT  WOUND OSTOMY CONTINENCE NURSE  IP CONSULT  PHYSICAL THERAPY ADULT IP CONSULT  OCCUPATIONAL THERAPY ADULT IP CONSULT    Code Status   Full Code    Time Spent on this Encounter   IChava DO, personally saw the patient today and spent greater than 30 minutes discharging this patient.       Chava Henao DO  [place] [id number]  Phone: [phone]  Fax: [phone]  ______________________________________________________________________    Physical Exam   Vital Signs: Temp: 98.4  F (36.9  C) Temp src: Oral BP: 124/63 Pulse: 86   Resp: 18 SpO2: 97 % O2 Device: None (Room air)    Weight: 172 lbs 9.6 oz       Primary Care Physician   [place]    Discharge Orders      Physical Therapy  Referral      Activity    You should avoid lifting anything more than 20 pounds or strenuous physical activity for a total of 2 weeks. Walking does not count as strenuous physical activity.  You are safe to walk up and down stairs.     Tubes and Drains    Current  Tubes and Drains:     Brace/Orthotic/Orthosis 04/22/25 10 days    Drain Closed/Suction 1 Left Abdomen Bulb 19 Romanian <1 day     Discharge Instructions: Caring for Your Drainage Tube    Your surgeon is discharging you with a drainage tube. They commonly leave this drain within the abdomen and other cavities after surgery. It helps drain and collect blood and body fluid after surgery. This can prevent swelling and reduces the risk for infection. Your healthcare provider will remove the drain when he or she determines you no longer need it.    Caring for your drain at home:   - Don't sleep on the same side as the tube.   - Secure the tube and bag inside your clothing with a safety pin. This helps keep the tube from being pulled out.   - Empty your drain at least once a day. Empty it more often if the drain is full. Wash and dry your hands before and after emptying the drain.   - Lift the opening on the drain and empty the fluid into a measuring cup.   - Record the amount of fluid each time you empty the drain. Include the date and time it was emptied as well as the color of the fluid. Share this information with your healthcare provider on your next visit.   - Squeeze the bulb with your hands until you have expressed all the air out of the tube and then close the opening. The bulb should hold suction until it refills with fluid. If it does not hold suction, call your surgeon's office.    Changing the dressing at the tube site (this should be done daily or more often if the dressing is soiled):   - Wash your hands.   - Remove the old bandage.   - Wash your hands again.   - Clean the skin around the tube site with soap and water gently (do not scrub) and pat the skin dry   - Put a new bandage around tube site and tape the bandage in place.   - it is ok to shower with your drain; remove bandage first and then place a new bandage after the shower    Follow-up:    - Make a follow-up appointment as directed   - If one has  already been made for you, this will likely be on you discharge paperwork.    When to call your surgeon:  Call the surgery clinic right away if you have any of the following:   - New or increased pain around the tube   - Redness, swelling, or warmth around the incision or tube   - Drainage that is foul-smelling   - Vomiting   - Fever of 100.5 F ( 38 C) or higher, or as directed by your provider   - Fluid leaking around the tube   - Stitches become loose   - Tube falls out or breaks   - Drainage that changes to dark red, green, brown, or becomes thicker in appearance     Wound care and dressings    Skin glue was used as a dressing. This will peel off on its own in 2-3 weeks.     It is normal to have a small rim of red present around the incisions. This should not, however, extend beyond 1/4 inch from the incision.  If your incisions become increasingly tender, red, or draining, please contact us.    You may shower after 48 hours from surgery.  It is ok to get your incisions wet, but avoid rubbing them.  Avoid soaking in bath tubs, or swimming in lakes, pools, or streams for 2 weeks following surgery.     Follow Up    Follow-up with general surgery in clinic as scheduled     Discharge Instructions     Brief Discharge Instructions    Drain Placement Discharge Instructions:  You had a small tube or drain(s) placed. This was placed so the abnormal fluid collection within your body can be drained out (externally). Please follow the below instructions as you recover:    Care Instructions after drain placement:  - Rest after your drain was placed. Avoid strenuous activity and heavy lifting for the next 2 days. Return to your normal activities as you tolerate after the 2 day restriction.  - You may shower; however, you should not submerge site under water like in a tub bath, Jacuzzi or pool.  - Keep dressing clean and dry as long as drain is in place. If you have a gauze dressing, please change the dressing as needed to keep  "site clean and dry.  - You may eat and drink as normal.  - You may have discomfort after the procedure near the drain exit site. You may take acetaminophen (Tylenol ) or ibuprofen (Motrin ) as needed for any discomfort.  - Inspect the tube often for kinks.  - If you have a gravity bag, keep the bag below the drain exit site to allow for free flow of drainage by gravity.  - Flush your drainage tube with 10mL sterile normal saline.  - Record your daily drain outputs and amount flushed on your drainage record. Bring your records to your next radiology appointment.  - Empty your drainage bag/bulb daily or when it is approximately half way fluid. Follow below instructions for emptying your bag/bulb:       - Clean hands well with soap and water.       - Place a measuring container near the outlet valve of the drainage bag/bulb.       - If you have a drainage BAG: Twist the blue valve at the bottom of the bag while holding the valve over your measuring cup and open container to empty. Re-twist the valve closed on the drainage bag once complete.       - If you have a drainage BULB: Open the bulb cap (at the top of the bulb). Empty the fluid into the measuring cup. Squeeze bulb and hold flat. While bulb is squeezed, close the cap.       - After draining fluid record your drainage output.         - Discard drainage into toilet once fluid drained.    Flushing Your Drainage Tube:   If you have a 3 way stop cock:   [image]  1. Collect flushing supplies: 10mL of sterile normal saline in syringe, alcohol pad.  2. Clean the flushing (center) port with alcohol and attach the flushing syringe by twisting into place (clockwise).  3. Turn the 3 way stopcock valve \"off\" to the drainage bag/bulb. (Valve should be pointed toward the bag/bulb)  4. Gently inject/flush the drain so fluid is moving towards your body through the drainage catheter.   5. Turn the stopcock valve back to its center position to allow for drainage to resume.   6. " Remove flushing syringe from flushing port by untwisting the syringe (counter clockwise).  7. Squeeze bulb or accordion to reapply suction if you have one.  8. Record the output from your drain and flush amount on your drainage record.     If you have a Y flushing port / UreSil Flush Adapter:  1. Collect flushing supplies: 10mL of sterile normal saline in syringe, alcohol pad.  2. Clamp off the tubing to the drain by pinching the white clamp closed. Clean the drain port with an alcohol wipe.   3. Attach the saline syringe to the drain port.   4. Twist the syringe (clockwise) to tighten it to the port   5. Flush sterile normal saline from the syringe into the drain. The saline should flow toward your body not toward the drainage bag.   6. Untwist the syringe (counter clockwise) and remove it.   7. Unclamp the white clamp making sure the drainage is able to flow freely into the bag.   8. Squeeze bulb or accordion to reapply suction if you have one.  9. Record the output from your drain and flush amount on your drainage record.    Follow-Up:   - Please contact [facility] Outpatient Scheduling at [phone].    Please seek medical evaluation for:  - Nausea and/or vomiting   - Diffuse abdominal pain  - Fevers (greater than 101 F)  - Drainage tube falls out, is pulled back or felt to be out of position.     Call [facility] RN Line at [phone] with tube related questions or concerns:  - Unable to flush drainage tube.   - Leakage (or purulent drainage) around drainage tube site.   - Extreme pain at drainage tube site.   - Outputs suddenly stop or significantly reduces.   - Warmth, redness, swelling or tenderness around the drainage tube     Reason for your hospital stay    perforated diverticulitis     Activity    Your activity upon discharge: activity as tolerated     Diet    You should follow a low fiber diet when you go home.  Patients can have difficulty with constipation following surgery, due in part to the  administration of narcotic medications.  If you are suffering with constipation, you should avoid foods such as hard cheeses or red meat. Foods high in fiber are recommended.     Diet    Follow this diet upon discharge: Current Diet:Orders Placed This Encounter      Diet      Low Fiber Diet     Hospital Follow-up with Existing Primary Care Provider (PCP)            Significant Results and Procedures   Most Recent 3 CBC's:  Recent Labs   Lab Test 05/11/25  0645 05/10/25  0608 05/09/25  0627 05/08/25  0557   WBC 10.7  --  13.9* 10.5   HGB 7.1* 7.0* 7.9* 9.3*   MCV 90  --  88 87   *  --  607* 530*     Most Recent 3 BMP's:  Recent Labs   Lab Test 05/12/25  1038 05/11/25  0645 05/09/25  0627 05/08/25  1320 05/07/25  0541   NA  --  137 135  --  136   POTASSIUM 3.6 3.1* 4.3  --  3.7   CHLORIDE  --  102 99  --  100   CO2  --  28 26  --  29   BUN  --  6.7* 7.7*  --  5.8*   CR  --  0.73 0.79  --  0.75   ANIONGAP  --  7 10  --  7   ZACHARIAH  --  8.2* 8.7*  --  8.6*   GLC  --  114* 118* 161* 115*     Most Recent 2 LFT's:  Recent Labs   Lab Test 05/01/25  0713 04/28/25  0614   AST 23 74*   ALT 65* 147*   ALKPHOS 350* 181*   BILITOTAL 0.4 0.9     7-Day Micro Results       No results found for the last 168 hours.        ,   Results for orders placed or performed during the hospital encounter of 04/27/25   CT External Imaging Abdomen    Narrative    Images were obtained from an external facility.  Click PACS Images   hyperlink to view images.  Textual results have been scanned into the   media tab.   CT Abdomen Pelvis w Contrast    Narrative    EXAM: CT ABDOMEN PELVIS W CONTRAST  LOCATION: [facility]  DATE: 4/30/2025    INDICATION: reevaluate colonic perforation.  COMPARISON: 4/27/2025  TECHNIQUE: CT scan of the abdomen and pelvis was performed following injection of IV contrast. Multiplanar reformats were obtained. Dose reduction techniques were used.  CONTRAST: 82ml isovue 370    FINDINGS:   LOWER  CHEST: No basilar infiltrates. Small amount of pneumoperitoneum extends into a small hiatal hernia.    HEPATOBILIARY: No biliary dilatation.    PANCREAS: Unremarkable    SPLEEN: Unremarkable    ADRENAL GLANDS: Unremarkable    KIDNEYS/BLADDER: Bilateral heterogeneous renal enhancement without perinephric stranding. Decompressed bladder.    BOWEL: Moderate volume of pneumoperitoneum redemonstrated. Long segment wall thickening in the rectosigmoid, increased. Numerous associated diverticula. Scattered colonic air-fluid levels. No small bowel obstruction. New multilobulated fluid collections   in the posterior pelvis and left pelvic sidewall. Dominant pelvic collection measures approximately 7.8 x 4.0 cm, image 172 series 3. Smaller collections along the left pelvic sidewall measure between 2.9 and 1.8 cm, image 178. 3.5 cm collection along   the right pelvic sidewall also noted.     LYMPH NODES: No significant retroperitoneal adenopathy.    VASCULATURE: No abdominal aortic aneurysm. Patent portal vein.    PELVIC ORGANS: Small uterine fibroid.    MUSCULOSKELETAL: No acute bony abnormalities. Multilevel spondylosis and moderate facet arthropathies. Small fat-containing umbilical hernia. Gas and fluid collection superficial to the proximal left femur measures up to 5.0 cm, image 204. Adjacent fluid   and gas tracks along the upper right thigh musculature. Overall appearances are similar to prior exam given differences in technique.      Impression     IMPRESSION:   1.  New gas and fluid collections in the pelvis compatible with abscess formation.  2.  Persistent pneumoperitoneum.  3.  Increasing rectosigmoid wall thickening.  4.  Persistent complex gas and fluid collection overlying the right hip.   CT Abdomen Pelvis w Contrast    Narrative    EXAM: CT ABDOMEN AND PELVIS WITH CONTRAST  LOCATION: [facility]  DATE: 05/04/2025    INDICATION: Significant WBC increase POD3 with known abdominal  infection; assess for new fluid collections.  COMPARISON: CT abdomen and pelvis 04/30/2025.  TECHNIQUE: CT scan of the abdomen and pelvis was performed following injection of IV contrast. Multiplanar reformats were obtained. Dose reduction techniques were used.  CONTRAST: Isovue 370, 82 mL.    FINDINGS:   LOWER CHEST: Small hiatal hernia.    HEPATOBILIARY: Normal.    PANCREAS: Normal.    SPLEEN: Normal.    ADRENAL GLANDS: Normal.    KIDNEYS/BLADDER: Normal.    BOWEL: Slight improved thickening of the sigmoid colon. Liquid stool throughout the colon. No bowel obstruction. Mural fat deposition within a few loops of terminal ileum likely reflect chronic inflammation.    LYMPH NODES/PERITONEUM: New left pelvic gas and fluid collection measuring 9.9 x 4.9 cm (3/156).     Decreased in size posterior pelvic fluid collection measuring 2.1 x 6.9 cm, previously 4 x 7.8 cm (3/178). Surgical drain terminates in the pelvis just anterior to the aforementioned pelvic collection.    Small to moderate volume pneumoperitoneum.    VASCULATURE: Normal.    PELVIC ORGANS: Unremarkable.    MUSCULOSKELETAL: Trace abdominal wall subcutaneous gas. Resolved gas within a right lateral thigh fluid collection measuring approximately 4.1 x 2.6 cm.       Impression    IMPRESSION:   1.  New left pelvic abscess measuring 9.9 x 4.9 cm.  2.  Decreased in size posterior pelvic abscess (2.1 x 6.9 cm, previously 4 x 7.8 cm).  3.  Slight improved sigmoid colonic wall thickening with persistent small to moderate volume pneumoperitoneum.     CT Abdomen Peritoneum Abscess Drain w Cath Place    Narrative    EXAM:  1. PERCUTANEOUS DRAIN PLACEMENT PELVIC ABSCESS  2. CT GUIDANCE  3. CONSCIOUS SEDATION  LOCATION: [facility]  DATE: 5/4/2025    INDICATION: post op pelvic abscess  TECHNIQUE: Dose reduction techniques were used.    PROCEDURE: Informed consent obtained. Site marked. Prior images reviewed. Required items made available. Patient  identity confirmed verbally and with arm band. Patient reevaluated immediately before administering sedation. Universal protocol was   followed. Time out performed. The left lower quadrant access site was prepped and draped in sterile fashion. 10 mL of 1% lidocaine was infused into the local soft tissues. Using standard technique and under direct CT guidance, a 12 French drainage   catheter was inserted into the fluid collection.      SPECIMEN: 20 mL of turbid yellow fluid was aspirated and sent to lab for cultures and Gram stain.    BLOOD LOSS: Minimal.    The patient tolerated the procedure well. No immediate complications.    SEDATION: Versed 2 mg. Fentanyl 100 mcg. The procedure was performed with administration intravenous conscious sedation with appropriate preoperative, intraoperative, and postoperative evaluation.    15 minutes of supervised face to face conscious sedation time was provided by a radiology nurse under my direct supervision.      Impression    IMPRESSION:  1.  Successful CT-guided drain placement into left pelvic fluid collection/abscess.    Reference CPT Codes: 26847, 68818       Discharge Medications   Current Discharge Medication List        START taking these medications    Details   amoxicillin-clavulanate (AUGMENTIN) 875-125 MG tablet Take 1 tablet by mouth every 12 hours for 14 days.  Qty: 28 tablet, Refills: 0    Associated Diagnoses: Diverticulitis of large intestine with perforation without abscess, unspecified bleeding status      fluconazole (DIFLUCAN) 200 MG tablet Take 1 tablet (200 mg) by mouth daily for 14 days.  Qty: 14 tablet, Refills: 0    Associated Diagnoses: Diverticulitis of large intestine with perforation without abscess, unspecified bleeding status      ibuprofen (ADVIL/MOTRIN) 400 MG tablet Take 1 tablet (400 mg) by mouth every 6 hours as needed for inflammatory pain.    Associated Diagnoses: Diverticulitis of large intestine with perforation without abscess,  unspecified bleeding status; Gluteal tendinitis of right buttock      levofloxacin (LEVAQUIN) 500 MG tablet Take 1 tablet (500 mg) by mouth every 24 hours for 14 days.  Qty: 14 tablet, Refills: 0    Associated Diagnoses: Diverticulitis of large intestine with perforation without abscess, unspecified bleeding status      methocarbamol (ROBAXIN) 500 MG tablet Take 1 tablet (500 mg) by mouth 3 times daily.  Qty: 90 tablet, Refills: 0    Associated Diagnoses: Gluteal tendinitis of right buttock      polyethylene glycol (MIRALAX) 17 GM/Dose powder Take 17 g by mouth daily.    Associated Diagnoses: Diverticulitis of large intestine with perforation without abscess, unspecified bleeding status      simethicone (MYLICON) 80 MG chewable tablet Take 1 tablet (80 mg) by mouth every 6 hours as needed for cramping or flatulence.    Associated Diagnoses: Diverticulitis of large intestine with perforation without abscess, unspecified bleeding status           CONTINUE these medications which have NOT CHANGED    Details   acetaminophen (ARTHRITIS PAIN APAP) 650 MG CR tablet Take 1,300 mg by mouth 3 times daily as needed for mild pain or fever.      atorvastatin (LIPITOR) 20 MG tablet Take 1 tablet by mouth at bedtime.      calcium carbonate (OS-ZACHARIAH) 500 MG tablet Take 1 tablet by mouth daily.      fish oil-omega-3 fatty acids 1000 MG capsule Take 2 g by mouth daily.      Ginkgo Biloba (GINKOBA) 40 MG TABS Take 40 mg by mouth daily.      Glucosamine Sulfate 500 MG TABS Take 500 mg by mouth daily.      levothyroxine (SYNTHROID/LEVOTHROID) 75 MCG tablet Take 75 mcg by mouth daily.      multivitamin w/minerals (THERA-VIT-M) tablet Take 1 tablet by mouth daily.      ondansetron (ZOFRAN ODT) 4 MG ODT tab Take 1 tablet (4 mg) by mouth every 8 hours as needed for nausea.  Qty: 4 tablet, Refills: 0    Associated Diagnoses: Orthopedic aftercare      senna-docusate (SENOKOT-S/PERICOLACE) 8.6-50 MG tablet Take 1-2 tablets by mouth 2 times  "daily.  Qty: 30 tablet, Refills: 0    Associated Diagnoses: Orthopedic aftercare      TURMERIC PO Take 2 tablets by mouth daily.      vitamin B-12 (CYANOCOBALAMIN) 1000 MCG tablet Take 1,000 mcg by mouth daily.      vitamin D3 (CHOLECALCIFEROL) 50 mcg (2000 units) tablet Take 1 tablet by mouth daily.      Vitamin E 90 MG (200 UNIT) capsule Take 200 Units by mouth daily.           STOP taking these medications       aspirin 81 MG EC tablet Comments:   Reason for Stopping:         celecoxib (CELEBREX) 100 MG capsule Comments:   Reason for Stopping:         HYDROcodone-acetaminophen (NORCO) 5-325 MG tablet Comments:   Reason for Stopping:             Allergies   Allergies   Allergen Reactions    Ciprofloxacin Hives and Rash     \"Many years ago\"    Oxycodone Other (See Comments)     Does NOT like-makes her feel weird, dizzy, weak     "

## 2025-05-12 NOTE — PLAN OF CARE
Physical Therapy Discharge Summary    Reason for therapy discharge:    Discharged to home.    Progress towards therapy goal(s). See goals on Care Plan in [facility] electronic health record for goal details.  Goals partially met.  Barriers to achieving goals:   discharge from facility.    Therapy recommendation(s):    Continued therapy is recommended.  Rationale/Recommendations:  outpatient PT.

## 2025-05-12 NOTE — PROGRESS NOTES
"[facility]    Medicine Progress Note - Hospitalist Service    Date of Admission:  4/27/2025    Assessment & Plan   70-year-old female with recent tendon surgery to RLE presented with abdominal pain and found to have perforated diverticulitis.    #Sigmoid colon diverticulitis with perforation and abscess  #Sepsis  Laparoscopic washout with drain placement 5/1  Persistent leukocytosis prompted repeat CT showing new abscess  IR placed second drain 5/4  s/p open sigmoid colectomy, drainage of intra-abdominal abscess 5/8  Pain and nausea control  ID following: fluconazole and zosyn, planning for 2w Augmentin and Diflucan at DC  Tolerating low fiber diet    #Acute blood loss anemia  Monitor and transfuse for Hgb < 7    #Recent RLE tendon repair and trochanteric bursectomy, 4/22  Ortho consulted, not concerned for abscess  Activity per ortho: Brace at all times, flatfoot weightbearing RLE, crutches when ambulating, no active hip abduction or internal rotation, no passive hip external rotation, abduction  PT, OT      DVT PPx: PCDs          Diet: Diet  Low Fiber Diet    Luz Catheter: Not present  Lines: None     Cardiac Monitoring: None  Code Status: Full Code      Clinically Significant Risk Factors        # Hypokalemia: Lowest K = 3.1 mmol/L in last 2 days, will replace as needed        # Hypoalbuminemia: Lowest albumin = 3.3 g/dL at 4/28/2025  6:14 AM, will monitor as appropriate                # Overweight: Estimated body mass index is 29.17 kg/m  as calculated from the following:    Height as of 4/22/25: 1.638 m (5' 4.5\").    Weight as of this encounter: 78.3 kg (172 lb 9.6 oz).             Social Drivers of Health    Housing Stability: High Risk (4/28/2025)    Housing Stability     Do you have housing? : No     Are you worried about losing your housing?: No   Physical Activity: Insufficiently Active (3/16/2024)    Received from [facility]    Exercise Vital Sign     Days of Exercise per Week: " 3 days     Minutes of Exercise per Session: 30 min          Disposition Plan     Medically Ready for Discharge: Anticipated Tomorrow             Chava Henao DO  Hospitalist Service  [facility]  Securely message with [employer] (more info)  Text page via [employer] Paging/Directory   ______________________________________________________________________    Interval History   No acute events overnight    Physical Exam   Vital Signs: Temp: 98.2  F (36.8  C) Temp src: Oral BP: 116/58 Pulse: 75   Resp: 18 SpO2: 97 % O2 Device: None (Room air)    Weight: 172 lbs 9.6 oz    General Appearance:  No acute distress  Respiratory: Nonlabored breathing  Extremities: No peripheral edema or cyanosis  Neuro: Alert and oriented x 3, normal speech      Medical Decision Making       50 MINUTES SPENT BY ME on the date of service doing chart review, history, exam, documentation & further activities per the note.      Data

## 2025-05-12 NOTE — PLAN OF CARE
Problem: Adult Inpatient Plan of Care  Goal: Plan of Care Review  Description: The Plan of Care Review/Shift note should be completed every shift.  The Outcome Evaluation is a brief statement about your assessment that the patient is improving, declining, or no change.  This information will be displayed automatically on your shiftnote.  Outcome: Adequate for Care Transition   Goal Outcome Evaluation:         Pt is anticipating discharge to home later today. BEKA drain was removed, incision dry and intact with bianca present. Pt has questions about antibiotics for home use, message sent to Dr Henao to see pt.

## 2025-05-13 NOTE — OP NOTE
General Surgery Operative Note    Date of Surgery: 05/13/25      Surgeon: Zohaib Velez MD    Assistant: Cher Odonnell MD. her assistance was required for retraction during the case as well as EEA stapling.    Preoperative Diagnosis: Perforated diverticulitis with abscess    Postoperative Diagnosis: Perforated diverticulitis with abscess    Procedure: Open sigmoid colectomy and drainage of intra-abdominal abscess    EBL: 75 cc    Findings: Large abscess cavity along the left side of the sigmoid colon with approximately 25 cm of inflamed colon.    Indications:  Patient is a 70-year-old woman who was admitted to the hospital over a week ago with perforated diverticulitis.  She initially improved but then developed a pelvic abscess which was treated with a laparoscopic washout.  She then developed a second abscess that was drained by IR.  Clinically she continued to struggle and so decision made to proceed to the OR for open sigmoid colectomy.  Details of this operation including the risk of bleeding, infection, and anastomotic complications were discussed in detail.  The patient consented to proceed.    Details of operation:  Patient was brought to the operating room and laid on the table in the supine position.  General anesthesia was induced without incident.  Luz catheter was placed.  She was placed in lithotomy position carefully to avoid abduction of her right leg due to her recent surgery.  Her 2 previous drains were removed.  Her abdomen is then prepped and draped in usual sterile fashion.  A timeout for safety was performed.    Began with a lower midline incision that was carried down through the fascia and into the abdominal cavity.  Patient was placed in Trendelenburg position with the left side up.  Small bowel was reduced out of the pelvis.  The sigmoid colon was significantly inflamed.  We began taking down the lateral attachments to the pelvic sidewall and fairly quickly entered a large abscess  cavity that was partially drained.  We irrigated and cleaned the rest of this cavity out.  Once we entered this cavity I used mainly blunt dissection with my hand to pull the sigmoid colon away from these lateral inflammatory adhesions.  We can identify healthy colon proximal and distally to the inflamed portion of colon.  We selected a site proximal to the inflammation.  A mesenteric window was created and a 75 blue load FRANKI stapler was fired across the colon.  We then used a LigaSure device to divide the mesentery fairly close to the bowel and we were far away from the ureter or other concerning structures.  We continued this dissection until we were distal to the inflammation.  We then used a contour stapler to divide the rectosigmoid junction.  The specimen was then handed off.  The patient had some oozing from the old abscess cavity that was managed with packing and ultimately was hemostatic.    We then proceeded to evaluate for an anastomosis.  We remobilized the left colon by taking down the white line of Toldt and some of the attachments up at the splenic flexure.  This gives adequate length down into the pelvis.  The rectum itself was mobilized a bit more to be straightened out allowing for anastomosis.  We then sized up the rectum and were able to get a 29 sizer.  A 29 EEA stapler was then selected and the anvil was brought to the field.  I opened the staple line at the proximal colon and used a 2-0 PDS suture to secure the anvil.  The EEA stapler was then brought up through the rectum and brought to the end of the stump.  The spike was then brought out through the anterior portion of the rectum away from the staple line.  This was mated with the anvil and the EEA anastomosis was fashioned.  The donuts were completely intact.  The patient had excellent blood flow to both limbs.  There was a small amount of tension in with a bit more mobilization this was completely mitigated and ultimately there is no  tension on the anastomosis.  A leak test was performed that was negative.  A 19 Turkish Brian drain was then brought in through the left abdomen and laid into the pelvis.  The abdomen is irrigated copiously.    We then changed our gloves and gowns and used a closing tray to finish the case.  The fascia was closed with an 0 PDS suture.  Skin was closed with staples after irrigating the subcutaneous tissue.  The drain was secured in place.  Sterile dressings were applied.  The patient tolerated the procedure well.  There were no immediately apparent complications.    Zohaib Velez MD  General Surgeon  [facility]  Surgery [address] [address]?  Office: [phone]

## 2025-05-14 ENCOUNTER — PATIENT OUTREACH (OUTPATIENT)
Dept: CARE COORDINATION | Facility: CLINIC | Age: 70
End: 2025-05-14

## 2025-05-14 ENCOUNTER — TELEPHONE (OUTPATIENT)
Dept: SURGERY | Facility: CLINIC | Age: 70
End: 2025-05-14

## 2025-05-14 NOTE — TELEPHONE ENCOUNTER
Patient is s/p laparoscopic lavage and drain placement 5/1 for perforated sigmoid diverticulitis, interval CT scan revealing pelvic fluid collection s/p IR drain placement with dark brown output and is s/p open sigmoidectomy on 05/08/2025. Calls today with questions about sitting. States that she has been reclining and lying down but has avoided sitting. Let her know that she is ok to sit on a chair and that we encourage movement after surgery. She also stated that she recently had hip surgery so she was been walking short distances with a walker around her home and to the restroom. Patient was asking if she was allowed to sit and put pressure to that area. Let her know that she is fine to sit as long as she is able and that it is dependent on her being comfortable. Assured her that the pressure from sitting down will not cause harm internally. Patient had no other questions and will call with any additional questions or concerns.    Maria E Kovacs RN  [facility]  General Surgery  [address] [place] [address]  Office:[phone]  Employed by [facility]

## 2025-05-14 NOTE — TELEPHONE ENCOUNTER
"Patient is s/p laparoscopic lavage and drain placement 5/1 for perforated sigmoid diverticulitis, interval CT scan revealing pelvic fluid collection s/p IR drain placement with dark brown output and is s/p open sigmoidectomy on 05/08/2025. Calls today wanting to know why she is supposed to take her prescribed robaxin, as it makes her very sleepy and feel weak. I explained that muscle relaxers are typically prescribed   \"As needed\", so she does not need to take it if she doesn't care for the way It makes her feel. I let her know I would reach out to our [facility] to see if there was any other indication for needing to take it TID, as I didn't see any reasoning for that. Pt verbalized understanding and was agreeable with plan.     Sammi Dee RN  [facility]  General Surgery  [address] [place] [address]  [address] [place] [address]  Office:[phone]  Employed by [facility]       "

## 2025-05-14 NOTE — PROGRESS NOTES
Connected Care Resource Center Contact  [facility]/Voicemail     Clinical Data: Post-Discharge Outreach     Outreach attempted x 2.  Left message on patient's voicemail, providing [facility]'s central phone number of [phone] ([phone]) for questions/concerns and/or to schedule an appt with an [facility] provider, if they do not have a PCP.      Plan:  [facility] will do no further outreaches at this time.       Cher Cannon MA  Connected Care Resource Center, [facility]    *Connected Care Resource Team does NOT follow patient ongoing. Referrals are identified based on internal discharge reports and the outreach is to ensure patient has an understanding of their discharge instructions.

## 2025-05-19 ENCOUNTER — OFFICE VISIT (OUTPATIENT)
Dept: SURGERY | Facility: CLINIC | Age: 70
End: 2025-05-19
Payer: MEDICARE

## 2025-05-19 DIAGNOSIS — Z48.89 POSTOPERATIVE VISIT: Primary | ICD-10-CM

## 2025-05-19 PROCEDURE — 99024 POSTOP FOLLOW-UP VISIT: CPT

## 2025-05-19 RX ORDER — CELECOXIB 100 MG/1
100 CAPSULE ORAL 2 TIMES DAILY
COMMUNITY

## 2025-05-19 RX ORDER — CELECOXIB 200 MG/1
200 CAPSULE ORAL DAILY
COMMUNITY

## 2025-05-19 NOTE — PROGRESS NOTES
HPI: Patient is here for follow-up s/p laparoscopic washout and drain placement 5/1/25 for perforated sigmoid diverticulitis, followed by IR drain placement into pelvic fluid collection and ultimately ended up undergoing open sigmoidectomy on 5/8/25. She is doing well since discharge from the hospital. Pain is well controlled, she has been taking Tylenol and Celebrex for her pain. No difficulties with the surgical wound. Her activity is still limited secondary to her hip surgery, but her restrictions will be lifted in two weeks. Her main complaint is abdominal bloating. She is eating and drinking well. Denies fever, chills, nausea, vomiting. Endorses passing flatus and BM's every day.     There were no vitals taken for this visit.    Physical Exam:  General: Appears well  Abdomen: Soft, non-tender, minimally distended. No rebound or guarding.   Surgical wounds: Incisions healing well, clean/dry/intact with no induration or drainage. Staples removed without issue and few steri strips placed.    Pathology:   A.  Colon, sigmoid, sigmoid colectomy:  -- Benign colonic mucosa and submucosa with diverticulosis and associated acute and chronic inflammation, fibrosis, serosal adhesions, and acute serositis.     B.  Colon, sigmoid, sigmoid colectomy:  -- Benign colonic mucosa and submucosa with serosal adhesions and acute serositis.    Assessment/Plan: Doing well after surgery and continues to show signs of healing. Discussed importance of protein intake to promote wound healing. Reviewed activity restrictions from general surgery for when she is able to advance her activity when her orthopedic restrictions are lifted.She will follow-up as scheduled with Dr. Velez on 5/29/25.    Lissa Saavedra PA-C  [facility]  Surgery Clinic [address] [address]?  Office: [phone]

## 2025-05-29 ENCOUNTER — OFFICE VISIT (OUTPATIENT)
Dept: SURGERY | Facility: CLINIC | Age: 70
End: 2025-05-29
Payer: MEDICARE

## 2025-05-29 DIAGNOSIS — Z90.49 S/P PARTIAL COLECTOMY: Primary | ICD-10-CM

## 2025-05-29 PROCEDURE — 99024 POSTOP FOLLOW-UP VISIT: CPT | Performed by: SURGERY

## 2025-05-29 NOTE — LETTER
5/29/2025      Michelle Isaac  [address] [address]      Dear Colleague,    Thank you for referring your patient, Michelle Isaac, to the [facility] AND BARIATRICS CARE [place]. Please see a copy of my visit note below.    General Surgery Post OP    Patient is several weeks out now from open sigmoid colectomy for perforated diverticulitis.  She is doing very well.  She really has no complaints at this time.    On examination her abdomen is soft and nondistended.  Her incision is healing nicely.  Staples have been removed.  No concerns.    Patient is recovering very well after her surgery.  I have no concerns at this time.  I did explain he can take several months for everything to get to a new baseline.  She can proceed with a regular high-fiber diet at this point.  She should avoid heavy lifting until 6 weeks after surgery.  She has no other restrictions at this time.  If any further issues arise she may return but no further routine follow-up needed.    Zohaib Velez MD  General Surgeon  [facility]  Surgery Clinic - [address]  [place] [address]  Office: [phone]      Again, thank you for allowing me to participate in the care of your patient.        Sincerely,        Zohaib Velez MD    Electronically signed

## 2025-06-01 LAB — BACTERIA ABSC ANAEROBE+AEROBE CULT: ABNORMAL

## 2025-07-02 NOTE — PROGRESS NOTES
General Surgery Post OP    Patient is several weeks out now from open sigmoid colectomy for perforated diverticulitis.  She is doing very well.  She really has no complaints at this time.    On examination her abdomen is soft and nondistended.  Her incision is healing nicely.  Staples have been removed.  No concerns.    Patient is recovering very well after her surgery.  I have no concerns at this time.  I did explain he can take several months for everything to get to a new baseline.  She can proceed with a regular high-fiber diet at this point.  She should avoid heavy lifting until 6 weeks after surgery.  She has no other restrictions at this time.  If any further issues arise she may return but no further routine follow-up needed.    Zohaib Velez MD  General Surgeon  [facility]  Surgery Clinic - [address] [address]  Office: [phone]

## (undated) DEVICE — SUTURE SUTURETAPE 36.6X1.3MM 2.5 CRC BLACK WHITE AR-7506T

## (undated) DEVICE — GLOVE BIOGEL INDICATOR 7.5 LF 41675

## (undated) DEVICE — ENDO TROCAR SLEEVE KII Z-THREADED 05X100MM CTS02

## (undated) DEVICE — STPL SKIN 35W 6.9MM  PXW35

## (undated) DEVICE — Device

## (undated) DEVICE — GLOVE BIOGEL PI ULTRATOUCH G SZ 8.0 42180

## (undated) DEVICE — STPL RELOAD ECHELON CONTOUR CVD 40MM BLUE GCR40B

## (undated) DEVICE — GLOVE UNDER INDICATOR PI SZ 6.5 LF 41665

## (undated) DEVICE — SUTURE VICRYL+ 1 27IN CT-1 UND VCP261H

## (undated) DEVICE — SUTURE SILK 2-0 TIES 30IN SA85H

## (undated) DEVICE — DRSG MEPILEX BORDER ADHS 4INX12IN STRL 496605

## (undated) DEVICE — SU DERMABOND ADVANCED .7ML DNX12

## (undated) DEVICE — STPL CIRCULAR 29MM CVD CDH29P

## (undated) DEVICE — ESU PENCIL SMOKE EVAC W/ROCKER SWITCH 0703-047-000

## (undated) DEVICE — CUSTOM PACK TOTAL HIP SOP5BTHHEA

## (undated) DEVICE — GLOVE BIOGEL PI ULTRATOUCH G SZ 7.5 42175

## (undated) DEVICE — CATH TRAY FOLEY SURESTEP 16FR LF A947316

## (undated) DEVICE — STPL ECHELON CONTOUR CUTTER CVD 40MM GREEN GCS40G

## (undated) DEVICE — ELECTRODE PATIENT RETURN ADULT L10 FT 2 PLATE CORD 0855C

## (undated) DEVICE — CUSTOM PACK GEN MAJOR SBA5BGMHEA

## (undated) DEVICE — SOL WATER IRRIG 1000ML BOTTLE 2F7114

## (undated) DEVICE — SU PROLENE 2-0 SHDA 36" 8523H

## (undated) DEVICE — SPONGE LAP 18X18" X8435

## (undated) DEVICE — ESU LIGASURE IMPACT OPEN SEALER/DVDR CVD LG JAW LF4418

## (undated) DEVICE — TUBING SMOKE EVAC PNEUMOCLEAR HIGH FLOW 0620050250

## (undated) DEVICE — NDL INSUFFLATION 13GA 120MM C2201

## (undated) DEVICE — STPL LINEAR CUT 75MM TLC75

## (undated) DEVICE — GLOVE BIOGEL PI INDICATOR 8.0 LF 41680

## (undated) DEVICE — SU SILK 2-0 SH 30" K833H

## (undated) DEVICE — SUCTION MANIFOLD NEPTUNE 2 SYS 4 PORT 0702-020-000

## (undated) DEVICE — DRAPE IOBAN INCISE 23X17" 6650EZ

## (undated) DEVICE — SOL NACL 0.9% IRRIG 1000ML BOTTLE 2F7124

## (undated) DEVICE — GOWN IMPERVIOUS BREATHABLE SMART XLG 89045

## (undated) DEVICE — VIAL DECANTER STERILE WHITE DYNJDEC06

## (undated) DEVICE — PAD HIP POSITIONING MCGUIRE 707-CPM

## (undated) DEVICE — DRSG GAUZE 4X4" 3033

## (undated) DEVICE — SU SILK 2-0 FS-1 18" 685G

## (undated) DEVICE — SU ETHIBOND 2 V-37 4X30" MX69G

## (undated) DEVICE — HOLDER LIMB VELCRO OR 0814-1533

## (undated) DEVICE — SUTURE PDS 0 60IN CTX+ LOOPED PDP990G

## (undated) DEVICE — SU MONOCRYL+ 4-0 18IN PS2 UND MCP496G

## (undated) DEVICE — CUSTOM PACK COLON CLOSING SBA5BCCHEA

## (undated) DEVICE — SUCTION MANIFOLD NEPTUNE 2 SYS 1 PORT 702-025-000

## (undated) DEVICE — DRAPE SHEET REV FOLD 3/4 9349

## (undated) DEVICE — DRAPE LAP/CHOLE W/O POUCH 89225

## (undated) DEVICE — DRSG AQUACEL AG HYDROFIBER 3.5X6" 422604

## (undated) DEVICE — SU ETHILON 2-0 FS 18" 664G

## (undated) DEVICE — GOWN LG DISP 9515

## (undated) DEVICE — KIT TURNOVER FAIRVIEW SOUTHDALE FULL SP3889

## (undated) DEVICE — SOL RINGERS LACTATED 1000ML BAG 2B2324X

## (undated) DEVICE — SU PDS II 0 CT-1 27" Z340H

## (undated) DEVICE — SUTURE MONOCRYL 3-0 18 PS2 UND MCP497G

## (undated) DEVICE — SUTURE VICRYL+ 2-0 27IN CT-1 UND VCP259H

## (undated) DEVICE — SUCTION TIP FLEXI CLEAR TIP DISP K62

## (undated) DEVICE — DRAIN BLAKE 19FR SIL 2231

## (undated) DEVICE — KIT SIGMOIDOSCOPE ENDOSCOPIC LIGHTED 18FR KI613/10

## (undated) DEVICE — SU VICRYL+ 3-0 27IN SH UND VCP416H

## (undated) DEVICE — GLOVE BIOGEL PI SZ 7.5 40875

## (undated) DEVICE — SU FIBERLINK #2 BRAIDED PB BLUE W/1.5" CLSD LOOP  AR-7235

## (undated) DEVICE — CUSTOM PACK LAP CHOLE SBA5BLCHEA

## (undated) DEVICE — ENDO TROCAR FIRST ENTRY KII FIOS Z-THRD 05X100MM CTF03

## (undated) DEVICE — ESU GROUND PAD ADULT REM W/15' CORD E7507DB

## (undated) DEVICE — DRAIN RESERVOIR 100ML JP 0070740

## (undated) DEVICE — BLADE KNIFE SURG 10 371110

## (undated) DEVICE — PREP CHLORAPREP 26ML TINTED HI-LITE ORANGE 930815

## (undated) DEVICE — GLOVE BIOGEL PI ULTRATOUCH G SZ 6.0 42160

## (undated) DEVICE — DRSG DRAIN 4X4" 7086

## (undated) DEVICE — ESU ELEC BLADE 6" COATED E1450-6

## (undated) DEVICE — SU SILK 3-0 SH CR 8X18" C013D

## (undated) RX ORDER — FENTANYL CITRATE-0.9 % NACL/PF 10 MCG/ML
PLASTIC BAG, INJECTION (ML) INTRAVENOUS
Status: DISPENSED
Start: 2025-04-22

## (undated) RX ORDER — KETOROLAC TROMETHAMINE 30 MG/ML
INJECTION, SOLUTION INTRAMUSCULAR; INTRAVENOUS
Status: DISPENSED
Start: 2025-05-08

## (undated) RX ORDER — LIDOCAINE HYDROCHLORIDE 10 MG/ML
INJECTION, SOLUTION EPIDURAL; INFILTRATION; INTRACAUDAL; PERINEURAL
Status: DISPENSED
Start: 2025-05-08

## (undated) RX ORDER — ONDANSETRON 2 MG/ML
INJECTION INTRAMUSCULAR; INTRAVENOUS
Status: DISPENSED
Start: 2025-05-08

## (undated) RX ORDER — EPHEDRINE SULFATE 50 MG/ML
INJECTION, SOLUTION INTRAMUSCULAR; INTRAVENOUS; SUBCUTANEOUS
Status: DISPENSED
Start: 2025-04-22

## (undated) RX ORDER — PROPOFOL 10 MG/ML
INJECTION, EMULSION INTRAVENOUS
Status: DISPENSED
Start: 2024-09-13

## (undated) RX ORDER — FENTANYL CITRATE-0.9 % NACL/PF 10 MCG/ML
PLASTIC BAG, INJECTION (ML) INTRAVENOUS
Status: DISPENSED
Start: 2025-05-08

## (undated) RX ORDER — FENTANYL CITRATE-0.9 % NACL/PF 10 MCG/ML
PLASTIC BAG, INJECTION (ML) INTRAVENOUS
Status: DISPENSED
Start: 2025-05-01

## (undated) RX ORDER — LABETALOL HYDROCHLORIDE 5 MG/ML
INJECTION, SOLUTION INTRAVENOUS
Status: DISPENSED
Start: 2025-05-01

## (undated) RX ORDER — FENTANYL CITRATE 50 UG/ML
INJECTION, SOLUTION INTRAMUSCULAR; INTRAVENOUS
Status: DISPENSED
Start: 2025-05-08

## (undated) RX ORDER — BUPIVACAINE HCL/EPINEPHRINE 0.25-.0005
VIAL (ML) INJECTION
Status: DISPENSED
Start: 2025-05-01

## (undated) RX ORDER — FENTANYL CITRATE 50 UG/ML
INJECTION, SOLUTION INTRAMUSCULAR; INTRAVENOUS
Status: DISPENSED
Start: 2024-09-13

## (undated) RX ORDER — PROPOFOL 10 MG/ML
INJECTION, EMULSION INTRAVENOUS
Status: DISPENSED
Start: 2025-04-22

## (undated) RX ORDER — FENTANYL CITRATE 50 UG/ML
INJECTION, SOLUTION INTRAMUSCULAR; INTRAVENOUS
Status: DISPENSED
Start: 2025-05-04

## (undated) RX ORDER — PROPOFOL 10 MG/ML
INJECTION, EMULSION INTRAVENOUS
Status: DISPENSED
Start: 2025-05-08

## (undated) RX ORDER — LIDOCAINE HYDROCHLORIDE 10 MG/ML
INJECTION, SOLUTION EPIDURAL; INFILTRATION; INTRACAUDAL; PERINEURAL
Status: DISPENSED
Start: 2025-04-22

## (undated) RX ORDER — DEXAMETHASONE SODIUM PHOSPHATE 10 MG/ML
INJECTION, SOLUTION INTRAMUSCULAR; INTRAVENOUS
Status: DISPENSED
Start: 2025-05-01

## (undated) RX ORDER — LIDOCAINE HYDROCHLORIDE 10 MG/ML
INJECTION, SOLUTION EPIDURAL; INFILTRATION; INTRACAUDAL; PERINEURAL
Status: DISPENSED
Start: 2025-05-01

## (undated) RX ORDER — FENTANYL CITRATE 50 UG/ML
INJECTION, SOLUTION INTRAMUSCULAR; INTRAVENOUS
Status: DISPENSED
Start: 2025-04-22

## (undated) RX ORDER — FENTANYL CITRATE-0.9 % NACL/PF 10 MCG/ML
PLASTIC BAG, INJECTION (ML) INTRAVENOUS
Status: DISPENSED
Start: 2024-09-13

## (undated) RX ORDER — FENTANYL CITRATE 50 UG/ML
INJECTION, SOLUTION INTRAMUSCULAR; INTRAVENOUS
Status: DISPENSED
Start: 2025-05-01

## (undated) RX ORDER — ONDANSETRON 2 MG/ML
INJECTION INTRAMUSCULAR; INTRAVENOUS
Status: DISPENSED
Start: 2025-04-22

## (undated) RX ORDER — DEXAMETHASONE SODIUM PHOSPHATE 10 MG/ML
INJECTION, SOLUTION INTRAMUSCULAR; INTRAVENOUS
Status: DISPENSED
Start: 2025-05-08

## (undated) RX ORDER — DEXAMETHASONE SODIUM PHOSPHATE 4 MG/ML
INJECTION, SOLUTION INTRA-ARTICULAR; INTRALESIONAL; INTRAMUSCULAR; INTRAVENOUS; SOFT TISSUE
Status: DISPENSED
Start: 2024-09-13

## (undated) RX ORDER — EPHEDRINE SULFATE 50 MG/ML
INJECTION, SOLUTION INTRAMUSCULAR; INTRAVENOUS; SUBCUTANEOUS
Status: DISPENSED
Start: 2025-05-01

## (undated) RX ORDER — PROPOFOL 10 MG/ML
INJECTION, EMULSION INTRAVENOUS
Status: DISPENSED
Start: 2025-05-01

## (undated) RX ORDER — LIDOCAINE HYDROCHLORIDE 10 MG/ML
INJECTION, SOLUTION INFILTRATION; PERINEURAL
Status: DISPENSED
Start: 2025-05-04